# Patient Record
Sex: MALE | Race: WHITE | NOT HISPANIC OR LATINO | Employment: OTHER | URBAN - METROPOLITAN AREA
[De-identification: names, ages, dates, MRNs, and addresses within clinical notes are randomized per-mention and may not be internally consistent; named-entity substitution may affect disease eponyms.]

---

## 2017-03-23 ENCOUNTER — PATIENT OUTREACH (OUTPATIENT)
Dept: HEALTH INFORMATION MANAGEMENT | Facility: OTHER | Age: 82
End: 2017-03-23

## 2017-03-23 NOTE — PROGRESS NOTES
Outcome: Wrong Number -- Phone number [phone] was removed from patient's chart due to it no longer being in service. Flag placed    Attempt # 1ST

## 2017-03-29 ENCOUNTER — HOSPITAL ENCOUNTER (OUTPATIENT)
Dept: LAB | Facility: MEDICAL CENTER | Age: 82
End: 2017-03-29
Attending: INTERNAL MEDICINE
Payer: MEDICARE

## 2017-03-29 LAB
ALBUMIN SERPL BCP-MCNC: 3.9 G/DL (ref 3.2–4.9)
ALBUMIN/GLOB SERPL: 1.6 G/DL
ALP SERPL-CCNC: 69 U/L (ref 30–99)
ALT SERPL-CCNC: 11 U/L (ref 2–50)
ANION GAP SERPL CALC-SCNC: 7 MMOL/L (ref 0–11.9)
AST SERPL-CCNC: 15 U/L (ref 12–45)
BILIRUB SERPL-MCNC: 0.6 MG/DL (ref 0.1–1.5)
BNP SERPL-MCNC: 18 PG/ML (ref 0–100)
BUN SERPL-MCNC: 26 MG/DL (ref 8–22)
CALCIUM SERPL-MCNC: 9.4 MG/DL (ref 8.5–10.5)
CHLORIDE SERPL-SCNC: 100 MMOL/L (ref 96–112)
CO2 SERPL-SCNC: 33 MMOL/L (ref 20–33)
CREAT SERPL-MCNC: 1.38 MG/DL (ref 0.5–1.4)
GLOBULIN SER CALC-MCNC: 2.4 G/DL (ref 1.9–3.5)
GLUCOSE SERPL-MCNC: 179 MG/DL (ref 65–99)
POTASSIUM SERPL-SCNC: 3.7 MMOL/L (ref 3.6–5.5)
PROT SERPL-MCNC: 6.3 G/DL (ref 6–8.2)
SODIUM SERPL-SCNC: 140 MMOL/L (ref 135–145)
TSH SERPL DL<=0.005 MIU/L-ACNC: 3.4 UIU/ML (ref 0.3–3.7)

## 2017-03-29 PROCEDURE — 36415 COLL VENOUS BLD VENIPUNCTURE: CPT

## 2017-03-29 PROCEDURE — 80053 COMPREHEN METABOLIC PANEL: CPT

## 2017-03-29 PROCEDURE — 84443 ASSAY THYROID STIM HORMONE: CPT

## 2017-03-29 PROCEDURE — 83880 ASSAY OF NATRIURETIC PEPTIDE: CPT | Mod: GA

## 2017-04-04 NOTE — PROGRESS NOTES
Attempt #:2    Verify PCP: yes    Communication Preference Obtained: yes     Review Care Team: yes    Annual Wellness Visit Scheduling  1. Scheduling Status:Not Scheduled. Patient states they are under Doctor's care -- PATIENT WILL HAVE DONE WITH PCP.    MoveinBlue Activation: already active  MoveinBlue Reddy: no  Virtual Visits: no  Opt In to Text Messages: no

## 2017-09-21 ENCOUNTER — HOSPITAL ENCOUNTER (OUTPATIENT)
Dept: LAB | Facility: MEDICAL CENTER | Age: 82
End: 2017-09-21
Attending: INTERNAL MEDICINE
Payer: MEDICARE

## 2017-09-21 LAB
APPEARANCE UR: CLEAR
BACTERIA #/AREA URNS HPF: ABNORMAL /HPF
BILIRUB UR QL STRIP.AUTO: NEGATIVE
COLOR UR: YELLOW
CULTURE IF INDICATED INDCX: YES UA CULTURE
EPI CELLS #/AREA URNS HPF: NEGATIVE /HPF
GLUCOSE UR STRIP.AUTO-MCNC: NEGATIVE MG/DL
HYALINE CASTS #/AREA URNS LPF: ABNORMAL /LPF
KETONES UR STRIP.AUTO-MCNC: NEGATIVE MG/DL
LEUKOCYTE ESTERASE UR QL STRIP.AUTO: ABNORMAL
MICRO URNS: ABNORMAL
NITRITE UR QL STRIP.AUTO: NEGATIVE
PH UR STRIP.AUTO: 7 [PH]
PROT UR QL STRIP: NEGATIVE MG/DL
RBC # URNS HPF: ABNORMAL /HPF
RBC UR QL AUTO: NEGATIVE
SP GR UR STRIP.AUTO: 1
UROBILINOGEN UR STRIP.AUTO-MCNC: 0.2 MG/DL
WBC #/AREA URNS HPF: ABNORMAL /HPF

## 2017-09-21 PROCEDURE — 81001 URINALYSIS AUTO W/SCOPE: CPT

## 2017-09-21 PROCEDURE — 87086 URINE CULTURE/COLONY COUNT: CPT

## 2017-09-23 LAB
BACTERIA UR CULT: NORMAL
SIGNIFICANT IND 70042: NORMAL
SOURCE SOURCE: NORMAL

## 2017-10-24 ENCOUNTER — HOSPITAL ENCOUNTER (OUTPATIENT)
Dept: LAB | Facility: MEDICAL CENTER | Age: 82
End: 2017-10-24
Attending: INTERNAL MEDICINE
Payer: MEDICARE

## 2017-10-24 LAB
ALBUMIN SERPL BCP-MCNC: 3.7 G/DL (ref 3.2–4.9)
ALBUMIN/GLOB SERPL: 1.3 G/DL
ALP SERPL-CCNC: 63 U/L (ref 30–99)
ALT SERPL-CCNC: 15 U/L (ref 2–50)
ANION GAP SERPL CALC-SCNC: 3 MMOL/L (ref 0–11.9)
AST SERPL-CCNC: 15 U/L (ref 12–45)
BILIRUB SERPL-MCNC: 0.8 MG/DL (ref 0.1–1.5)
BNP SERPL-MCNC: 44 PG/ML (ref 0–100)
BUN SERPL-MCNC: 25 MG/DL (ref 8–22)
CALCIUM SERPL-MCNC: 9.5 MG/DL (ref 8.5–10.5)
CHLORIDE SERPL-SCNC: 103 MMOL/L (ref 96–112)
CO2 SERPL-SCNC: 33 MMOL/L (ref 20–33)
CREAT SERPL-MCNC: 1.29 MG/DL (ref 0.5–1.4)
GFR SERPL CREATININE-BSD FRML MDRD: 54 ML/MIN/1.73 M 2
GLOBULIN SER CALC-MCNC: 2.8 G/DL (ref 1.9–3.5)
GLUCOSE SERPL-MCNC: 87 MG/DL (ref 65–99)
POTASSIUM SERPL-SCNC: 4.1 MMOL/L (ref 3.6–5.5)
PROT SERPL-MCNC: 6.5 G/DL (ref 6–8.2)
PSA SERPL-MCNC: 12.99 NG/ML (ref 0–4)
SODIUM SERPL-SCNC: 139 MMOL/L (ref 135–145)
TSH SERPL DL<=0.005 MIU/L-ACNC: 1.88 UIU/ML (ref 0.3–3.7)

## 2017-10-24 PROCEDURE — 36415 COLL VENOUS BLD VENIPUNCTURE: CPT

## 2017-10-24 PROCEDURE — 83880 ASSAY OF NATRIURETIC PEPTIDE: CPT | Mod: GA

## 2017-10-24 PROCEDURE — 80053 COMPREHEN METABOLIC PANEL: CPT

## 2017-10-24 PROCEDURE — 84443 ASSAY THYROID STIM HORMONE: CPT

## 2017-10-24 PROCEDURE — 84153 ASSAY OF PSA TOTAL: CPT | Mod: GA

## 2018-03-25 ENCOUNTER — HOSPITAL ENCOUNTER (INPATIENT)
Dept: HOSPITAL 8 - ED | Age: 83
LOS: 3 days | Discharge: HOME | DRG: 682 | End: 2018-03-28
Attending: HOSPITALIST | Admitting: HOSPITALIST
Payer: MEDICARE

## 2018-03-25 VITALS — BODY MASS INDEX: 31.64 KG/M2 | WEIGHT: 171.96 LBS | HEIGHT: 62 IN

## 2018-03-25 VITALS — DIASTOLIC BLOOD PRESSURE: 73 MMHG | SYSTOLIC BLOOD PRESSURE: 119 MMHG

## 2018-03-25 DIAGNOSIS — Z86.011: ICD-10-CM

## 2018-03-25 DIAGNOSIS — E78.5: ICD-10-CM

## 2018-03-25 DIAGNOSIS — J45.909: ICD-10-CM

## 2018-03-25 DIAGNOSIS — Z87.891: ICD-10-CM

## 2018-03-25 DIAGNOSIS — Z79.01: ICD-10-CM

## 2018-03-25 DIAGNOSIS — Z66: ICD-10-CM

## 2018-03-25 DIAGNOSIS — E43: ICD-10-CM

## 2018-03-25 DIAGNOSIS — R55: ICD-10-CM

## 2018-03-25 DIAGNOSIS — N40.0: ICD-10-CM

## 2018-03-25 DIAGNOSIS — R62.7: ICD-10-CM

## 2018-03-25 DIAGNOSIS — E83.39: ICD-10-CM

## 2018-03-25 DIAGNOSIS — E86.0: ICD-10-CM

## 2018-03-25 DIAGNOSIS — N17.0: Primary | ICD-10-CM

## 2018-03-25 DIAGNOSIS — D32.9: ICD-10-CM

## 2018-03-25 DIAGNOSIS — F41.9: ICD-10-CM

## 2018-03-25 DIAGNOSIS — N18.3: ICD-10-CM

## 2018-03-25 DIAGNOSIS — I82.401: ICD-10-CM

## 2018-03-25 DIAGNOSIS — D68.69: ICD-10-CM

## 2018-03-25 DIAGNOSIS — Z86.718: ICD-10-CM

## 2018-03-25 LAB
ALBUMIN SERPL-MCNC: 2.9 G/DL (ref 3.4–5)
ALP SERPL-CCNC: 65 U/L (ref 45–117)
ALT SERPL-CCNC: 21 U/L (ref 12–78)
ANION GAP SERPL CALC-SCNC: 6 MMOL/L (ref 5–15)
BASOPHILS # BLD AUTO: 0 X10^3/UL (ref 0–0.1)
BASOPHILS NFR BLD AUTO: 0 % (ref 0–1)
BILIRUB SERPL-MCNC: 0.7 MG/DL (ref 0.2–1)
CALCIUM SERPL-MCNC: 7.5 MG/DL (ref 8.5–10.1)
CHLORIDE SERPL-SCNC: 110 MMOL/L (ref 98–107)
CHLORIDE,URINE RANDOM: 153 MMOL/L
CLOSTRIDIUM DIFFICILE ANTIGEN: NEGATIVE
CLOSTRIDIUM DIFFICILE TOXIN: NEGATIVE
CREAT SERPL-MCNC: 1.7 MG/DL (ref 0.7–1.3)
EOSINOPHIL # BLD AUTO: 0 X10^3/UL (ref 0–0.4)
EOSINOPHIL NFR BLD AUTO: 0 % (ref 1–7)
ERYTHROCYTE [DISTWIDTH] IN BLOOD BY AUTOMATED COUNT: 14 % (ref 9.4–14.8)
HBV SURFACE AB SER RIA-ACNC: 184 MG/DL
LYMPHOCYTES # BLD AUTO: 0.41 X10^3/UL (ref 1–3.4)
LYMPHOCYTES NFR BLD AUTO: 6 % (ref 22–44)
MCH RBC QN AUTO: 30.4 PG (ref 27.5–34.5)
MCHC RBC AUTO-ENTMCNC: 33.8 G/DL (ref 33.2–36.2)
MCV RBC AUTO: 89.9 FL (ref 81–97)
MD: NO
MONOCYTES # BLD AUTO: 0.34 X10^3/UL (ref 0.2–0.8)
MONOCYTES NFR BLD AUTO: 5 % (ref 2–9)
NEUTROPHILS # BLD AUTO: 6.13 X10^3/UL (ref 1.8–6.8)
NEUTROPHILS NFR BLD AUTO: 89 % (ref 42–75)
PLATELET # BLD AUTO: 138 X10^3/UL (ref 130–400)
PMV BLD AUTO: 8.9 FL (ref 7.4–10.4)
POTASSIUM UR-SCNC: 38 MMOL/L
PROT SERPL-MCNC: 6.3 G/DL (ref 6.4–8.2)
RBC # BLD AUTO: 4.73 X10^6/UL (ref 4.38–5.82)
SODIUM,URINE RANDOM: 112 MMOL/L
TROPONIN I SERPL-MCNC: < 0.015 NG/ML (ref 0–0.04)

## 2018-03-25 PROCEDURE — 87324 CLOSTRIDIUM AG IA: CPT

## 2018-03-25 PROCEDURE — 87046 STOOL CULTR AEROBIC BACT EA: CPT

## 2018-03-25 PROCEDURE — 80053 COMPREHEN METABOLIC PANEL: CPT

## 2018-03-25 PROCEDURE — 36415 COLL VENOUS BLD VENIPUNCTURE: CPT

## 2018-03-25 PROCEDURE — 93970 EXTREMITY STUDY: CPT

## 2018-03-25 PROCEDURE — 82570 ASSAY OF URINE CREATININE: CPT

## 2018-03-25 PROCEDURE — 84133 ASSAY OF URINE POTASSIUM: CPT

## 2018-03-25 PROCEDURE — 84300 ASSAY OF URINE SODIUM: CPT

## 2018-03-25 PROCEDURE — 87328 CRYPTOSPORIDIUM AG IA: CPT

## 2018-03-25 PROCEDURE — 84484 ASSAY OF TROPONIN QUANT: CPT

## 2018-03-25 PROCEDURE — 84100 ASSAY OF PHOSPHORUS: CPT

## 2018-03-25 PROCEDURE — 93306 TTE W/DOPPLER COMPLETE: CPT

## 2018-03-25 PROCEDURE — 93005 ELECTROCARDIOGRAM TRACING: CPT

## 2018-03-25 PROCEDURE — 87427 SHIGA-LIKE TOXIN AG IA: CPT

## 2018-03-25 PROCEDURE — 82436 ASSAY OF URINE CHLORIDE: CPT

## 2018-03-25 PROCEDURE — 83735 ASSAY OF MAGNESIUM: CPT

## 2018-03-25 PROCEDURE — 82040 ASSAY OF SERUM ALBUMIN: CPT

## 2018-03-25 PROCEDURE — 87329 GIARDIA AG IA: CPT

## 2018-03-25 PROCEDURE — 84439 ASSAY OF FREE THYROXINE: CPT

## 2018-03-25 PROCEDURE — 99285 EMERGENCY DEPT VISIT HI MDM: CPT

## 2018-03-25 PROCEDURE — 89055 LEUKOCYTE ASSESSMENT FECAL: CPT

## 2018-03-25 PROCEDURE — 81001 URINALYSIS AUTO W/SCOPE: CPT

## 2018-03-25 PROCEDURE — 80048 BASIC METABOLIC PNL TOTAL CA: CPT

## 2018-03-25 PROCEDURE — 74021 RADEX ABDOMEN 3+ VIEWS: CPT

## 2018-03-25 PROCEDURE — 85025 COMPLETE CBC W/AUTO DIFF WBC: CPT

## 2018-03-25 RX ADMIN — DEXTROSE AND SODIUM CHLORIDE SCH MLS/HR: 5; .45 INJECTION, SOLUTION INTRAVENOUS at 22:45

## 2018-03-25 RX ADMIN — SIMVASTATIN SCH MG: 10 TABLET, FILM COATED ORAL at 22:40

## 2018-03-26 VITALS — DIASTOLIC BLOOD PRESSURE: 59 MMHG | SYSTOLIC BLOOD PRESSURE: 101 MMHG

## 2018-03-26 VITALS — DIASTOLIC BLOOD PRESSURE: 71 MMHG | SYSTOLIC BLOOD PRESSURE: 118 MMHG

## 2018-03-26 VITALS — DIASTOLIC BLOOD PRESSURE: 73 MMHG | SYSTOLIC BLOOD PRESSURE: 124 MMHG

## 2018-03-26 VITALS — DIASTOLIC BLOOD PRESSURE: 63 MMHG | SYSTOLIC BLOOD PRESSURE: 108 MMHG

## 2018-03-26 LAB
ANION GAP SERPL CALC-SCNC: 6 MMOL/L (ref 5–15)
BASOPHILS # BLD AUTO: 0.01 X10^3/UL (ref 0–0.1)
BASOPHILS NFR BLD AUTO: 0 % (ref 0–1)
CALCIUM SERPL-MCNC: 7.4 MG/DL (ref 8.5–10.1)
CHLORIDE SERPL-SCNC: 110 MMOL/L (ref 98–107)
CREAT SERPL-MCNC: 1.4 MG/DL (ref 0.7–1.3)
EOSINOPHIL # BLD AUTO: 0.02 X10^3/UL (ref 0–0.4)
EOSINOPHIL NFR BLD AUTO: 1 % (ref 1–7)
ERYTHROCYTE [DISTWIDTH] IN BLOOD BY AUTOMATED COUNT: 14.2 % (ref 9.4–14.8)
LYMPHOCYTES # BLD AUTO: 0.68 X10^3/UL (ref 1–3.4)
LYMPHOCYTES NFR BLD AUTO: 13 % (ref 22–44)
MCH RBC QN AUTO: 31.2 PG (ref 27.5–34.5)
MCHC RBC AUTO-ENTMCNC: 34.8 G/DL (ref 33.2–36.2)
MCV RBC AUTO: 89.8 FL (ref 81–97)
MD: NO
MONOCYTES # BLD AUTO: 0.28 X10^3/UL (ref 0.2–0.8)
MONOCYTES NFR BLD AUTO: 5 % (ref 2–9)
NEUTROPHILS # BLD AUTO: 4.27 X10^3/UL (ref 1.8–6.8)
NEUTROPHILS NFR BLD AUTO: 81 % (ref 42–75)
PLATELET # BLD AUTO: 130 X10^3/UL (ref 130–400)
PMV BLD AUTO: 9.2 FL (ref 7.4–10.4)
RBC # BLD AUTO: 4.22 X10^6/UL (ref 4.38–5.82)

## 2018-03-26 RX ADMIN — RIVAROXABAN SCH MG: 10 TABLET, FILM COATED ORAL at 09:05

## 2018-03-26 RX ADMIN — DEXTROSE AND SODIUM CHLORIDE SCH MLS/HR: 5; .45 INJECTION, SOLUTION INTRAVENOUS at 14:40

## 2018-03-26 RX ADMIN — TAMSULOSIN HYDROCHLORIDE SCH MG: 0.4 CAPSULE ORAL at 09:05

## 2018-03-26 RX ADMIN — SIMVASTATIN SCH MG: 10 TABLET, FILM COATED ORAL at 20:34

## 2018-03-26 RX ADMIN — DEXTROSE AND SODIUM CHLORIDE SCH MLS/HR: 5; .45 INJECTION, SOLUTION INTRAVENOUS at 06:36

## 2018-03-26 RX ADMIN — DEXTROSE AND SODIUM CHLORIDE SCH MLS/HR: 5; .45 INJECTION, SOLUTION INTRAVENOUS at 23:14

## 2018-03-27 VITALS — SYSTOLIC BLOOD PRESSURE: 110 MMHG | DIASTOLIC BLOOD PRESSURE: 62 MMHG

## 2018-03-27 VITALS — DIASTOLIC BLOOD PRESSURE: 66 MMHG | SYSTOLIC BLOOD PRESSURE: 129 MMHG

## 2018-03-27 VITALS — SYSTOLIC BLOOD PRESSURE: 102 MMHG | DIASTOLIC BLOOD PRESSURE: 56 MMHG

## 2018-03-27 VITALS — SYSTOLIC BLOOD PRESSURE: 116 MMHG | DIASTOLIC BLOOD PRESSURE: 73 MMHG

## 2018-03-27 LAB
ALBUMIN SERPL-MCNC: 2.4 G/DL (ref 3.4–5)
ANION GAP SERPL CALC-SCNC: 3 MMOL/L (ref 5–15)
BASOPHILS # BLD AUTO: 0.01 X10^3/UL (ref 0–0.1)
BASOPHILS NFR BLD AUTO: 0 % (ref 0–1)
CALCIUM SERPL-MCNC: 7.6 MG/DL (ref 8.5–10.1)
CHLORIDE SERPL-SCNC: 112 MMOL/L (ref 98–107)
CREAT SERPL-MCNC: 1.14 MG/DL (ref 0.7–1.3)
CULTURE INDICATED?: NO
EOSINOPHIL # BLD AUTO: 0.19 X10^3/UL (ref 0–0.4)
EOSINOPHIL NFR BLD AUTO: 4 % (ref 1–7)
ERYTHROCYTE [DISTWIDTH] IN BLOOD BY AUTOMATED COUNT: 14.1 % (ref 9.4–14.8)
LYMPHOCYTES # BLD AUTO: 0.77 X10^3/UL (ref 1–3.4)
LYMPHOCYTES NFR BLD AUTO: 16 % (ref 22–44)
MCH RBC QN AUTO: 30.5 PG (ref 27.5–34.5)
MCHC RBC AUTO-ENTMCNC: 33.7 G/DL (ref 33.2–36.2)
MCV RBC AUTO: 90.5 FL (ref 81–97)
MD: (no result)
MICROSCOPIC: (no result)
MONOCYTES # BLD AUTO: 0.49 X10^3/UL (ref 0.2–0.8)
MONOCYTES NFR BLD AUTO: 10 % (ref 2–9)
NEUTROPHILS # BLD AUTO: 3.39 X10^3/UL (ref 1.8–6.8)
NEUTROPHILS NFR BLD AUTO: 70 % (ref 42–75)
PLATELET # BLD AUTO: 102 X10^3/UL (ref 130–400)
PMV BLD AUTO: 9.6 FL (ref 7.4–10.4)
RBC # BLD AUTO: 4.2 X10^6/UL (ref 4.38–5.82)

## 2018-03-27 RX ADMIN — TAMSULOSIN HYDROCHLORIDE SCH MG: 0.4 CAPSULE ORAL at 09:25

## 2018-03-27 RX ADMIN — RIVAROXABAN SCH MG: 10 TABLET, FILM COATED ORAL at 09:25

## 2018-03-27 RX ADMIN — SIMVASTATIN SCH MG: 10 TABLET, FILM COATED ORAL at 20:31

## 2018-03-27 RX ADMIN — DEXTROSE AND SODIUM CHLORIDE SCH MLS/HR: 5; .45 INJECTION, SOLUTION INTRAVENOUS at 08:00

## 2018-03-28 VITALS — SYSTOLIC BLOOD PRESSURE: 97 MMHG | DIASTOLIC BLOOD PRESSURE: 56 MMHG

## 2018-03-28 VITALS — SYSTOLIC BLOOD PRESSURE: 119 MMHG | DIASTOLIC BLOOD PRESSURE: 68 MMHG

## 2018-03-28 LAB
ALBUMIN SERPL-MCNC: 2.4 G/DL (ref 3.4–5)
ANION GAP SERPL CALC-SCNC: 5 MMOL/L (ref 5–15)
CALCIUM SERPL-MCNC: 7.7 MG/DL (ref 8.5–10.1)
CHLORIDE SERPL-SCNC: 111 MMOL/L (ref 98–107)
CREAT SERPL-MCNC: 1.05 MG/DL (ref 0.7–1.3)

## 2018-03-28 RX ADMIN — RIVAROXABAN SCH MG: 10 TABLET, FILM COATED ORAL at 10:35

## 2018-03-28 RX ADMIN — TAMSULOSIN HYDROCHLORIDE SCH MG: 0.4 CAPSULE ORAL at 10:35

## 2019-01-17 ENCOUNTER — HOSPITAL ENCOUNTER (EMERGENCY)
Dept: HOSPITAL 8 - ED | Age: 84
LOS: 1 days | Discharge: HOME | End: 2019-01-18
Payer: COMMERCIAL

## 2019-01-17 VITALS — HEIGHT: 66 IN | BODY MASS INDEX: 26.93 KG/M2 | WEIGHT: 167.55 LBS

## 2019-01-17 DIAGNOSIS — Z86.718: ICD-10-CM
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
## 2019-01-17 DIAGNOSIS — S09.8XXA: ICD-10-CM
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
## 2019-01-17 DIAGNOSIS — S01.01XA: Primary | ICD-10-CM

## 2019-01-17 DIAGNOSIS — N18.3: ICD-10-CM

## 2019-01-17 DIAGNOSIS — J45.909: ICD-10-CM
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
## 2019-01-17 PROCEDURE — 12002 RPR S/N/AX/GEN/TRNK2.6-7.5CM: CPT

## 2019-01-17 PROCEDURE — 99284 EMERGENCY DEPT VISIT MOD MDM: CPT

## 2019-01-17 PROCEDURE — 72125 CT NECK SPINE W/O DYE: CPT

## 2019-01-17 PROCEDURE — 70450 CT HEAD/BRAIN W/O DYE: CPT

## 2019-01-18 VITALS — DIASTOLIC BLOOD PRESSURE: 74 MMHG | SYSTOLIC BLOOD PRESSURE: 121 MMHG

## 2019-02-12 ENCOUNTER — HOSPITAL ENCOUNTER (EMERGENCY)
Facility: MEDICAL CENTER | Age: 84
End: 2019-02-13
Attending: EMERGENCY MEDICINE
Payer: MEDICARE
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
## 2019-02-12 DIAGNOSIS — S09.90XA TRAUMATIC INJURY OF HEAD, INITIAL ENCOUNTER: ICD-10-CM

## 2019-02-12 PROCEDURE — 99285 EMERGENCY DEPT VISIT HI MDM: CPT

## 2019-02-12 ASSESSMENT — LIFESTYLE VARIABLES: DO YOU DRINK ALCOHOL: NO

## 2019-02-13 ENCOUNTER — APPOINTMENT (OUTPATIENT)
Dept: RADIOLOGY | Facility: MEDICAL CENTER | Age: 84
End: 2019-02-13
Attending: EMERGENCY MEDICINE
Payer: MEDICARE

## 2019-02-13 VITALS
WEIGHT: 149.91 LBS | SYSTOLIC BLOOD PRESSURE: 104 MMHG | HEIGHT: 62 IN | RESPIRATION RATE: 27 BRPM | BODY MASS INDEX: 27.59 KG/M2 | OXYGEN SATURATION: 93 % | DIASTOLIC BLOOD PRESSURE: 64 MMHG | TEMPERATURE: 98.4 F | HEART RATE: 64 BPM

## 2019-02-13 PROCEDURE — 70450 CT HEAD/BRAIN W/O DYE: CPT

## 2019-02-13 NOTE — ED NOTES
Patient discharged with instructions for non traumatic fall with prescriptions x0 signs and symptoms explained to patient for reasons to return to emergency department, Patient is understanding and has no further questions. Pt in wheelchair to [email or web address] tech to help pt to cab. Pt provided cab voucher from social work.

## 2019-02-13 NOTE — DISCHARGE INSTRUCTIONS
CT was negative for any bleed or acute trauma.  Abrasions should have bacitracin or Neosporin applied daily for the next week.

## 2019-02-13 NOTE — ED PROVIDER NOTES
ED Provider Note    CHIEF COMPLAINT  Chief Complaint   Patient presents with   • Fall       HPI  Chino Ennis is a 83 y.o. male who presents after mechanical ground level fall.  Patient was getting up from his chair when he slipped and fell onto his face.  She denies any loss of consciousness.  Patient denies any associated weakness or numbness or feeling like he was going to pass out now or immediately after the event.  Patient denies any associated chest pain shortness of breath or palpitations.  Patient was able to ambulate back into the chair, changed and then go to bed.  Nursing staff saw blood on patient's face and therefore called EMS.  Patient is on Eliquis for blood clotting disorder.    REVIEW OF SYSTEMS  ROS  See HPI for further details. All other systems are negative.     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    SOCIAL HISTORY  Social History     Social History Main Topics   • Smoking status: Former Smoker     Packs/day: 1.50     Years: 30.00     Types: Cigarettes     Quit date: 1/1/1998   • Smokeless tobacco: Never Used      Comment: avoid all tobacco products   • Alcohol use 1.0 oz/week     2 Shots of liquor per week      Comment: 2 per week   • Drug use: No   • Sexual activity: No       SURGICAL HISTORY   has a past surgical history that includes trans urethral resection prostate; tonsillectomy; dental extraction(s); arthroscopy, knee; craniotomy  brain lab (2010); vasectomy; prostatectomy, radical retro; eye surgery; craniotomy; cystoscopy (N/A, 3/7/2016); lasertripsy (N/A, 3/7/2016); and trans urethral resection prostate (N/A, 3/7/2016).    CURRENT MEDICATIONS  Home Medications    **Home medications have not yet been reviewed for this encounter**         ALLERGIES  Allergies   Allergen Reactions   • Other Environmental Unspecified     Grasses and weeds=asthma        PHYSICAL EXAM  Physical Exam   Constitutional: He is oriented to person, place, and time. He appears well-developed and well-nourished.   HENT:   Head: Normocephalic.   Abrasion overlying patient's nasal bridge, abrasion overlying patient's superior orbital rim without any underlying bony abnormality or tenderness.  No malocclusion of the jaw, no trismus no hemotympanum bilaterally   Eyes: Pupils are equal, round, and reactive to light. Conjunctivae are normal.   Neck: Normal range of motion. Neck supple.   Cardiovascular: Normal rate and regular rhythm.  Exam reveals no gallop and no friction rub.    No murmur heard.  Pulmonary/Chest: Effort normal and breath sounds normal. No respiratory distress. He has no wheezes.   Abdominal: Soft. Bowel sounds are normal. He exhibits no distension. There is no tenderness. There is no rebound.   Musculoskeletal:   C/T/L without any midline TTP or bony abn/stepoffs     No bony abn of clavicles, shoulders, elbows wrists and hands without any TTP or major ROM limitation; compartments soft    No chest TTP on compression    Abd without any TTP or ecchymosis    Pelvis is stable on compression    BL hips, knees ankle without TTP or major limitations of ROM; compartments soft    All distal pulses are intact     no focal motor or sensory deficit        Neurological: He is alert and oriented to person, place, and time.   Distal and proximal strength 5/5 in upper and lower extremities. Normal gait. No dysmetria. No sensation deficits. No visual field deficits. Cranial  nerves intact.      Skin: Skin is warm and dry.   Psychiatric: He has a normal mood and affect. His behavior is normal.         COURSE & MEDICAL DECISION MAKING  Pertinent Labs & Imaging studies reviewed. (See chart for details)  Patient here after mechanical fall, he is on Eliquis and struck his head.  Will check CT, patient's neurologic exam and trauma exam is otherwise unremarkable.  Chest abdomen pelvis cervical spine thoracic spine lumbar spine has been cleared clinically.  Patient without any evidence of basilar skull fracture.  Will CT scan patient's head, patient remains very well-appearing, this injury occurred around 3 hours prior to arrival I believe a single CT is sufficient.  Patient CT scan was unremarkable.  Patient's exam is remained unremarkable.   The patient will return for worsening symptoms and is stable at the time of discharge. The patient verbalizes understanding and will comply.    FINAL IMPRESSION  1.   1. Fall, initial encounter    2. Traumatic injury of head, initial encounter               Electronically signed by: Mando Vick, 2/13/2019 12:31 AM

## 2019-02-13 NOTE — ED TRIAGE NOTES
"BIB EMS for mechanical ground level fall. EMS was called by the pt's residence ([facility]) for a fall. Pt stating he was watching TV at his home and went to get up and fell on his side. Pt denies any LOC of complaint of any sort. Pt stating, \"just send my home.\" Pt does have an abrasion over his left eye but states that it is old. Placed on cardiac monitor, bp cuff and pulse ox.    "

## 2019-02-13 NOTE — ED NOTES
"Pt reports that he feels like he speech is \"different.\" Unable to detect and slurred speech at this time. Neuro exam performed and no obvious stroke like symptoms present. Also spoke with the pt's son who called and stated he had a fall a month ago and seen at [facility] then sent to rehab for physical therapy.   "

## 2019-02-13 NOTE — ED NOTES
Telephone provided to pt to contact his son. Pt's son called and requesting to talk to pt. Pt declined to call son back.  Contact information for Son: Paolo [phone].

## 2019-02-28 NOTE — DISCHARGE PLANNING
Medical Social Work    Referral: Transportation Assistance    Intervention: MSW received a call from bedside RN that pt is in need of a ride home: [facility] [address], [place].  Pt has no family local to transport him and has no money to pay for a cab.  MSW reviewed pt's chart and pt has not received assistance in the past.  MSW provided bedside RN with cab voucher (# [phone]) for pt to get home safely.    Plan: Pt to D/C home via cab.   Patient Education      It is okay to resume breast-feeding immediately even after the IV contrast dye,   alternatively, if you want to \"calm and dump\" for up to 12, or even 24 hours, that would be acceptable to, but not necessary. Chest Pain: Care Instructions  Your Care Instructions    There are many things that can cause chest pain. Some are not serious and will get better on their own in a few days. But some kinds of chest pain need more testing and treatment. Your doctor may have recommended a follow-up visit in the next 8 to 12 hours. If you are not getting better, you may need more tests or treatment. Even though your doctor has released you, you still need to watch for any problems. The doctor carefully checked you, but sometimes problems can develop later. If you have new symptoms or if your symptoms do not get better, get medical care right away. If you have worse or different chest pain or pressure that lasts more than 5 minutes or you passed out (lost consciousness), call 911 or seek other emergency help right away. A medical visit is only one step in your treatment. Even if you feel better, you still need to do what your doctor recommends, such as going to all suggested follow-up appointments and taking medicines exactly as directed. This will help you recover and help prevent future problems. How can you care for yourself at home? · Rest until you feel better. · Take your medicine exactly as prescribed. Call your doctor if you think you are having a problem with your medicine. · Do not drive after taking a prescription pain medicine. When should you call for help? Call 911 if:    · You passed out (lost consciousness).     · You have severe difficulty breathing.     · You have symptoms of a heart attack. These may include:  ? Chest pain or pressure, or a strange feeling in your chest.  ? Sweating. ? Shortness of breath. ? Nausea or vomiting.   ? Pain, pressure, or a strange feeling in your back, neck, jaw, or upper belly or in one or both shoulders or arms. ? Lightheadedness or sudden weakness. ? A fast or irregular heartbeat. After you call 911, the  may tell you to chew 1 adult-strength or 2 to 4 low-dose aspirin. Wait for an ambulance. Do not try to drive yourself.    Call your doctor today if:    · You have any trouble breathing.     · Your chest pain gets worse.     · You are dizzy or lightheaded, or you feel like you may faint.     · You are not getting better as expected.     · You are having new or different chest pain. Where can you learn more? Go to http://shaGanjiwangjerardo.info/. Enter A120 in the search box to learn more about \"Chest Pain: Care Instructions. \"  Current as of: September 23, 2018  Content Version: 11.9  © 8705-5594 [employer]. Care instructions adapted under license by [employer] (which disclaims liability or warranty for this information). If you have questions about a medical condition or this instruction, always ask your healthcare professional. [place] [address] any warranty or liability for your use of this information. Patient Education        Shortness of Breath: Care Instructions  Your Care Instructions  Shortness of breath has many causes. Sometimes conditions such as anxiety can lead to shortness of breath. Some people get mild shortness of breath when they exercise. Trouble breathing also can be a symptom of a serious problem, such as asthma, lung disease, emphysema, heart problems, and pneumonia. If your shortness of breath continues, you may need tests and treatment. Watch for any changes in your breathing and other symptoms. Follow-up care is a key part of your treatment and safety. Be sure to make and go to all appointments, and call your doctor if you are having problems. It's also a good idea to know your test results and keep a list of the medicines you take.   How can you care for yourself at home? · Do not smoke or allow others to smoke around you. If you need help quitting, talk to your doctor about stop-smoking programs and medicines. These can increase your chances of quitting for good. · Get plenty of rest and sleep. · Take your medicines exactly as prescribed. Call your doctor if you think you are having a problem with your medicine. · Find healthy ways to deal with stress. ? Exercise daily. ? Get plenty of sleep. ? Eat regularly and well. When should you call for help? Call 911 anytime you think you may need emergency care. For example, call if:    · You have severe shortness of breath.     · You have symptoms of a heart attack. These may include:  ? Chest pain or pressure, or a strange feeling in the chest.  ? Sweating. ? Shortness of breath. ? Nausea or vomiting. ? Pain, pressure, or a strange feeling in the back, neck, jaw, or upper belly or in one or both shoulders or arms. ? Lightheadedness or sudden weakness. ? A fast or irregular heartbeat. After you call 911, the  may tell you to chew 1 adult-strength or 2 to 4 low-dose aspirin. Wait for an ambulance. Do not try to drive yourself.    Call your doctor now or seek immediate medical care if:    · Your shortness of breath gets worse or you start to wheeze. Wheezing is a high-pitched sound when you breathe.     · You wake up at night out of breath or have to prop your head up on several pillows to breathe.     · You are short of breath after only light activity or while at rest.    Watch closely for changes in your health, and be sure to contact your doctor if:    · You do not get better over the next 1 to 2 days. Where can you learn more? Go to http://sha-jerardo.info/. Enter S780 in the search box to learn more about \"Shortness of Breath: Care Instructions. \"  Current as of: September 5, 2018  Content Version: 11.9  © 4305-9294 [employer].  Care instructions adapted under license by [employer] (which disclaims liability or warranty for this information). If you have questions about a medical condition or this instruction, always ask your healthcare professional. [address] any warranty or liability for your use of this information.

## 2019-07-23 ENCOUNTER — HOSPITAL ENCOUNTER (OUTPATIENT)
Dept: LAB | Facility: MEDICAL CENTER | Age: 84
End: 2019-07-23
Attending: INTERNAL MEDICINE
Payer: MEDICARE

## 2019-09-08 ENCOUNTER — HOSPITAL ENCOUNTER (EMERGENCY)
Facility: MEDICAL CENTER | Age: 84
End: 2019-09-08
Attending: EMERGENCY MEDICINE
Payer: MEDICARE

## 2019-09-08 VITALS
RESPIRATION RATE: 18 BRPM | TEMPERATURE: 97.9 F | WEIGHT: 155.2 LBS | HEART RATE: 56 BPM | BODY MASS INDEX: 28.56 KG/M2 | OXYGEN SATURATION: 98 % | DIASTOLIC BLOOD PRESSURE: 55 MMHG | SYSTOLIC BLOOD PRESSURE: 125 MMHG | HEIGHT: 62 IN

## 2019-09-08 DIAGNOSIS — B37.9 CANDIDA INFECTION: ICD-10-CM

## 2019-09-08 PROCEDURE — 99284 EMERGENCY DEPT VISIT MOD MDM: CPT

## 2019-09-08 PROCEDURE — 700102 HCHG RX REV CODE 250 W/ 637 OVERRIDE(OP): Performed by: EMERGENCY MEDICINE

## 2019-09-08 PROCEDURE — A9270 NON-COVERED ITEM OR SERVICE: HCPCS | Performed by: EMERGENCY MEDICINE

## 2019-09-08 RX ORDER — CLOTRIMAZOLE 1 %
CREAM (GRAM) TOPICAL 2 TIMES DAILY
Status: DISCONTINUED | OUTPATIENT
Start: 2019-09-08 | End: 2019-09-08 | Stop reason: HOSPADM

## 2019-09-08 RX ORDER — BENZOCAINE/MENTHOL 6 MG-10 MG
LOZENGE MUCOUS MEMBRANE 2 TIMES DAILY
Status: DISCONTINUED | OUTPATIENT
Start: 2019-09-08 | End: 2019-09-08 | Stop reason: HOSPADM

## 2019-09-08 RX ADMIN — CLOTRIMAZOLE: 10 CREAM TOPICAL at 09:15

## 2019-09-08 RX ADMIN — HYDROCORTISONE: 10 CREAM TOPICAL at 09:15

## 2019-09-08 NOTE — DISCHARGE INSTRUCTIONS
Buy cortisone cream and antifungal cream at the store and apply behind ear as directed      Skin Yeast Infection  Skin yeast infection is a condition in which there is an overgrowth of yeast (candida) that normally lives on the skin. This condition usually occurs in areas of the skin that are constantly warm and moist, such as the armpits or the groin.  What are the causes?  This condition is caused by a change in the normal balance of the yeast and bacteria that live on the skin.  What increases the risk?  This condition is more likely to develop in:  · People who are obese.  · Pregnant women.  · Women who take birth control pills.  · People who have diabetes.  · People who take antibiotic medicines.  · People who take steroid medicines.  · People who are malnourished.  · People who have a weak defense (immune) system.  · People who are 65 years of age or older.  What are the signs or symptoms?  Symptoms of this condition include:  · A red, swollen area of the skin.  · Bumps on the skin.  · Itchiness.  How is this diagnosed?  This condition is diagnosed with a medical history and physical exam. Your health care provider may check for yeast by taking light scrapings of the skin to be viewed under a microscope.  How is this treated?  This condition is treated with medicine. Medicines may be prescribed or be available over-the-counter. The medicines may be:  · Taken by mouth (orally).  · Applied as a cream.  Follow these instructions at home:  · Take or apply over-the-counter and prescription medicines only as told by your health care provider.  · Eat more yogurt. This may help to keep your yeast infection from returning.  · Maintain a healthy weight. If you need help losing weight, talk with your health care provider.  · Keep your skin clean and dry.  · If you have diabetes, keep your blood sugar under control.  Contact a health care provider if:  · Your symptoms go away and then return.  · Your symptoms do not get  better with treatment.  · Your symptoms get worse.  · Your rash spreads.  · You have a fever or chills.  · You have new symptoms.  · You have new warmth or redness of your skin.  This information is not intended to replace advice given to you by your health care provider. Make sure you discuss any questions you have with your health care provider.  Document Released: 09/04/2012 Document Revised: 08/13/2017 Document Reviewed: 06/20/2016  [employer]" Interactive Patient Education © 2017 Elsevier Inc.

## 2019-09-08 NOTE — ED TRIAGE NOTES
BIB EMS from [facility] with   Chief Complaint   Patient presents with   • Open Wound   Small hole in the posterior ear lobe. Denies fever or chills. States he experiences pain when he sleeps on his right side or when he presses on ear. No redness, drainage, or edema noted.

## 2019-09-08 NOTE — ED NOTES
Instructed how to care for lesion on posterior right ear.     Cleared for DC per ER MD. DC instructions provided and patient verbalizes understanding. NAD noted. VSS. Patient amb with steady gait.

## 2019-09-08 NOTE — DISCHARGE PLANNING
Medical Social Work    MSW received call from bedside RN. Pt stated he does not have any money or family to give him a ride home. Pt's assisted Living facility [facility] does not provided transportation. Due to pt's age, MSW provided pt with cab voucher #[phone] back to facility. MSW updated bedside RN.

## 2019-09-08 NOTE — ED PROVIDER NOTES
"ED Provider Note    Scribed for Aron Bowen M.D. by Rachelle Dia. 9/8/2019, 8:31 AM.    Primary care provider: Lopez Manning M.D.  Means of arrival: Ambulance  History obtained from: Patient  History limited by: None    CHIEF COMPLAINT  Chief Complaint   Patient presents with   • Open Wound     HPI  Chino Ennis is a 84 y.o. male who presents to the Emergency Department brought in by ambulance for an open wound located behind the right ear onset 1 month ago. Per patient, \"I have a hole behind my ear.\"  He reports associated ear pain with palpation. Denies fever. Patient lives in [place] at the [email or web address] He does not have a PCP but states that he gets care at his living facility by the doctor who comes every month.    REVIEW OF SYSTEMS  Pertinent positives include open wound, ear pain. Pertinent negatives include no fever.    See HPI for further details.     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    SURGICAL HISTORY   has a past surgical history that includes trans urethral resection prostate; tonsillectomy; dental extraction(s); arthroscopy, knee; craniotomy brain lab (2010); vasectomy; prostatectomy, radical retro; eye surgery; craniotomy; cystoscopy (N/A, 3/7/2016); lasertripsy (N/A, 3/7/2016); and trans urethral resection prostate (N/A, 3/7/2016).    SOCIAL HISTORY  Social History     Tobacco Use   • Smoking status: Former " Smoker     Packs/day: 1.50     Years: 30.00     Pack years: 45.00     Types: Cigarettes     Last attempt to quit: 1998     Years since quittin.6   • Smokeless tobacco: Never Used   • Tobacco comment: avoid all tobacco products   Substance Use Topics   • Alcohol use: Yes     Alcohol/week: 1.0 oz     Types: 2 Shots of liquor per week     Comment: 2 per week   • Drug use: No      Social History     Substance and Sexual Activity   Drug Use No       FAMILY HISTORY  Family History   Problem Relation Age of Onset   • Cancer Father         Lung   • Heart Disease Father    • Hypertension Father    • Hyperlipidemia Father    • Heart Disease Maternal Uncle    • Hypertension Maternal Uncle    • Hyperlipidemia Maternal Uncle    • Stroke Maternal Uncle    • Cancer Paternal Uncle         Leukemia       CURRENT MEDICATIONS  Home Medications     Reviewed by Cecile Hartman R.N. (Registered Nurse) on 19 at 0804  Med List Status: <None>   Medication Last Dose Status   acyclovir (ZOVIRAX) 800 MG Tab  Active   albuterol (VENTOLIN OR PROVENTIL) 108 (90 BASE) MCG/ACT Aero Soln inhalation aerosol  Active   Apixaban (ELIQUIS PO)  Active   benzonatate (TESSALON) 100 MG Cap  Active   clonazepam (KLONOPIN) 1 MG Tab  Active   doxycycline (PERIOSTAT) 20 MG tablet  Active   doxycycline (PERIOSTAT) 20 MG tablet  Active   famotidine (PEPCID) 20 MG Tab  Active   fluticasone (FLONASE) 50 MCG/ACT nasal spray  Active   Mometasone Furo-Formoterol Fum (DULERA) 100-5 MCG/ACT Aerosol  Active   omeprazole (PRILOSEC OTC) 20 MG tablet  Active   Psyllium (METAMUCIL PO)  Active   rivaroxaban (XARELTO) 15 MG Tab tablet  Active   simvastatin (ZOCOR) 10 MG Tab  Active   tamsulosin (FLOMAX) 0.4 MG capsule  Active   tramadol (ULTRAM) 50 MG Tab  Active   vitamin D (CHOLECALCIFEROL) 1000 UNIT Tab  Active                ALLERGIES  Allergies   Allergen Reactions   • Other Environmental Unspecified     Grasses and weeds=asthma        PHYSICAL  "EXAM  VITAL SIGNS: /55   Pulse (!) 56   Temp 36.6 °C (97.9 °F) (Temporal)   Resp 18   Ht 1.575 m (5' 2\")   Wt 70.4 kg (155 lb 3.3 oz)   SpO2 98%   BMI 28.39 kg/m²   Vitals reviewed.    Constitutional: Alert in no apparent distress.  HENT: Behind the pinna, there is a small fissure without erythema, purulent drainage, or evidence of abscess. No signs of trauma, Bilateral external ears normal, Nose normal.   Eyes: Pupils are equal and reactive, Conjunctiva normal, Non-icteric.   Neck: Normal range of motion, No tenderness, Supple, No stridor.   Lymphatic: No lymphadenopathy noted.   Skin: Warm, Dry, No erythema, No rash.   Extremities: Intact distal pulses, No edema, No tenderness, No cyanosis  Musculoskeletal: Good range of motion in all major joints. No tenderness to palpation or major deformities noted.   Neurologic: Alert , Normal motor function, Normal sensory function, No focal deficits noted.   Psychiatric: Affect normal, Judgment normal, Mood normal.       COURSE & MEDICAL DECISION MAKING  Nursing notes, VS, PMSFHx reviewed in chart.    8:31 AM - Patient seen and examined at bedside. Reassured the patient that there is no drainable abscess evident. Discussed with patient that he likely has a candida infection behind his pinna. We discussed treatment which includes a combination of cortisone and anti-fungal cream. Strict ED return precautions discussed. He is advised to return if he experiences signs or symptoms of infection, develops a fever, or worsening redness. He is encouraged to follow-up with a PCP or with the doctor he sees at his living care facility. He verbalized his understanding and agrees with the discharge instructions.     The patient will return for new or worsening symptoms and is stable at the time of discharge.    The patient is referred to a primary physician for blood pressure management, diabetic screening, and for all other preventative health " concerns.      DISPOSITION:  Patient will be discharged home in stable condition.    FOLLOW UP:  [facility], Emergency Dept  [address] [facility] [address] [phone]    If symptoms worsen    Lopez Manning M.D.  [address] [facility] [id number] [phone]      As needed      OUTPATIENT MEDICATIONS:  Discharge Medication List as of 9/8/2019  8:41 AM          FINAL IMPRESSION  1. Candida infection          Rachelle MONTANO (Maci), am scribing for, and in the presence of, Aron Bowen M.D..    Electronically signed by: Rachelle Dia (Maci), 9/8/2019    Aron MONTANO M.D. personally performed the services described in this documentation, as scribed by Rachelle Dia in my presence, and it is both accurate and complete. E    The note accurately reflects work and decisions made by me.  Aron Bowen  9/8/2019  10:46 AM

## 2019-11-18 ENCOUNTER — OFFICE VISIT (OUTPATIENT)
Dept: URGENT CARE | Facility: CLINIC | Age: 84
End: 2019-11-18
Payer: MEDICARE

## 2019-11-18 VITALS
RESPIRATION RATE: 18 BRPM | OXYGEN SATURATION: 96 % | BODY MASS INDEX: 29.45 KG/M2 | DIASTOLIC BLOOD PRESSURE: 56 MMHG | TEMPERATURE: 98.3 F | HEIGHT: 61 IN | HEART RATE: 62 BPM | WEIGHT: 156 LBS | SYSTOLIC BLOOD PRESSURE: 102 MMHG

## 2019-11-18 DIAGNOSIS — L30.9 DERMATITIS: ICD-10-CM

## 2019-11-18 DIAGNOSIS — L03.114 CELLULITIS OF LEFT HAND: ICD-10-CM

## 2019-11-18 PROCEDURE — 99204 OFFICE O/P NEW MOD 45 MIN: CPT | Performed by: NURSE PRACTITIONER

## 2019-11-18 RX ORDER — SIMVASTATIN 10 MG
TABLET ORAL
COMMUNITY
End: 2019-11-18

## 2019-11-18 RX ORDER — DIAZEPAM 5 MG/1
TABLET ORAL
Refills: 5 | COMMUNITY
Start: 2019-11-15 | End: 2019-11-18

## 2019-11-18 RX ORDER — DIAZEPAM 5 MG/1
5 TABLET ORAL
COMMUNITY
End: 2021-05-28

## 2019-11-18 RX ORDER — ASPIRIN 81 MG/1
TABLET, COATED ORAL
Refills: 11 | COMMUNITY
Start: 2019-10-21 | End: 2019-12-19

## 2019-11-18 RX ORDER — FUROSEMIDE 40 MG/1
TABLET ORAL
Refills: 11 | COMMUNITY
Start: 2019-10-21 | End: 2019-12-19

## 2019-11-18 RX ORDER — TRIAMCINOLONE ACETONIDE 1 MG/G
1 OINTMENT TOPICAL 2 TIMES DAILY
Qty: 1 TUBE | Refills: 0 | Status: ON HOLD
Start: 2019-11-18 | End: 2019-12-23

## 2019-11-18 RX ORDER — CHLORHEXIDINE GLUCONATE ORAL RINSE 1.2 MG/ML
15 SOLUTION DENTAL DAILY
COMMUNITY
End: 2021-09-01 | Stop reason: SDUPTHER

## 2019-11-18 RX ORDER — ASPIRIN 81 MG/1
TABLET ORAL
COMMUNITY
End: 2019-12-19

## 2019-11-18 RX ORDER — CEPHALEXIN 250 MG/1
250 CAPSULE ORAL 3 TIMES DAILY
Qty: 21 CAP | Refills: 0 | Status: SHIPPED | OUTPATIENT
Start: 2019-11-18 | End: 2019-11-25

## 2019-11-18 RX ORDER — TRIAMCINOLONE ACETONIDE 1 MG/G
CREAM TOPICAL
Refills: 0 | COMMUNITY
Start: 2019-09-09 | End: 2019-12-19

## 2019-11-19 NOTE — PROGRESS NOTES
Chief Complaint   Patient presents with   • Hand Swelling     (LT) Hand is itchy , Chest itchy x 3 weeks        HISTORY OF PRESENT ILLNESS: Patient is a 84 y.o. male who presents to urgent care today with complaints of a rash. Notes that for the past four weeks he has had itching to his hands, chest, and some areas of of his legs.  He denies any new soaps, detergents, foods, medications, or other environmental changes.  He is concerned as he has recently noticed swelling, mild pain, and redness to his left hand.  He denies previous history of the same.  He has been using some leftover Bactroban without improvement.  He otherwise feels well denies any fever, chills, joint pain, or malaise.  He does not have a PCP.    Patient Active Problem List    Diagnosis Date Noted   • Bladder calculi 03/07/2016   • BPH (benign prostatic hypertrophy) with urinary obstruction 03/07/2016   • History of transurethral resection of prostate 02/17/2016   • Recurrent genital herpes 02/17/2016   • Insomnia 02/17/2016   • History of DVT of lower extremity 12/23/2015   • Osteopenia 04/09/2014   • BPH (benign prostatic hyperplasia) 05/07/2013   • Gingivitis, chronic 05/07/2013   • History of resection of meningioma 04/17/2013   • HLD (hyperlipidemia) 04/17/2013   • Mild intermittent asthma without complication 04/17/2013       Allergies:Other environmental and Lactose    Current Outpatient Medications Ordered in Epic   Medication Sig Dispense Refill   • triamcinolone acetonide (KENALOG) 0.1 % Cream APPLY TOPICALLY TWICE A DAY TO RASH ON RIGHT MEDIAL KNEE UP TO 2 WEEKS/MONTH AS NEEDED  0   • mupirocin (BACTROBAN) 2 % Ointment CLEAN AND APPLY TOPICALLY TO AFFECTED AREA BEHIND RIGHT EAR AND TO HEALING LESION LEFT FOREHEAD TWICE A DAY (CAN SELF ADMINISTER)  0   • furosemide (LASIX) 40 MG Tab   11   • ASPIRIN LOW DOSE 81 MG EC tablet   11   • aspirin (ASPIRIN LOW DOSE) 81 MG EC tablet aspirin low dose 81 mg tbec     • chlorhexidine (PERIDEX) 0.12  % Solution chlorhexidine gluconate 0.12 % soln     • diazePAM (VALIUM) 5 MG Tab diazepam 5 mg tabs     • apixaban (ELIQUIS) 2.5mg Tab eliquis 2.5 mg tabs     • MELATONIN ER PO melatonin     • doxycycline (PERIOSTAT) 20 MG tablet Take 20 mg by mouth 2 times a day. gingivitis     • famotidine (PEPCID) 20 MG Tab Take 20 mg by mouth 2 times a day.     • omeprazole (PRILOSEC OTC) 20 MG tablet Take 40 mg by mouth 3 times a day before meals.     • Mometasone Furo-Formoterol Fum (DULERA) 100-5 MCG/ACT Aerosol Inhale 2 Puffs by mouth 2 Times a Day.     • tramadol (ULTRAM) 50 MG Tab Take 50 mg by mouth every four hours as needed.     • rivaroxaban (XARELTO) 15 MG Tab tablet Take 20 mg by mouth with dinner.     • doxycycline (PERIOSTAT) 20 MG tablet Take 20 mg by mouth 2 times a day.     • vitamin D (CHOLECALCIFEROL) 1000 UNIT Tab Take 1,000 Units by mouth every day.     • Psyllium (METAMUCIL PO) Take 1 Each by mouth every 48 hours as needed.     • acyclovir (ZOVIRAX) 800 MG Tab Take 800 mg by mouth 2 times a day as needed (for coldsores).     • albuterol (VENTOLIN OR PROVENTIL) 108 (90 BASE) MCG/ACT Aero Soln inhalation aerosol Inhale 2 Puffs by mouth every bedtime.     • simvastatin (ZOCOR) 10 MG Tab Take 10 mg by mouth every bedtime.     • tamsulosin (FLOMAX) 0.4 MG capsule Take 0.8 mg by mouth every bedtime.     • clonazepam (KLONOPIN) 1 MG Tab Take 2 mg by mouth every bedtime.       No current Epic-ordered facility-administered medications on file.        Past Medical History:   Diagnosis Date   • Allergy     grass and weeds   • Anxiety    • Arrhythmia    • Arthritis    • ASTHMA     inhalers as needed   • Blood clotting disorder (HCC) 2015, 1/2016    right leg Was on Eliquis, patient stopped for surgery   • Bowel habit changes     constipation   • CATARACT     removed bilat   • Chronic gingivitis 2/17/2016   • Cold     about 2 weeks ago (2/2016)   • Enlarged prostate    • Headache(784.0)    • Heart murmur    • History of  DVT of lower extremity 2015   • History of transurethral resection of prostate 2016   • Hyperlipidemia    • IBD (inflammatory bowel disease)    • Indigestion    • Insomnia 2016   • OSTEOPOROSIS    • Recurrent genital herpes 2016   • Renal disorder    • Urinary incontinence    • Urinary tract infection, site not specified        Social History     Tobacco Use   • Smoking status: Former Smoker     Packs/day: 1.50     Years: 30.00     Pack years: 45.00     Types: Cigarettes     Last attempt to quit: 1998     Years since quittin.8   • Smokeless tobacco: Never Used   • Tobacco comment: avoid all tobacco products   Substance Use Topics   • Alcohol use: Yes     Alcohol/week: 1.0 oz     Types: 2 Shots of liquor per week     Comment: 2 per week   • Drug use: No       Family Status   Relation Name Status   • Mo     • Fa     • MAunt     • MUnc     • PUnc     • MGMo     • MGFa     • PGMo     • PGFa       Family History   Problem Relation Age of Onset   • Cancer Father         Lung   • Heart Disease Father    • Hypertension Father    • Hyperlipidemia Father    • Heart Disease Maternal Uncle    • Hypertension Maternal Uncle    • Hyperlipidemia Maternal Uncle    • Stroke Maternal Uncle    • Cancer Paternal Uncle         Leukemia       ROS:  Review of Systems   Constitutional: Negative for fever, chills, weight loss, malaise, and fatigue.   HENT: Negative for ear pain, nosebleeds, congestion, sore throat and neck pain.    Eyes: Negative for vision changes.   Neuro: Negative for headache, sensory changes, weakness, seizure, LOC.   Cardiovascular: Negative for chest pain, palpitations, orthopnea and leg swelling.   Respiratory: Negative for cough, sputum production, shortness of breath and wheezing.   Gastrointestinal: Negative for abdominal pain, nausea, vomiting or diarrhea.   Genitourinary: Negative for dysuria, urgency and  "frequency.  Musculoskeletal: Positive for left hand swelling.  Negative for falls, neck pain, back pain, joint pain, myalgias.   Skin: Positive for rash, erythema.  Negative for diaphoresis.     Exam:  /56   Pulse 62   Temp 36.8 °C (98.3 °F)   Resp 18   Ht 1.55 m (5' 1.02\")   Wt 70.8 kg (156 lb)   SpO2 96%   General: well-nourished, well-developed male in NAD  Head: normocephalic, atraumatic  Eyes: PERRLA, no conjunctival injection, acuity grossly intact, lids normal.  Ears: normal shape and symmetry, no tenderness, no discharge. External canals are without any significant edema or erythema. Tympanic membranes are without any inflammation, no effusion. Gross auditory acuity is intact.  Nose: symmetrical without tenderness, no discharge.  Mouth/Throat: reasonable hygiene, no erythema, exudates or tonsillar enlargement.  Neck: no masses, range of motion within normal limits, no tracheal deviation. No obvious thyroid enlargement.   Lymph: no cervical adenopathy. No supraclavicular adenopathy.   Neuro: alert and oriented. Cranial nerves 1-12 grossly intact. No sensory deficit.   Cardiovascular: regular rate and rhythm. No edema.  Pulmonary: no distress. Chest is symmetrical with respiration, no wheezes, crackles, or rhonchi.   Musculoskeletal: no clubbing, appropriate muscle tone, gait is stable.  Left hand has mild generalized swelling and erythema, no tenderness.  Skin: warm, dry, intact, no clubbing, no cyanosis. There is a scattered, raised, erythematous and confluent rash to anterior chest, bilateral hands.  There is no rash to legs at this point.  Psych: appropriate mood, affect, judgement.         Assessment/Plan:  1. Dermatitis  triamcinolone acetonide (KENALOG) 0.1 % Ointment    REFERRAL TO DERMATOLOGY   2. Cellulitis of left hand  REFERRAL TO DERMATOLOGY    cephALEXin (KEFLEX) 250 MG Cap         Suspect dermatitis.  Kenalog as directed.  Suspect secondary infectious process to left hand from " itching, refrain from itching, Keflex as directed.  Return to clinic in 5 to 7 days for reevaluation.  Referral placed to dermatology.  Supportive care, differential diagnoses, and indications for immediate follow-up discussed with patient.   Pathogenesis of diagnosis discussed including typical length and natural progression.   Instructed to return to clinic or nearest emergency department for any change in condition, further concerns, or worsening of symptoms.  Patient states understanding of the plan of care and discharge instructions.  Instructed to make an appointment, to establish care, with a primary care provider.        Please note that this dictation was created using voice recognition software. I have made every reasonable attempt to correct obvious errors, but I expect that there are errors of grammar and possibly content that I did not discover before finalizing the note.      TIFFANIE Messina.

## 2019-11-20 ENCOUNTER — TELEPHONE (OUTPATIENT)
Dept: URGENT CARE | Facility: CLINIC | Age: 84
End: 2019-11-20

## 2019-11-20 NOTE — TELEPHONE ENCOUNTER
Pt resident caregivers called regarding the medications that you prescribed to him, They were wondering if you can fax over an authorization for clearance that it is ok to have it in his room since they are the only ones suppose to give them medications.

## 2019-11-22 ENCOUNTER — TELEPHONE (OUTPATIENT)
Dept: URGENT CARE | Facility: CLINIC | Age: 84
End: 2019-11-22

## 2019-11-22 NOTE — TELEPHONE ENCOUNTER
[facility] and [facility] medical authorization form is on the  scanned. Please sign and fax back for clearance thank you.

## 2019-11-25 ENCOUNTER — APPOINTMENT (RX ONLY)
Dept: URBAN - METROPOLITAN AREA CLINIC 20 | Facility: CLINIC | Age: 84
Setting detail: DERMATOLOGY
End: 2019-11-25

## 2019-11-25 DIAGNOSIS — I87.2 VENOUS INSUFFICIENCY (CHRONIC) (PERIPHERAL): ICD-10-CM

## 2019-11-25 DIAGNOSIS — L29.89 OTHER PRURITUS: ICD-10-CM

## 2019-11-25 DIAGNOSIS — L84 CORNS AND CALLOSITIES: ICD-10-CM

## 2019-11-25 DIAGNOSIS — L29.8 OTHER PRURITUS: ICD-10-CM

## 2019-11-25 PROBLEM — I10 ESSENTIAL (PRIMARY) HYPERTENSION: Status: ACTIVE | Noted: 2019-11-25

## 2019-11-25 PROBLEM — J45.909 UNSPECIFIED ASTHMA, UNCOMPLICATED: Status: ACTIVE | Noted: 2019-11-25

## 2019-11-25 PROCEDURE — ? MEDICATION COUNSELING

## 2019-11-25 PROCEDURE — ? ADDITIONAL NOTES

## 2019-11-25 PROCEDURE — ? COUNSELING

## 2019-11-25 PROCEDURE — 99203 OFFICE O/P NEW LOW 30 MIN: CPT

## 2019-11-25 PROCEDURE — ? PRESCRIPTION

## 2019-11-25 RX ORDER — TRIAMCINOLONE ACETONIDE 1 MG/G
OINTMENT TOPICAL
Qty: 1 | Refills: 11 | Status: ERX

## 2019-11-25 ASSESSMENT — LOCATION DETAILED DESCRIPTION DERM
LOCATION DETAILED: LEFT RADIAL DORSAL HAND
LOCATION DETAILED: LEFT DISTAL PRETIBIAL REGION
LOCATION DETAILED: RIGHT ULNAR DORSAL HAND
LOCATION DETAILED: RIGHT DISTAL DORSAL FOREARM
LOCATION DETAILED: LEFT FOREHEAD
LOCATION DETAILED: RIGHT PROXIMAL PRETIBIAL REGION
LOCATION DETAILED: LEFT DISTAL DORSAL FOREARM

## 2019-11-25 ASSESSMENT — LOCATION ZONE DERM
LOCATION ZONE: ARM
LOCATION ZONE: FACE
LOCATION ZONE: HAND
LOCATION ZONE: LEG

## 2019-11-25 ASSESSMENT — LOCATION SIMPLE DESCRIPTION DERM
LOCATION SIMPLE: RIGHT HAND
LOCATION SIMPLE: LEFT HAND
LOCATION SIMPLE: RIGHT PRETIBIAL REGION
LOCATION SIMPLE: LEFT FOREARM
LOCATION SIMPLE: RIGHT FOREARM
LOCATION SIMPLE: LEFT FOREHEAD
LOCATION SIMPLE: LEFT PRETIBIAL REGION

## 2019-11-25 NOTE — PROCEDURE: MEDICATION COUNSELING
Humira Pregnancy And Lactation Text: This medication is Pregnancy Category B and is considered safe during pregnancy. It is unknown if this medication is excreted in breast milk.
Eucrisa Counseling: Patient may experience a mild burning sensation during topical application. Eucrisa is not approved in children less than 2 years of age.
Xeljanz Counseling: I discussed with the patient the risks of Xeljanz therapy including increased risk of infection, liver issues, headache, diarrhea, or cold symptoms. Live vaccines should be avoided. They were instructed to call if they have any problems.
Propranolol Counseling:  I discussed with the patient the risks of propranolol including but not limited to low heart rate, low blood pressure, low blood sugar, restlessness and increased cold sensitivity. They should call the office if they experience any of these side effects.
Rifampin Pregnancy And Lactation Text: This medication is Pregnancy Category C and it isn't know if it is safe during pregnancy. It is also excreted in breast milk and should not be used if you are breast feeding.
Hydroxyzine Counseling: Patient advised that the medication is sedating and not to drive a car after taking this medication.  Patient informed of potential adverse effects including but not limited to dry mouth, urinary retention, and blurry vision.  The patient verbalized understanding of the proper use and possible adverse effects of hydroxyzine.  All of the patient's questions and concerns were addressed.
Acitretin Pregnancy And Lactation Text: This medication is Pregnancy Category X and should not be given to women who are pregnant or may become pregnant in the future. This medication is excreted in breast milk.
Topical Clindamycin Pregnancy And Lactation Text: This medication is Pregnancy Category B and is considered safe during pregnancy. It is unknown if it is excreted in breast milk.
Rifampin Counseling: I discussed with the patient the risks of rifampin including but not limited to liver damage, kidney damage, red-orange body fluids, nausea/vomiting and severe allergy.
Erivedge Counseling- I discussed with the patient the risks of Erivedge including but not limited to nausea, vomiting, diarrhea, constipation, weight loss, changes in the sense of taste, decreased appetite, muscle spasms, and hair loss.  The patient verbalized understanding of the proper use and possible adverse effects of Erivedge.  All of the patient's questions and concerns were addressed.
Propranolol Pregnancy And Lactation Text: This medication is Pregnancy Category C and it isn't known if it is safe during pregnancy. It is excreted in breast milk.
Azithromycin Pregnancy And Lactation Text: This medication is considered safe during pregnancy and is also secreted in breast milk.
Eucrisa Pregnancy And Lactation Text: This medication has not been assigned a Pregnancy Risk Category but animal studies failed to show danger with the topical medication. It is unknown if the medication is excreted in breast milk.
Ilumya Counseling: I discussed with the patient the risks of tildrakizumab including but not limited to immunosuppression, malignancy, posterior leukoencephalopathy syndrome, and serious infections.  The patient understands that monitoring is required including a PPD at baseline and must alert us or the primary physician if symptoms of infection or other concerning signs are noted.
Finasteride Male Counseling: Finasteride Counseling:  I discussed with the patient the risks of use of finasteride including but not limited to decreased libido, decreased ejaculate volume, gynecomastia, and depression. Women should not handle medication.  All of the patient's questions and concerns were addressed.
Topical Sulfur Applications Pregnancy And Lactation Text: This medication is Pregnancy Category C and has an unknown safety profile during pregnancy. It is unknown if this topical medication is excreted in breast milk.
Tetracycline Counseling: Patient counseled regarding possible photosensitivity and increased risk for sunburn.  Patient instructed to avoid sunlight, if possible.  When exposed to sunlight, patients should wear protective clothing, sunglasses, and sunscreen.  The patient was instructed to call the office immediately if the following severe adverse effects occur:  hearing changes, easy bruising/bleeding, severe headache, or vision changes.  The patient verbalized understanding of the proper use and possible adverse effects of tetracycline.  All of the patient's questions and concerns were addressed. Patient understands to avoid pregnancy while on therapy due to potential birth defects.
Erivedge Pregnancy And Lactation Text: This medication is Pregnancy Category X and is absolutely contraindicated during pregnancy. It is unknown if it is excreted in breast milk.
Xeltyz Pregnancy And Lactation Text: This medication is Pregnancy Category D and is not considered safe during pregnancy.  The risk during breast feeding is also uncertain.
Topical Sulfur Applications Counseling: Topical Sulfur Counseling: Patient counseled that this medication may cause skin irritation or allergic reactions.  In the event of skin irritation, the patient was advised to reduce the amount of the drug applied or use it less frequently.   The patient verbalized understanding of the proper use and possible adverse effects of topical sulfur application.  All of the patient's questions and concerns were addressed.
Bexarotene Counseling:  I discussed with the patient the risks of bexarotene including but not limited to hair loss, dry lips/skin/eyes, liver abnormalities, hyperlipidemia, pancreatitis, depression/suicidal ideation, photosensitivity, drug rash/allergic reactions, hypothyroidism, anemia, leukopenia, infection, cataracts, and teratogenicity.  Patient understands that they will need regular blood tests to check lipid profile, liver function tests, white blood cell count, thyroid function tests and pregnancy test if applicable.
Hydroxyzine Pregnancy And Lactation Text: This medication is not safe during pregnancy and should not be taken. It is also excreted in breast milk and breast feeding isn't recommended.
Wartpeel Counseling:  I discussed with the patient the risks of Wartpeel including but not limited to erythema, scaling, itching, weeping, crusting, and pain.
Birth Control Pills Counseling: Birth Control Pill Counseling: I discussed with the patient the potential side effects of OCPs including but not limited to increased risk of stroke, heart attack, thrombophlebitis, deep venous thrombosis, hepatic adenomas, breast changes, GI upset, headaches, and depression.  The patient verbalized understanding of the proper use and possible adverse effects of OCPs. All of the patient's questions and concerns were addressed.
Tetracycline Pregnancy And Lactation Text: This medication is Pregnancy Category D and not consider safe during pregnancy. It is also excreted in breast milk.
Xolair Counseling:  Patient informed of potential adverse effects including but not limited to fever, muscle aches, rash and allergic reactions.  The patient verbalized understanding of the proper use and possible adverse effects of Xolair.  All of the patient's questions and concerns were addressed.
Finasteride Pregnancy And Lactation Text: This medication is absolutely contraindicated during pregnancy. It is unknown if it is excreted in breast milk.
Bexarotene Pregnancy And Lactation Text: This medication is Pregnancy Category X and should not be given to women who are pregnant or may become pregnant. This medication should not be used if you are breast feeding.
Ilumya Pregnancy And Lactation Text: The risk during pregnancy and breastfeeding is uncertain with this medication.
Hydroquinone Counseling:  Patient advised that medication may result in skin irritation, lightening (hypopigmentation), dryness, and burning.  In the event of skin irritation, the patient was advised to reduce the amount of the drug applied or use it less frequently.  Rarely, spots that are treated with hydroquinone can become darker (pseudoochronosis).  Should this occur, patient instructed to stop medication and call the office. The patient verbalized understanding of the proper use and possible adverse effects of hydroquinone.  All of the patient's questions and concerns were addressed.
Bactrim Counseling:  I discussed with the patient the risks of sulfa antibiotics including but not limited to GI upset, allergic reaction, drug rash, diarrhea, dizziness, photosensitivity, and yeast infections.  Rarely, more serious reactions can occur including but not limited to aplastic anemia, agranulocytosis, methemoglobinemia, blood dyscrasias, liver or kidney failure, lung infiltrates or desquamative/blistering drug rashes.
Gabapentin Counseling: I discussed with the patient the risks of gabapentin including but not limited to dizziness, somnolence, fatigue and ataxia.
Wartpeel Pregnancy And Lactation Text: This medication is Pregnancy Category X and contraindicated in pregnancy and in women who may become pregnant. It is unknown if this medication is excreted in breast milk.
Albendazole Pregnancy And Lactation Text: This medication is Pregnancy Category C and it isn't known if it is safe during pregnancy. It is also excreted in breast milk.
Isotretinoin Pregnancy And Lactation Text: This medication is Pregnancy Category X and is considered extremely dangerous during pregnancy. It is unknown if it is excreted in breast milk.
Infliximab Counseling:  I discussed with the patient the risks of infliximab including but not limited to myelosuppression, immunosuppression, autoimmune hepatitis, demyelinating diseases, lymphoma, and serious infections.  The patient understands that monitoring is required including a PPD at baseline and must alert us or the primary physician if symptoms of infection or other concerning signs are noted.
Xolair Pregnancy And Lactation Text: This medication is Pregnancy Category B and is considered safe during pregnancy. This medication is excreted in breast milk.
Birth Control Pills Pregnancy And Lactation Text: This medication should be avoided if pregnant and for the first 30 days post-partum.
Albendazole Counseling:  I discussed with the patient the risks of albendazole including but not limited to cytopenia, kidney damage, nausea/vomiting and severe allergy.  The patient understands that this medication is being used in an off-label manner.
Isotretinoin Counseling: Patient should get monthly blood tests, not donate blood, not drive at night if vision affected, not share medication, and not undergo elective surgery for 6 months after tx completed. Side effects reviewed, pt to contact office should one occur.
Bactrim Pregnancy And Lactation Text: This medication is Pregnancy Category D and is known to cause fetal risk.  It is also excreted in breast milk.
High Dose Vitamin A Counseling: Side effects reviewed, pt to contact office should one occur.
Ivermectin Counseling:  Patient instructed to take medication on an empty stomach with a full glass of water.  Patient informed of potential adverse effects including but not limited to nausea, diarrhea, dizziness, itching, and swelling of the extremities or lymph nodes.  The patient verbalized understanding of the proper use and possible adverse effects of ivermectin.  All of the patient's questions and concerns were addressed.
Rituxan Counseling:  I discussed with the patient the risks of Rituxan infusions. Side effects can include infusion reactions, severe drug rashes including mucocutaneous reactions, reactivation of latent hepatitis and other infections and rarely progressive multifocal leukoencephalopathy.  All of the patient's questions and concerns were addressed.
Imiquimod Pregnancy And Lactation Text: This medication is Pregnancy Category C. It is unknown if this medication is excreted in breast milk.
Cephalexin Pregnancy And Lactation Text: This medication is Pregnancy Category B and considered safe during pregnancy.  It is also excreted in breast milk but can be used safely for shorter doses.
Fluconazole Counseling:  Patient counseled regarding adverse effects of fluconazole including but not limited to headache, diarrhea, nausea, upset stomach, liver function test abnormalities, taste disturbance, and stomach pain.  There is a rare possibility of liver failure that can occur when taking fluconazole.  The patient understands that monitoring of LFTs and kidney function test may be required, especially at baseline. The patient verbalized understanding of the proper use and possible adverse effects of fluconazole.  All of the patient's questions and concerns were addressed.
Spironolactone Counseling: Patient advised regarding risks of diarrhea, abdominal pain, hyperkalemia, birth defects (for female patients), liver toxicity and renal toxicity. The patient may need blood work to monitor liver and kidney function and potassium levels while on therapy. The patient verbalized understanding of the proper use and possible adverse effects of spironolactone.  All of the patient's questions and concerns were addressed.
Cephalexin Counseling: I counseled the patient regarding use of cephalexin as an antibiotic for prophylactic and/or therapeutic purposes. Cephalexin (commonly prescribed under brand name Keflex) is a cephalosporin antibiotic which is active against numerous classes of bacteria, including most skin bacteria. Side effects may include nausea, diarrhea, gastrointestinal upset, rash, hives, yeast infections, and in rare cases, hepatitis, kidney disease, seizures, fever, confusion, neurologic symptoms, and others. Patients with severe allergies to penicillin medications are cautioned that there is about a 10% incidence of cross-reactivity with cephalosporins. When possible, patients with penicillin allergies should use alternatives to cephalosporins for antibiotic therapy.
Imiquimod Counseling:  I discussed with the patient the risks of imiquimod including but not limited to erythema, scaling, itching, weeping, crusting, and pain.  Patient understands that the inflammatory response to imiquimod is variable from person to person and was educated regarded proper titration schedule.  If flu-like symptoms develop, patient knows to discontinue the medication and contact us.
Gabapentin Pregnancy And Lactation Text: This medication is Pregnancy Category C and isn't considered safe during pregnancy. It is excreted in breast milk.
Zyclara Counseling:  I discussed with the patient the risks of imiquimod including but not limited to erythema, scaling, itching, weeping, crusting, and pain.  Patient understands that the inflammatory response to imiquimod is variable from person to person and was educated regarded proper titration schedule.  If flu-like symptoms develop, patient knows to discontinue the medication and contact us.
Rituxan Pregnancy And Lactation Text: This medication is Pregnancy Category C and it isn't know if it is safe during pregnancy. It is unknown if this medication is excreted in breast milk but similar antibodies are known to be excreted.
Azathioprine Pregnancy And Lactation Text: This medication is Pregnancy Category D and isn't considered safe during pregnancy. It is unknown if this medication is excreted in breast milk.
High Dose Vitamin A Pregnancy And Lactation Text: High dose vitamin A therapy is contraindicated during pregnancy and breast feeding.
Griseofulvin Counseling:  I discussed with the patient the risks of griseofulvin including but not limited to photosensitivity, cytopenia, liver damage, nausea/vomiting and severe allergy.  The patient understands that this medication is best absorbed when taken with a fatty meal (e.g., ice cream or french fries).
Minoxidil Counseling: Minoxidil is a topical medication which can increase blood flow where it is applied. It is uncertain how this medication increases hair growth. Side effects are uncommon and include stinging and allergic reactions.
SSKI Counseling:  I discussed with the patient the risks of SSKI including but not limited to thyroid abnormalities, metallic taste, GI upset, fever, headache, acne, arthralgias, paraesthesias, lymphadenopathy, easy bleeding, arrhythmias, and allergic reaction.
Clindamycin Counseling: I counseled the patient regarding use of clindamycin as an antibiotic for prophylactic and/or therapeutic purposes. Clindamycin is active against numerous classes of bacteria, including skin bacteria. Side effects may include nausea, diarrhea, gastrointestinal upset, rash, hives, yeast infections, and in rare cases, colitis.
Spironolactone Pregnancy And Lactation Text: This medication can cause feminization of the male fetus and should be avoided during pregnancy. The active metabolite is also found in breast milk.
Fluconazole Pregnancy And Lactation Text: This medication is Pregnancy Category C and it isn't know if it is safe during pregnancy. It is also excreted in breast milk.
Azathioprine Counseling:  I discussed with the patient the risks of azathioprine including but not limited to myelosuppression, immunosuppression, hepatotoxicity, lymphoma, and infections.  The patient understands that monitoring is required including baseline LFTs, Creatinine, possible TPMP genotyping and weekly CBCs for the first month and then every 2 weeks thereafter.  The patient verbalized understanding of the proper use and possible adverse effects of azathioprine.  All of the patient's questions and concerns were addressed.
Glycopyrrolate Counseling:  I discussed with the patient the risks of glycopyrrolate including but not limited to skin rash, drowsiness, dry mouth, difficulty urinating, and blurred vision.
Griseofulvin Pregnancy And Lactation Text: This medication is Pregnancy Category X and is known to cause serious birth defects. It is unknown if this medication is excreted in breast milk but breast feeding should be avoided.
Sski Pregnancy And Lactation Text: This medication is Pregnancy Category D and isn't considered safe during pregnancy. It is excreted in breast milk.
Clindamycin Pregnancy And Lactation Text: This medication can be used in pregnancy if certain situations. Clindamycin is also present in breast milk.
Siliq Counseling:  I discussed with the patient the risks of Siliq including but not limited to new or worsening depression, suicidal thoughts and behavior, immunosuppression, malignancy, posterior leukoencephalopathy syndrome, and serious infections.  The patient understands that monitoring is required including a PPD at baseline and must alert us or the primary physician if symptoms of infection or other concerning signs are noted. There is also a special program designed to monitor depression which is required with Siliq.
Hydroxychloroquine Counseling:  I discussed with the patient that a baseline ophthalmologic exam is needed at the start of therapy and every year thereafter while on therapy. A CBC may also be warranted for monitoring.  The side effects of this medication were discussed with the patient, including but not limited to agranulocytosis, aplastic anemia, seizures, rashes, retinopathy, and liver toxicity. Patient instructed to call the office should any adverse effect occur.  The patient verbalized understanding of the proper use and possible adverse effects of Plaquenil.  All the patient's questions and concerns were addressed.
Opioid Counseling: I discussed with the patient the potential side effects of opioids including but not limited to addiction, altered mental status, and depression. I stressed avoiding alcohol, benzodiazepines, muscle relaxants and sleep aids unless specifically okayed by a physician. The patient verbalized understanding of the proper use and possible adverse effects of opioids. All of the patient's questions and concerns were addressed. They were instructed to flush the remaining pills down the toilet if they did not need them for pain.
Cellcept Counseling:  I discussed with the patient the risks of mycophenolate mofetil including but not limited to infection/immunosuppression, GI upset, hypokalemia, hypercholesterolemia, bone marrow suppression, lymphoproliferative disorders, malignancy, GI ulceration/bleed/perforation, colitis, interstitial lung disease, kidney failure, progressive multifocal leukoencephalopathy, and birth defects.  The patient understands that monitoring is required including a baseline creatinine and regular CBC testing. In addition, patient must alert us immediately if symptoms of infection or other concerning signs are noted.
Glycopyrrolate Pregnancy And Lactation Text: This medication is Pregnancy Category B and is considered safe during pregnancy. It is unknown if it is excreted breast milk.
Detail Level: Simple
Opioid Pregnancy And Lactation Text: These medications can lead to premature delivery and should be avoided during pregnancy. These medications are also present in breast milk in small amounts.
Thalidomide Counseling: I discussed with the patient the risks of thalidomide including but not limited to birth defects, anxiety, weakness, chest pain, dizziness, cough and severe allergy.
Itraconazole Counseling:  I discussed with the patient the risks of itraconazole including but not limited to liver damage, nausea/vomiting, neuropathy, and severe allergy.  The patient understands that this medication is best absorbed when taken with acidic beverages such as non-diet cola or ginger ale.  The patient understands that monitoring is required including baseline LFTs and repeat LFTs at intervals.  The patient understands that they are to contact us or the primary physician if concerning signs are noted.
Hydroxychloroquine Pregnancy And Lactation Text: This medication has been shown to cause fetal harm but it isn't assigned a Pregnancy Risk Category. There are small amounts excreted in breast milk.
Mirvaso Counseling: Mirvaso is a topical medication which can decrease superficial blood flow where applied. Side effects are uncommon and include stinging, redness and allergic reactions.
Doxycycline Counseling:  Patient counseled regarding possible photosensitivity and increased risk for sunburn.  Patient instructed to avoid sunlight, if possible.  When exposed to sunlight, patients should wear protective clothing, sunglasses, and sunscreen.  The patient was instructed to call the office immediately if the following severe adverse effects occur:  hearing changes, easy bruising/bleeding, severe headache, or vision changes.  The patient verbalized understanding of the proper use and possible adverse effects of doxycycline.  All of the patient's questions and concerns were addressed.
Niacinamide Counseling: I recommended taking niacin or niacinamide, also know as vitamin B3, twice daily. Recent evidence suggests that taking vitamin B3 (500 mg twice daily) can reduce the risk of actinic keratoses and non-melanoma skin cancers. Side effects of vitamin B3 include flushing and headache.
Cyclophosphamide Counseling:  I discussed with the patient the risks of cyclophosphamide including but not limited to hair loss, hormonal abnormalities, decreased fertility, abdominal pain, diarrhea, nausea and vomiting, bone marrow suppression and infection. The patient understands that monitoring is required while taking this medication.
Benzoyl Peroxide Counseling: Patient counseled that medicine may cause skin irritation and bleach clothing.  In the event of skin irritation, the patient was advised to reduce the amount of the drug applied or use it less frequently.   The patient verbalized understanding of the proper use and possible adverse effects of benzoyl peroxide.  All of the patient's questions and concerns were addressed.
Mirvaso Pregnancy And Lactation Text: This medication has not been assigned a Pregnancy Risk Category. It is unknown if the medication is excreted in breast milk.
Simponi Counseling:  I discussed with the patient the risks of golimumab including but not limited to myelosuppression, immunosuppression, autoimmune hepatitis, demyelinating diseases, lymphoma, and serious infections.  The patient understands that monitoring is required including a PPD at baseline and must alert us or the primary physician if symptoms of infection or other concerning signs are noted.
Doxycycline Pregnancy And Lactation Text: This medication is Pregnancy Category D and not consider safe during pregnancy. It is also excreted in breast milk but is considered safe for shorter treatment courses.
Skyrizi Counseling: I discussed with the patient the risks of risankizumab-rzaa including but not limited to immunosuppression, and serious infections.  The patient understands that monitoring is required including a PPD at baseline and must alert us or the primary physician if symptoms of infection or other concerning signs are noted.
Tranexamic Acid Counseling:  Patient advised of the small risk of bleeding problems with tranexamic acid. They were also instructed to call if they developed any nausea, vomiting or diarrhea. All of the patient's questions and concerns were addressed.
Erythromycin Pregnancy And Lactation Text: This medication is Pregnancy Category B and is considered safe during pregnancy. It is also excreted in breast milk.
Cimzia Counseling:  I discussed with the patient the risks of Cimzia including but not limited to immunosuppression, allergic reactions and infections.  The patient understands that monitoring is required including a PPD at baseline and must alert us or the primary physician if symptoms of infection or other concerning signs are noted.
Benzoyl Peroxide Pregnancy And Lactation Text: This medication is Pregnancy Category C. It is unknown if benzoyl peroxide is excreted in breast milk.
Erythromycin Counseling:  I discussed with the patient the risks of erythromycin including but not limited to GI upset, allergic reaction, drug rash, diarrhea, increase in liver enzymes, and yeast infections.
Niacinamide Pregnancy And Lactation Text: These medications are considered safe during pregnancy.
Picato Counseling:  I discussed with the patient the risks of Picato including but not limited to erythema, scaling, itching, weeping, crusting, and pain.
Include Pregnancy/Lactation Warning?: No
Ketoconazole Counseling:   Patient counseled regarding improving absorption with orange juice.  Adverse effects include but are not limited to breast enlargement, headache, diarrhea, nausea, upset stomach, liver function test abnormalities, taste disturbance, and stomach pain.  There is a rare possibility of liver failure that can occur when taking ketoconazole. The patient understands that monitoring of LFTs may be required, especially at baseline. The patient verbalized understanding of the proper use and possible adverse effects of ketoconazole.  All of the patient's questions and concerns were addressed.
Arava Counseling:  Patient counseled regarding adverse effects of Arava including but not limited to nausea, vomiting, abnormalities in liver function tests. Patients may develop mouth sores, rash, diarrhea, and abnormalities in blood counts. The patient understands that monitoring is required including LFTs and blood counts.  There is a rare possibility of scarring of the liver and lung problems that can occur when taking methotrexate. Persistent nausea, loss of appetite, pale stools, dark urine, cough, and shortness of breath should be reported immediately. Patient advised to discontinue Arava treatment and consult with a physician prior to attempting conception. The patient will have to undergo a treatment to eliminate Arava from the body prior to conception.
Cyclophosphamide Pregnancy And Lactation Text: This medication is Pregnancy Category D and it isn't considered safe during pregnancy. This medication is excreted in breast milk.
Metronidazole Counseling:  I discussed with the patient the risks of metronidazole including but not limited to seizures, nausea/vomiting, a metallic taste in the mouth, nausea/vomiting and severe allergy.
Cyclosporine Pregnancy And Lactation Text: This medication is Pregnancy Category C and it isn't know if it is safe during pregnancy. This medication is excreted in breast milk.
Terbinafine Counseling: Patient counseling regarding adverse effects of terbinafine including but not limited to headache, diarrhea, rash, upset stomach, liver function test abnormalities, itching, taste/smell disturbance, nausea, abdominal pain, and flatulence.  There is a rare possibility of liver failure that can occur when taking terbinafine.  The patient understands that a baseline LFT and kidney function test may be required. The patient verbalized understanding of the proper use and possible adverse effects of terbinafine.  All of the patient's questions and concerns were addressed.
Cosentyx Counseling:  I discussed with the patient the risks of Cosentyx including but not limited to worsening of Crohn's disease, immunosuppression, allergic reactions and infections.  The patient understands that monitoring is required including a PPD at baseline and must alert us or the primary physician if symptoms of infection or other concerning signs are noted.
Clofazimine Counseling:  I discussed with the patient the risks of clofazimine including but not limited to skin and eye pigmentation, liver damage, nausea/vomiting, gastrointestinal bleeding and allergy.
Ketoconazole Pregnancy And Lactation Text: This medication is Pregnancy Category C and it isn't know if it is safe during pregnancy. It is also excreted in breast milk and breast feeding isn't recommended.
Solaraze Counseling:  I discussed with the patient the risks of Solaraze including but not limited to erythema, scaling, itching, weeping, crusting, and pain.
Tranexamic Acid Pregnancy And Lactation Text: It is unknown if this medication is safe during pregnancy or breast feeding.
Cyclosporine Counseling:  I discussed with the patient the risks of cyclosporine including but not limited to hypertension, gingival hyperplasia,myelosuppression, immunosuppression, liver damage, kidney damage, neurotoxicity, lymphoma, and serious infections. The patient understands that monitoring is required including baseline blood pressure, CBC, CMP, lipid panel and uric acid, and then 1-2 times monthly CMP and blood pressure.
Carac Counseling:  I discussed with the patient the risks of Carac including but not limited to erythema, scaling, itching, weeping, crusting, and pain.
Cimzia Pregnancy And Lactation Text: This medication crosses the placenta but can be considered safe in certain situations. Cimzia may be excreted in breast milk.
Nsaids Counseling: NSAID Counseling: I discussed with the patient that NSAIDs should be taken with food. Prolonged use of NSAIDs can result in the development of stomach ulcers.  Patient advised to stop taking NSAIDs if abdominal pain occurs.  The patient verbalized understanding of the proper use and possible adverse effects of NSAIDs.  All of the patient's questions and concerns were addressed.
5-Fu Counseling: 5-Fluorouracil Counseling:  I discussed with the patient the risks of 5-fluorouracil including but not limited to erythema, scaling, itching, weeping, crusting, and pain.
Metronidazole Pregnancy And Lactation Text: This medication is Pregnancy Category B and considered safe during pregnancy.  It is also excreted in breast milk.
Protopic Pregnancy And Lactation Text: This medication is Pregnancy Category C. It is unknown if this medication is excreted in breast milk when applied topically.
Stelara Counseling:  I discussed with the patient the risks of ustekinumab including but not limited to immunosuppression, malignancy, posterior leukoencephalopathy syndrome, and serious infections.  The patient understands that monitoring is required including a PPD at baseline and must alert us or the primary physician if symptoms of infection or other concerning signs are noted.
Odomzo Counseling- I discussed with the patient the risks of Odomzo including but not limited to nausea, vomiting, diarrhea, constipation, weight loss, changes in the sense of taste, decreased appetite, muscle spasms, and hair loss.  The patient verbalized understanding of the proper use and possible adverse effects of Odomzo.  All of the patient's questions and concerns were addressed.
Methotrexate Counseling:  Patient counseled regarding adverse effects of methotrexate including but not limited to nausea, vomiting, abnormalities in liver function tests. Patients may develop mouth sores, rash, diarrhea, and abnormalities in blood counts. The patient understands that monitoring is required including LFT's and blood counts.  There is a rare possibility of scarring of the liver and lung problems that can occur when taking methotrexate. Persistent nausea, loss of appetite, pale stools, dark urine, cough, and shortness of breath should be reported immediately. Patient advised to discontinue methotrexate treatment at least three months before attempting to become pregnant.  I discussed the need for folate supplements while taking methotrexate.  These supplements can decrease side effects during methotrexate treatment. The patient verbalized understanding of the proper use and possible adverse effects of methotrexate.  All of the patient's questions and concerns were addressed.
Terbinafine Pregnancy And Lactation Text: This medication is Pregnancy Category B and is considered safe during pregnancy. It is also excreted in breast milk and breast feeding isn't recommended.
Valtrex Counseling: I discussed with the patient the risks of valacyclovir including but not limited to kidney damage, nausea, vomiting and severe allergy.  The patient understands that if the infection seems to be worsening or is not improving, they are to call.
Solaraze Pregnancy And Lactation Text: This medication is Pregnancy Category B and is considered safe. There is some data to suggest avoiding during the third trimester. It is unknown if this medication is excreted in breast milk.
Nsaids Pregnancy And Lactation Text: These medications are considered safe up to 30 weeks gestation. It is excreted in breast milk.
Protopic Counseling: Patient may experience a mild burning sensation during topical application. Protopic is not approved in children less than 2 years of age. There have been case reports of hematologic and skin malignancies in patients using topical calcineurin inhibitors although causality is questionable.
Colchicine Counseling:  Patient counseled regarding adverse effects including but not limited to stomach upset (nausea, vomiting, stomach pain, or diarrhea).  Patient instructed to limit alcohol consumption while taking this medication.  Colchicine may reduce blood counts especially with prolonged use.  The patient understands that monitoring of kidney function and blood counts may be required, especially at baseline. The patient verbalized understanding of the proper use and possible adverse effects of colchicine.  All of the patient's questions and concerns were addressed.
Azithromycin Counseling:  I discussed with the patient the risks of azithromycin including but not limited to GI upset, allergic reaction, drug rash, diarrhea, and yeast infections.
Rhofade Counseling: Rhofade is a topical medication which can decrease superficial blood flow where applied. Side effects are uncommon and include stinging, redness and allergic reactions.
Methotrexate Pregnancy And Lactation Text: This medication is Pregnancy Category X and is known to cause fetal harm. This medication is excreted in breast milk.
Dupixent Counseling: I discussed with the patient the risks of dupilumab including but not limited to eye infection and irritation, cold sores, injection site reactions, worsening of asthma, allergic reactions and increased risk of parasitic infection.  Live vaccines should be avoided while taking dupilumab. Dupilumab will also interact with certain medications such as warfarin and cyclosporine. The patient understands that monitoring is required and they must alert us or the primary physician if symptoms of infection or other concerning signs are noted.
Valtrex Pregnancy And Lactation Text: this medication is Pregnancy Category B and is considered safe during pregnancy. This medication is not directly found in breast milk but it's metabolite acyclovir is present.
Topical Retinoid counseling:  Patient advised to apply a pea-sized amount only at bedtime and wait 30 minutes after washing their face before applying.  If too drying, patient may add a non-comedogenic moisturizer. The patient verbalized understanding of the proper use and possible adverse effects of retinoids.  All of the patient's questions and concerns were addressed.
Minocycline Counseling: Patient advised regarding possible photosensitivity and discoloration of the teeth, skin, lips, tongue and gums.  Patient instructed to avoid sunlight, if possible.  When exposed to sunlight, patients should wear protective clothing, sunglasses, and sunscreen.  The patient was instructed to call the office immediately if the following severe adverse effects occur:  hearing changes, easy bruising/bleeding, severe headache, or vision changes.  The patient verbalized understanding of the proper use and possible adverse effects of minocycline.  All of the patient's questions and concerns were addressed.
Tazorac Counseling:  Patient advised that medication is irritating and drying.  Patient may need to apply sparingly and wash off after an hour before eventually leaving it on overnight.  The patient verbalized understanding of the proper use and possible adverse effects of tazorac.  All of the patient's questions and concerns were addressed.
Otezla Counseling: The side effects of Otezla were discussed with the patient, including but not limited to worsening or new depression, weight loss, diarrhea, nausea, upper respiratory tract infection, and headache. Patient instructed to call the office should any adverse effect occur.  The patient verbalized understanding of the proper use and possible adverse effects of Otezla.  All the patient's questions and concerns were addressed.
Cimetidine Counseling:  I discussed with the patient the risks of Cimetidine including but not limited to gynecomastia, headache, diarrhea, nausea, drowsiness, arrhythmias, pancreatitis, skin rashes, psychosis, bone marrow suppression and kidney toxicity.
Prednisone Counseling:  I discussed with the patient the risks of prolonged use of prednisone including but not limited to weight gain, insomnia, osteoporosis, mood changes, diabetes, susceptibility to infection, glaucoma and high blood pressure.  In cases where prednisone use is prolonged, patients should be monitored with blood pressure checks, serum glucose levels and an eye exam.  Additionally, the patient may need to be placed on GI prophylaxis, PCP prophylaxis, and calcium and vitamin D supplementation and/or a bisphosphonate.  The patient verbalized understanding of the proper use and the possible adverse effects of prednisone.  All of the patient's questions and concerns were addressed.
Drysol Counseling:  I discussed with the patient the risks of drysol/aluminum chloride including but not limited to skin rash, itching, irritation, burning.
Dupixent Pregnancy And Lactation Text: This medication likely crosses the placenta but the risk for the fetus is uncertain. This medication is excreted in breast milk.
Taltz Counseling: I discussed with the patient the risks of ixekizumab including but not limited to immunosuppression, serious infections, worsening of inflammatory bowel disease and drug reactions.  The patient understands that monitoring is required including a PPD at baseline and must alert us or the primary physician if symptoms of infection or other concerning signs are noted.
Tremfya Counseling: I discussed with the patient the risks of guselkumab including but not limited to immunosuppression, serious infections, worsening of inflammatory bowel disease and drug reactions.  The patient understands that monitoring is required including a PPD at baseline and must alert us or the primary physician if symptoms of infection or other concerning signs are noted.
Tazorac Pregnancy And Lactation Text: This medication is not safe during pregnancy. It is unknown if this medication is excreted in breast milk.
Drysol Pregnancy And Lactation Text: This medication is considered safe during pregnancy and breast feeding.
Enbrel Counseling:  I discussed with the patient the risks of etanercept including but not limited to myelosuppression, immunosuppression, autoimmune hepatitis, demyelinating diseases, lymphoma, and infections.  The patient understands that monitoring is required including a PPD at baseline and must alert us or the primary physician if symptoms of infection or other concerning signs are noted.
Otezla Pregnancy And Lactation Text: This medication is Pregnancy Category C and it isn't known if it is safe during pregnancy. It is unknown if it is excreted in breast milk.
Quinolones Counseling:  I discussed with the patient the risks of fluoroquinolones including but not limited to GI upset, allergic reaction, drug rash, diarrhea, dizziness, photosensitivity, yeast infections, liver function test abnormalities, tendonitis/tendon rupture.
Dapsone Counseling: I discussed with the patient the risks of dapsone including but not limited to hemolytic anemia, agranulocytosis, rashes, methemoglobinemia, kidney failure, peripheral neuropathy, headaches, GI upset, and liver toxicity.  Patients who start dapsone require monitoring including baseline LFTs and weekly CBCs for the first month, then every month thereafter.  The patient verbalized understanding of the proper use and possible adverse effects of dapsone.  All of the patient's questions and concerns were addressed.
Doxepin Pregnancy And Lactation Text: This medication is Pregnancy Category C and it isn't known if it is safe during pregnancy. It is also excreted in breast milk and breast feeding isn't recommended.
Acitretin Counseling:  I discussed with the patient the risks of acitretin including but not limited to hair loss, dry lips/skin/eyes, liver damage, hyperlipidemia, depression/suicidal ideation, photosensitivity.  Serious rare side effects can include but are not limited to pancreatitis, pseudotumor cerebri, bony changes, clot formation/stroke/heart attack.  Patient understands that alcohol is contraindicated since it can result in liver toxicity and significantly prolong the elimination of the drug by many years.
Dapsone Pregnancy And Lactation Text: This medication is Pregnancy Category C and is not considered safe during pregnancy or breast feeding.
Humira Counseling:  I discussed with the patient the risks of adalimumab including but not limited to myelosuppression, immunosuppression, autoimmune hepatitis, demyelinating diseases, lymphoma, and serious infections.  The patient understands that monitoring is required including a PPD at baseline and must alert us or the primary physician if symptoms of infection or other concerning signs are noted.
Oxybutynin Counseling:  I discussed with the patient the risks of oxybutynin including but not limited to skin rash, drowsiness, dry mouth, difficulty urinating, and blurred vision.
Doxepin Counseling:  Patient advised that the medication is sedating and not to drive a car after taking this medication. Patient informed of potential adverse effects including but not limited to dry mouth, urinary retention, and blurry vision.  The patient verbalized understanding of the proper use and possible adverse effects of doxepin.  All of the patient's questions and concerns were addressed.
Topical Clindamycin Counseling: Patient counseled that this medication may cause skin irritation or allergic reactions.  In the event of skin irritation, the patient was advised to reduce the amount of the drug applied or use it less frequently.   The patient verbalized understanding of the proper use and possible adverse effects of clindamycin.  All of the patient's questions and concerns were addressed.
Elidel Counseling: Patient may experience a mild burning sensation during topical application. Elidel is not approved in children less than 2 years of age. There have been case reports of hematologic and skin malignancies in patients using topical calcineurin inhibitors although causality is questionable.

## 2019-11-25 NOTE — PROCEDURE: ADDITIONAL NOTES
Additional Notes: Discussed condition in detail with patient.\\nRecommend cool cloth or ice pack on affected areas.\\nWill refill Triamcinolone Ointment today.
Detail Level: Simple
Additional Notes: Stable with compression stockings.
Additional Notes: Advised patient this appears to be caused from the pressure of his hat compressing the skin against the plate in his skull, causing the corn. \\nRecommend patient stop wearing hats to keep pressure off the area. \\nCan use Vaseline daily for healing. \\nWill recheck in 1 month.

## 2019-11-25 NOTE — HPI: RASH
How Severe Is Your Rash?: mild
Is This A New Presentation, Or A Follow-Up?: Rash
Additional History: Patient states he gets a rash that spreads to his legs and arms. The rash on his legs is currently resolved. \\nPatient states he went to Urgent Care 1 week ago, the rash on his Left arm was swollen and itchy. Patient was given a 7 day course of Cephalexin and Triamcinolone Ointment. Patient states the rash improved but is now on his right arm and hand.

## 2019-11-27 ENCOUNTER — OFFICE VISIT (OUTPATIENT)
Dept: URGENT CARE | Facility: CLINIC | Age: 84
End: 2019-11-27
Payer: MEDICARE

## 2019-11-27 VITALS
HEART RATE: 58 BPM | DIASTOLIC BLOOD PRESSURE: 70 MMHG | SYSTOLIC BLOOD PRESSURE: 114 MMHG | WEIGHT: 154.2 LBS | OXYGEN SATURATION: 96 % | RESPIRATION RATE: 14 BRPM | HEIGHT: 61 IN | TEMPERATURE: 97.5 F | BODY MASS INDEX: 29.11 KG/M2

## 2019-11-27 DIAGNOSIS — L30.9 DERMATITIS: ICD-10-CM

## 2019-11-27 PROCEDURE — 99213 OFFICE O/P EST LOW 20 MIN: CPT | Performed by: PHYSICIAN ASSISTANT

## 2019-11-27 ASSESSMENT — ENCOUNTER SYMPTOMS
EYE DISCHARGE: 0
COUGH: 0
FEVER: 0
EYE REDNESS: 0
HEADACHES: 0
NAUSEA: 0
VOMITING: 0

## 2019-11-27 NOTE — PROGRESS NOTES
Subjective:      Chino Ennis is a 84 y.o. male who presents with Follow-Up (skin itchy, feeling better from the medication that was taken )        HPI    The patient presents to clinic for follow-up of dermatitis and cellulitis.  The patient was previously seen in clinic on 11/18.  The patient states he was diagnosed with dermatitis.  The patient states he was also diagnosed with cellulitis of his left hand likely secondary to itching.  The patient was prescribed a steroid cream and oral antibiotics.  The patient states his generalized itching has improved.  The patient states he saw Dermatology on Monday.  The patient states the Dermatologist refilled the steroid cream to use as needed for itching.  The patient states the swelling, tenderness, and redness to his left hand is now improved/resolved.  No fever.     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 He also has no past medical history of COPD or Migraine.  MEDS:   Current Outpatient Medications:   •  triamcinolone acetonide (KENALOG) 0.1 % Ointment, Apply 1 Application to affected area(s) 2 times a day., Disp: 1 Tube, Rfl: 0  •  triamcinolone acetonide (KENALOG) 0.1 % Cream, APPLY TOPICALLY TWICE A DAY TO RASH ON RIGHT MEDIAL KNEE UP TO 2 WEEKS/MONTH AS NEEDED, Disp: , Rfl: 0  •  mupirocin (BACTROBAN) 2 % Ointment, CLEAN AND APPLY TOPICALLY TO AFFECTED AREA BEHIND RIGHT EAR AND TO HEALING LESION LEFT FOREHEAD TWICE A DAY (CAN  SELF ADMINISTER), Disp: , Rfl: 0  •  furosemide (LASIX) 40 MG Tab, , Disp: , Rfl: 11  •  ASPIRIN LOW DOSE 81 MG EC tablet, , Disp: , Rfl: 11  •  aspirin (ASPIRIN LOW DOSE) 81 MG EC tablet, aspirin low dose 81 mg tbec, Disp: , Rfl:   •  chlorhexidine (PERIDEX) 0.12 % Solution, chlorhexidine gluconate 0.12 % soln, Disp: , Rfl:   •  diazePAM (VALIUM) 5 MG Tab, diazepam 5 mg tabs, Disp: , Rfl:   •  apixaban (ELIQUIS) 2.5mg Tab, eliquis 2.5 mg tabs, Disp: , Rfl:   •  MELATONIN ER PO, melatonin, Disp: , Rfl:   •  doxycycline (PERIOSTAT) 20 MG tablet, Take 20 mg by mouth 2 times a day. gingivitis, Disp: , Rfl:   •  famotidine (PEPCID) 20 MG Tab, Take 20 mg by mouth 2 times a day., Disp: , Rfl:   •  omeprazole (PRILOSEC OTC) 20 MG tablet, Take 40 mg by mouth 3 times a day before meals., Disp: , Rfl:   •  Mometasone Furo-Formoterol Fum (DULERA) 100-5 MCG/ACT Aerosol, Inhale 2 Puffs by mouth 2 Times a Day., Disp: , Rfl:   •  tramadol (ULTRAM) 50 MG Tab, Take 50 mg by mouth every four hours as needed., Disp: , Rfl:   •  rivaroxaban (XARELTO) 15 MG Tab tablet, Take 20 mg by mouth with dinner., Disp: , Rfl:   •  doxycycline (PERIOSTAT) 20 MG tablet, Take 20 mg by mouth 2 times a day., Disp: , Rfl:   •  vitamin D (CHOLECALCIFEROL) 1000 UNIT Tab, Take 1,000 Units by mouth every day., Disp: , Rfl:   •  Psyllium (METAMUCIL PO), Take 1 Each by mouth every 48 hours as needed., Disp: , Rfl:   •  acyclovir (ZOVIRAX) 800 MG Tab, Take 800 mg by mouth 2 times a day as needed (for coldsores)., Disp: , Rfl:   •  albuterol (VENTOLIN OR PROVENTIL) 108 (90 BASE) MCG/ACT Aero Soln inhalation aerosol, Inhale 2 Puffs by mouth every bedtime., Disp: , Rfl:   •  simvastatin (ZOCOR) 10 MG Tab, Take 10 mg by mouth every bedtime., Disp: , Rfl:   •  tamsulosin (FLOMAX) 0.4 MG capsule, Take 0.8 mg by mouth every bedtime., Disp: , Rfl:   •  clonazepam (KLONOPIN) 1 MG Tab, Take 2 mg by mouth every bedtime., Disp: , Rfl:   ALLERGIES:   Allergies   Allergen  "Reactions   • Other Environmental Unspecified     Grasses and weeds=asthma    • Lactose      SURGHX:   Past Surgical History:   Procedure Laterality Date   • CYSTOSCOPY N/A 3/7/2016    Procedure: CYSTOSCOPY;  Surgeon: Clarence Herring M.D.;  Location: SURGERY [facility];  Service:    • LASERTRIPSY N/A 3/7/2016    Procedure: LASERTRIPSY Litho of bladder stone;  Surgeon: Clarence Herring M.D.;  Location: SURGERY [facility];  Service:    • TRANS URETHRAL RESECTION PROSTATE N/A 3/7/2016    Procedure: TRANS URETHRAL RESECTION PROSTATE;  Surgeon: Clarence Herring M.D.;  Location: SURGERY [facility];  Service:    • CRANIOTOMY BRAIN LAB  2010    Brain tumor-meningioma   • ARTHROSCOPY, KNEE     • CRANIOTOMY     • DENTAL EXTRACTION(S)     • EYE SURGERY     • PROSTATECTOMY, RADICAL RETRO     • TONSILLECTOMY     • TRANS URETHRAL RESECTION PROSTATE     • VASECTOMY       SOCHX:  reports that he quit smoking about 21 years ago. His smoking use included cigarettes. He has a 45.00 pack-year smoking history. He has never used smokeless tobacco. He reports current alcohol use of about 1.0 oz of alcohol per week. He reports that he does not use drugs.  FH: Family history was reviewed, no pertinent findings to report      Review of Systems   Constitutional: Negative for fever.   HENT: Negative for congestion.    Eyes: Negative for discharge and redness.   Respiratory: Negative for cough.    Gastrointestinal: Negative for nausea and vomiting.   Skin: Negative for itching and rash.   Neurological: Negative for headaches.          Objective:     /70   Pulse (!) 58   Temp 36.4 °C (97.5 °F) (Temporal)   Resp 14   Ht 1.549 m (5' 1\")   Wt 69.9 kg (154 lb 3.2 oz)   SpO2 96%   BMI 29.14 kg/m²      Physical Exam  Constitutional:       General: He is not in acute distress.     Appearance: Normal appearance. He is not ill-appearing.   HENT:      Head: Normocephalic and atraumatic.      Right Ear: External ear normal.      Left " Ear: External ear normal.      Nose: Nose normal.   Eyes:      Extraocular Movements: Extraocular movements intact.      Conjunctiva/sclera: Conjunctivae normal.   Neck:      Musculoskeletal: Normal range of motion and neck supple.   Cardiovascular:      Rate and Rhythm: Normal rate and regular rhythm.      Heart sounds: Normal heart sounds.   Pulmonary:      Effort: Pulmonary effort is normal. No respiratory distress.      Breath sounds: Normal breath sounds. No wheezing.   Musculoskeletal: Normal range of motion.      Comments:   Left Hand:  No tenderness to palpation of the left hand.  No swelling.  No erythema.  No increased warmth.  No open wounds/lesions.  ROM intact  Neurovascular intact   Strength 5/5    Skin:     Comments:   No skin rashes or lesions.  No erythema.  No increased warmth.  No vesicles.  No pustules.  No drainage/weeping.   Neurological:      Mental Status: He is alert and oriented to person, place, and time.                 Assessment/Plan:     1. Dermatitis    The patient presents to clinic for follow-up of his dermatitis and secondary cellulitis.  The patient's symptoms are currently improved/resolved.  The patient states he was seen by Dermatology on Monday, and is scheduled to follow-up with Dermatology again in 1 month.  The patient states the dermatologist refilled the previously prescribed steroid cream and instructed the patient to use as needed for itchiness.  Based on the patient's presenting symptoms and physical exam findings, the patient's cellulitis to his left hand is now resolved.  Recommend the patient continue the steroid cream as prescribed.  Recommend the patient follow-up with his PCP.  Discussed return precautions with the patient, and he verbalized understanding.    Differential diagnoses, supportive care, and indications for immediate follow-up discussed with patient.   Instructed to return to clinic or nearest emergency department for any change in condition,  further concerns, or worsening of symptoms.    Continue Steroid Cream as prescribed   OTC Tylenol or Motrin for fever/discomfort.  OTC antihistamine for symptomatic relief  Follow-up with Dermatology as scheduled  Follow-up with PCP as needed  Return to clinic or go to the ED if symptoms worsen or fail to improve, or if patient develop worsening/increasing/persistent skin rashes, itching, pain/tenderness, swelling, increased redness or warmth to the affected areas, discharge/drainage, fever/chills, secondary signs of infection, and or any concerning symptoms.    Discussed plan with the patient, and he agrees to the above.

## 2019-12-17 ENCOUNTER — APPOINTMENT (RX ONLY)
Dept: URBAN - METROPOLITAN AREA CLINIC 20 | Facility: CLINIC | Age: 84
Setting detail: DERMATOLOGY
End: 2019-12-17

## 2019-12-17 DIAGNOSIS — L85.3 XEROSIS CUTIS: ICD-10-CM

## 2019-12-17 PROCEDURE — ? TREATMENT REGIMEN

## 2019-12-17 PROCEDURE — ? COUNSELING

## 2019-12-17 PROCEDURE — 99212 OFFICE O/P EST SF 10 MIN: CPT

## 2019-12-17 RX ORDER — TRIAMCINOLONE ACETONIDE 1 MG/G
OINTMENT TOPICAL
Qty: 1 | Refills: 11 | Status: ERX

## 2019-12-17 ASSESSMENT — LOCATION SIMPLE DESCRIPTION DERM: LOCATION SIMPLE: CHEST

## 2019-12-17 ASSESSMENT — LOCATION ZONE DERM: LOCATION ZONE: TRUNK

## 2019-12-17 ASSESSMENT — LOCATION DETAILED DESCRIPTION DERM: LOCATION DETAILED: STERNUM

## 2019-12-17 NOTE — PROCEDURE: COUNSELING
Patient Specific Counseling (Will Not Stick From Patient To Patient): Pt can self-administer triamcinolone cream BID to rashy areas.
Detail Level: Generalized

## 2019-12-19 DIAGNOSIS — Z01.810 PRE-OPERATIVE CARDIOVASCULAR EXAMINATION: ICD-10-CM

## 2019-12-19 DIAGNOSIS — Z01.812 PRE-OPERATIVE LABORATORY EXAMINATION: ICD-10-CM

## 2019-12-19 LAB
ANION GAP SERPL CALC-SCNC: 5 MMOL/L (ref 0–11.9)
APTT PPP: 39.2 SEC (ref 24.7–36)
BASOPHILS # BLD AUTO: 0.3 % (ref 0–1.8)
BASOPHILS # BLD: 0.03 K/UL (ref 0–0.12)
BUN SERPL-MCNC: 18 MG/DL (ref 8–22)
CALCIUM SERPL-MCNC: 9.9 MG/DL (ref 8.5–10.5)
CHLORIDE SERPL-SCNC: 106 MMOL/L (ref 96–112)
CO2 SERPL-SCNC: 32 MMOL/L (ref 20–33)
CREAT SERPL-MCNC: 1.33 MG/DL (ref 0.5–1.4)
EKG IMPRESSION: NORMAL
EOSINOPHIL # BLD AUTO: 0.25 K/UL (ref 0–0.51)
EOSINOPHIL NFR BLD: 2.8 % (ref 0–6.9)
ERYTHROCYTE [DISTWIDTH] IN BLOOD BY AUTOMATED COUNT: 45.4 FL (ref 35.9–50)
GLUCOSE SERPL-MCNC: 112 MG/DL (ref 65–99)
HCT VFR BLD AUTO: 39.7 % (ref 42–52)
HGB BLD-MCNC: 13.4 G/DL (ref 14–18)
IMM GRANULOCYTES # BLD AUTO: 0.03 K/UL (ref 0–0.11)
IMM GRANULOCYTES NFR BLD AUTO: 0.3 % (ref 0–0.9)
INR PPP: 1.21 (ref 0.87–1.13)
LYMPHOCYTES # BLD AUTO: 1.03 K/UL (ref 1–4.8)
LYMPHOCYTES NFR BLD: 11.7 % (ref 22–41)
MCH RBC QN AUTO: 31.6 PG (ref 27–33)
MCHC RBC AUTO-ENTMCNC: 33.8 G/DL (ref 33.7–35.3)
MCV RBC AUTO: 93.6 FL (ref 81.4–97.8)
MONOCYTES # BLD AUTO: 0.55 K/UL (ref 0–0.85)
MONOCYTES NFR BLD AUTO: 6.3 % (ref 0–13.4)
NEUTROPHILS # BLD AUTO: 6.89 K/UL (ref 1.82–7.42)
NEUTROPHILS NFR BLD: 78.6 % (ref 44–72)
NRBC # BLD AUTO: 0 K/UL
NRBC BLD-RTO: 0 /100 WBC
PLATELET # BLD AUTO: 239 K/UL (ref 164–446)
PMV BLD AUTO: 10.3 FL (ref 9–12.9)
POTASSIUM SERPL-SCNC: 3.9 MMOL/L (ref 3.6–5.5)
PROTHROMBIN TIME: 15.6 SEC (ref 12–14.6)
RBC # BLD AUTO: 4.24 M/UL (ref 4.7–6.1)
SODIUM SERPL-SCNC: 143 MMOL/L (ref 135–145)
WBC # BLD AUTO: 8.8 K/UL (ref 4.8–10.8)

## 2019-12-19 PROCEDURE — 93005 ELECTROCARDIOGRAM TRACING: CPT

## 2019-12-19 PROCEDURE — 36415 COLL VENOUS BLD VENIPUNCTURE: CPT

## 2019-12-19 PROCEDURE — 85730 THROMBOPLASTIN TIME PARTIAL: CPT

## 2019-12-19 PROCEDURE — 93010 ELECTROCARDIOGRAM REPORT: CPT | Performed by: INTERNAL MEDICINE

## 2019-12-19 PROCEDURE — 80048 BASIC METABOLIC PNL TOTAL CA: CPT

## 2019-12-19 PROCEDURE — 85610 PROTHROMBIN TIME: CPT

## 2019-12-19 PROCEDURE — 85025 COMPLETE CBC W/AUTO DIFF WBC: CPT

## 2019-12-19 SDOH — HEALTH STABILITY: MENTAL HEALTH: HOW OFTEN DO YOU HAVE A DRINK CONTAINING ALCOHOL?: MONTHLY OR LESS

## 2019-12-23 ENCOUNTER — ANESTHESIA (OUTPATIENT)
Dept: SURGERY | Facility: MEDICAL CENTER | Age: 84
End: 2019-12-23
Payer: MEDICARE

## 2019-12-23 ENCOUNTER — ANESTHESIA EVENT (OUTPATIENT)
Dept: SURGERY | Facility: MEDICAL CENTER | Age: 84
End: 2019-12-23
Payer: MEDICARE

## 2019-12-23 ENCOUNTER — HOSPITAL ENCOUNTER (OUTPATIENT)
Facility: MEDICAL CENTER | Age: 84
End: 2019-12-23
Attending: NEUROLOGICAL SURGERY | Admitting: NEUROLOGICAL SURGERY
Payer: MEDICARE

## 2019-12-23 VITALS
SYSTOLIC BLOOD PRESSURE: 102 MMHG | TEMPERATURE: 97.6 F | WEIGHT: 149.25 LBS | DIASTOLIC BLOOD PRESSURE: 56 MMHG | HEART RATE: 62 BPM | HEIGHT: 60 IN | BODY MASS INDEX: 29.3 KG/M2 | RESPIRATION RATE: 16 BRPM | OXYGEN SATURATION: 93 %

## 2019-12-23 LAB
APTT PPP: 33.9 SEC (ref 24.7–36)
INR PPP: 1.06 (ref 0.87–1.13)
PROTHROMBIN TIME: 14 SEC (ref 12–14.6)

## 2019-12-23 PROCEDURE — 700105 HCHG RX REV CODE 258: Performed by: NEUROLOGICAL SURGERY

## 2019-12-23 PROCEDURE — 500075 HCHG BLADE, CLIPPER NEURO: Performed by: NEUROLOGICAL SURGERY

## 2019-12-23 PROCEDURE — 110454 HCHG SHELL REV 250: Performed by: NEUROLOGICAL SURGERY

## 2019-12-23 PROCEDURE — 500002 HCHG ADHESIVE, DERMABOND: Performed by: NEUROLOGICAL SURGERY

## 2019-12-23 PROCEDURE — 160035 HCHG PACU - 1ST 60 MINS PHASE I: Performed by: NEUROLOGICAL SURGERY

## 2019-12-23 PROCEDURE — 160002 HCHG RECOVERY MINUTES (STAT): Performed by: NEUROLOGICAL SURGERY

## 2019-12-23 PROCEDURE — 160029 HCHG SURGERY MINUTES - 1ST 30 MINS LEVEL 4: Performed by: NEUROLOGICAL SURGERY

## 2019-12-23 PROCEDURE — 501838 HCHG SUTURE GENERAL: Performed by: NEUROLOGICAL SURGERY

## 2019-12-23 PROCEDURE — 160041 HCHG SURGERY MINUTES - EA ADDL 1 MIN LEVEL 4: Performed by: NEUROLOGICAL SURGERY

## 2019-12-23 PROCEDURE — 160048 HCHG OR STATISTICAL LEVEL 1-5: Performed by: NEUROLOGICAL SURGERY

## 2019-12-23 PROCEDURE — 500889 HCHG PACK, NEURO: Performed by: NEUROLOGICAL SURGERY

## 2019-12-23 PROCEDURE — 160025 RECOVERY II MINUTES (STATS): Performed by: NEUROLOGICAL SURGERY

## 2019-12-23 PROCEDURE — 160009 HCHG ANES TIME/MIN: Performed by: NEUROLOGICAL SURGERY

## 2019-12-23 PROCEDURE — 85610 PROTHROMBIN TIME: CPT

## 2019-12-23 PROCEDURE — 160046 HCHG PACU - 1ST 60 MINS PHASE II: Performed by: NEUROLOGICAL SURGERY

## 2019-12-23 PROCEDURE — 85730 THROMBOPLASTIN TIME PARTIAL: CPT

## 2019-12-23 PROCEDURE — 700111 HCHG RX REV CODE 636 W/ 250 OVERRIDE (IP)

## 2019-12-23 PROCEDURE — 700111 HCHG RX REV CODE 636 W/ 250 OVERRIDE (IP): Performed by: ANESTHESIOLOGY

## 2019-12-23 PROCEDURE — 700101 HCHG RX REV CODE 250: Performed by: NEUROLOGICAL SURGERY

## 2019-12-23 PROCEDURE — 500331 HCHG COTTONOID, SURG PATTIE: Performed by: NEUROLOGICAL SURGERY

## 2019-12-23 PROCEDURE — A6222 GAUZE <=16 IN NO W/SAL W/O B: HCPCS | Performed by: NEUROLOGICAL SURGERY

## 2019-12-23 RX ORDER — OXYCODONE HCL 5 MG/5 ML
5 SOLUTION, ORAL ORAL
Status: DISCONTINUED | OUTPATIENT
Start: 2019-12-23 | End: 2019-12-23 | Stop reason: HOSPADM

## 2019-12-23 RX ORDER — SODIUM CHLORIDE, SODIUM LACTATE, POTASSIUM CHLORIDE, CALCIUM CHLORIDE 600; 310; 30; 20 MG/100ML; MG/100ML; MG/100ML; MG/100ML
INJECTION, SOLUTION INTRAVENOUS CONTINUOUS
Status: DISCONTINUED | OUTPATIENT
Start: 2019-12-23 | End: 2019-12-23 | Stop reason: HOSPADM

## 2019-12-23 RX ORDER — ONDANSETRON 2 MG/ML
4 INJECTION INTRAMUSCULAR; INTRAVENOUS
Status: DISCONTINUED | OUTPATIENT
Start: 2019-12-23 | End: 2019-12-23 | Stop reason: HOSPADM

## 2019-12-23 RX ORDER — SODIUM CHLORIDE, SODIUM LACTATE, POTASSIUM CHLORIDE, CALCIUM CHLORIDE 600; 310; 30; 20 MG/100ML; MG/100ML; MG/100ML; MG/100ML
INJECTION, SOLUTION INTRAVENOUS CONTINUOUS
Status: DISCONTINUED | OUTPATIENT
Start: 2019-12-23 | End: 2019-12-24 | Stop reason: HOSPADM

## 2019-12-23 RX ORDER — METOPROLOL TARTRATE 1 MG/ML
1 INJECTION, SOLUTION INTRAVENOUS
Status: DISCONTINUED | OUTPATIENT
Start: 2019-12-23 | End: 2019-12-23 | Stop reason: HOSPADM

## 2019-12-23 RX ORDER — OXYCODONE HCL 5 MG/5 ML
10 SOLUTION, ORAL ORAL
Status: DISCONTINUED | OUTPATIENT
Start: 2019-12-23 | End: 2019-12-23 | Stop reason: HOSPADM

## 2019-12-23 RX ORDER — FINASTERIDE 5 MG/1
5 TABLET, FILM COATED ORAL DAILY
COMMUNITY
End: 2021-08-27 | Stop reason: SDUPTHER

## 2019-12-23 RX ORDER — FUROSEMIDE 40 MG/1
40 TABLET ORAL DAILY
COMMUNITY
End: 2021-06-17

## 2019-12-23 RX ORDER — LIDOCAINE HYDROCHLORIDE 10 MG/ML
INJECTION, SOLUTION EPIDURAL; INFILTRATION; INTRACAUDAL; PERINEURAL
Status: COMPLETED
Start: 2019-12-23 | End: 2019-12-23

## 2019-12-23 RX ORDER — HALOPERIDOL 5 MG/ML
1 INJECTION INTRAMUSCULAR
Status: DISCONTINUED | OUTPATIENT
Start: 2019-12-23 | End: 2019-12-23 | Stop reason: HOSPADM

## 2019-12-23 RX ORDER — BUPIVACAINE HYDROCHLORIDE AND EPINEPHRINE 5; 5 MG/ML; UG/ML
INJECTION, SOLUTION EPIDURAL; INTRACAUDAL; PERINEURAL
Status: DISCONTINUED | OUTPATIENT
Start: 2019-12-23 | End: 2019-12-23 | Stop reason: HOSPADM

## 2019-12-23 RX ORDER — HYDROMORPHONE HYDROCHLORIDE 1 MG/ML
0.4 INJECTION, SOLUTION INTRAMUSCULAR; INTRAVENOUS; SUBCUTANEOUS
Status: DISCONTINUED | OUTPATIENT
Start: 2019-12-23 | End: 2019-12-23 | Stop reason: HOSPADM

## 2019-12-23 RX ORDER — MEPERIDINE HYDROCHLORIDE 25 MG/ML
12.5 INJECTION INTRAMUSCULAR; INTRAVENOUS; SUBCUTANEOUS
Status: DISCONTINUED | OUTPATIENT
Start: 2019-12-23 | End: 2019-12-23 | Stop reason: HOSPADM

## 2019-12-23 RX ORDER — CEFAZOLIN SODIUM 1 G/3ML
INJECTION, POWDER, FOR SOLUTION INTRAMUSCULAR; INTRAVENOUS PRN
Status: DISCONTINUED | OUTPATIENT
Start: 2019-12-23 | End: 2019-12-23 | Stop reason: SURG

## 2019-12-23 RX ORDER — CHOLECALCIFEROL (VITAMIN D3) 125 MCG
5 CAPSULE ORAL
COMMUNITY
End: 2021-06-17

## 2019-12-23 RX ORDER — DOCUSATE SODIUM 100 MG/1
100 CAPSULE, LIQUID FILLED ORAL DAILY
COMMUNITY
End: 2021-05-28

## 2019-12-23 RX ORDER — HYDROMORPHONE HYDROCHLORIDE 1 MG/ML
0.6 INJECTION, SOLUTION INTRAMUSCULAR; INTRAVENOUS; SUBCUTANEOUS
Status: DISCONTINUED | OUTPATIENT
Start: 2019-12-23 | End: 2019-12-23 | Stop reason: HOSPADM

## 2019-12-23 RX ORDER — DIPHENHYDRAMINE HYDROCHLORIDE 50 MG/ML
12.5 INJECTION INTRAMUSCULAR; INTRAVENOUS
Status: DISCONTINUED | OUTPATIENT
Start: 2019-12-23 | End: 2019-12-23 | Stop reason: HOSPADM

## 2019-12-23 RX ORDER — ONDANSETRON 2 MG/ML
INJECTION INTRAMUSCULAR; INTRAVENOUS PRN
Status: DISCONTINUED | OUTPATIENT
Start: 2019-12-23 | End: 2019-12-23 | Stop reason: SURG

## 2019-12-23 RX ORDER — BACITRACIN 50000 [IU]/1
INJECTION, POWDER, FOR SOLUTION INTRAMUSCULAR
Status: DISCONTINUED | OUTPATIENT
Start: 2019-12-23 | End: 2019-12-23 | Stop reason: HOSPADM

## 2019-12-23 RX ORDER — TRIAMCINOLONE ACETONIDE 1 MG/G
1 OINTMENT TOPICAL PRN
COMMUNITY
End: 2021-05-28

## 2019-12-23 RX ORDER — HYDROMORPHONE HYDROCHLORIDE 1 MG/ML
0.2 INJECTION, SOLUTION INTRAMUSCULAR; INTRAVENOUS; SUBCUTANEOUS
Status: DISCONTINUED | OUTPATIENT
Start: 2019-12-23 | End: 2019-12-23 | Stop reason: HOSPADM

## 2019-12-23 RX ORDER — HYDRALAZINE HYDROCHLORIDE 20 MG/ML
5 INJECTION INTRAMUSCULAR; INTRAVENOUS
Status: DISCONTINUED | OUTPATIENT
Start: 2019-12-23 | End: 2019-12-23 | Stop reason: HOSPADM

## 2019-12-23 RX ORDER — ACETAMINOPHEN 325 MG/1
650 TABLET ORAL EVERY 4 HOURS PRN
COMMUNITY
End: 2021-05-28

## 2019-12-23 RX ADMIN — FENTANYL CITRATE 50 MCG: 50 INJECTION, SOLUTION INTRAMUSCULAR; INTRAVENOUS at 15:33

## 2019-12-23 RX ADMIN — CEFAZOLIN 1 G: 330 INJECTION, POWDER, FOR SOLUTION INTRAMUSCULAR; INTRAVENOUS at 15:31

## 2019-12-23 RX ADMIN — LIDOCAINE HYDROCHLORIDE 0.5 ML: 10 INJECTION, SOLUTION EPIDURAL; INFILTRATION; INTRACAUDAL at 14:39

## 2019-12-23 RX ADMIN — Medication 0.5 ML: at 14:39

## 2019-12-23 RX ADMIN — SODIUM CHLORIDE, POTASSIUM CHLORIDE, SODIUM LACTATE AND CALCIUM CHLORIDE: 600; 310; 30; 20 INJECTION, SOLUTION INTRAVENOUS at 14:39

## 2019-12-23 RX ADMIN — ONDANSETRON 4 MG: 2 INJECTION INTRAMUSCULAR; INTRAVENOUS at 15:53

## 2019-12-23 ASSESSMENT — PAIN SCALES - GENERAL: PAIN_LEVEL: 2

## 2019-12-23 NOTE — PROGRESS NOTES
Med rec updated and complete  Allergies reviewed  Pt had a MAR from [facility], pt reports that he did not take his ELIQUIS 2.5MG since 12/19/2019 he had been throwing this medications away.  Per MAR shows that pt has been taking his ELIQUIS today (12/23/2019) @ 0700  Pt reports no antibiotics in the last 2 weeks

## 2019-12-23 NOTE — ANESTHESIA PROCEDURE NOTES
Airway  Date/Time: 12/23/2019 3:35 PM  Performed by: Azam Courtney M.D.  Authorized by: Azam Courtney M.D.     Location:  OR  Urgency:  Elective  Indications for Airway Management:  Anesthesia  Spontaneous Ventilation: absent    Sedation Level:  Deep  Preoxygenated: Yes    Final Airway Type:  Supraglottic airway  Final Supraglottic Airway:  Standard LMA  SGA Size:  4  Number of Attempts at Approach:  1

## 2019-12-23 NOTE — ANESTHESIA PREPROCEDURE EVALUATION
Relevant Problems   PULMONARY   (+) Mild intermittent asthma without complication      NEURO   (+) History of DVT of lower extremity       Physical Exam    Airway   Mallampati: II  TM distance: >3 FB  Neck ROM: full       Cardiovascular - normal exam  Rhythm: regular  Rate: normal  (-) murmur     Dental - normal exam         Pulmonary - normal exam  Breath sounds clear to auscultation     Abdominal    Neurological - normal exam                 Anesthesia Plan    ASA 2       Plan - general       Airway plan will be LMA        Induction: intravenous    Postoperative Plan: Postoperative administration of opioids is intended.    Pertinent diagnostic labs and testing reviewed    Informed Consent:    Anesthetic plan and risks discussed with patient.    Use of blood products discussed with: patient whom consented to blood products.

## 2019-12-24 NOTE — ANESTHESIA POSTPROCEDURE EVALUATION
Patient: Chino Ennis    Procedure Summary     Date:  12/23/19 Room / Location:  [facility] 05 / SURGERY [facility]    Anesthesia Start:  1531 Anesthesia Stop:      Procedure:  CRANIOTOMY - FRONTAL SCREW REMOVAL (Left ) Diagnosis:  (DISORDER OF THE SKIN AND SUBCUTANEOUS TISSUE, UNSPECIFIED)    Surgeon:  Hayden Murrieta M.D. Responsible Provider:  Azam Courtney M.D.    Anesthesia Type:  general ASA Status:  2          Final Anesthesia Type: general  Last vitals  BP   Blood Pressure : 113/65    Temp   36.7 °C (98 °F)    Pulse   Pulse: 71   Resp   17    SpO2   93 %      Anesthesia Post Evaluation    Patient location during evaluation: PACU  Patient participation: complete - patient participated  Level of consciousness: awake and alert  Pain score: 2    Airway patency: patent  Anesthetic complications: no  Cardiovascular status: hemodynamically stable  Respiratory status: acceptable  Hydration status: euvolemic    PONV: none           Nurse Pain Score: 3 (NPRS)

## 2019-12-24 NOTE — PROGRESS NOTES
Received from recovery , pt AOx4, incision with sutures c/d/i to forehead, pt denies pain,  Tolerating po fluids, discharge instructions given to pt, pt to check with MD to restart eliquis, all questions answered  RTC access bus to  pt and return him to assisted living facility. Discharged to home

## 2019-12-24 NOTE — DISCHARGE INSTRUCTIONS
ACTIVITY: Rest and take it easy for the first 24 hours.  A responsible adult is recommended to remain with you during that time.  It is normal to feel sleepy.  We encourage you to not do anything that requires balance, judgment or coordination.    MILD FLU-LIKE SYMPTOMS ARE NORMAL. YOU MAY EXPERIENCE GENERALIZED MUSCLE ACHES, THROAT IRRITATION, HEADACHE AND/OR SOME NAUSEA.    FOR 24 HOURS DO NOT:  Drive, operate machinery or run household appliances.  Drink beer or alcoholic beverages.   Make important decisions or sign legal documents.    SPECIAL INSTRUCTIONS: follow your surgeon's instructions    DIET: To avoid nausea, slowly advance diet as tolerated, avoiding spicy or greasy foods for the first day.  Add more substantial food to your diet according to your physician's instructions.  Babies can be fed formula or breast milk as soon as they are hungry.  INCREASE FLUIDS AND FIBER TO AVOID CONSTIPATION.    SURGICAL DRESSING/BATHING: follow your surgeon's instructions    FOLLOW-UP APPOINTMENT:  A follow-up appointment should be arranged with your doctor in 1-2 weeks; call to schedule.    You should CALL YOUR PHYSICIAN if you develop:  Fever greater than 101 degrees F.  Pain not relieved by medication, or persistent nausea or vomiting.  Excessive bleeding (blood soaking through dressing) or unexpected drainage from the wound.  Extreme redness or swelling around the incision site, drainage of pus or foul smelling drainage.  Inability to urinate or empty your bladder within 8 hours.  Problems with breathing or chest pain.    You should call [phone] if you develop problems with breathing or chest pain.  If you are unable to contact your doctor or surgical center, you should go to the nearest emergency room or urgent care center.  Physician's telephone #: [phone]    If any questions arise, call your doctor.  If your doctor is not available, please feel free to call the Surgical Center at [phone].  The Center is  open Monday through Friday from 7AM to 7PM.  You can also call the HEALTH HOTLINE open 24 hours/day, 7 days/week and speak to a nurse at [phone], or toll free at [phone].    A registered nurse may call you a few days after your surgery to see how you are doing after your procedure.    MEDICATIONS: Resume taking daily medication.  Take prescribed pain medication with food.  If no medication is prescribed, you may take non-aspirin pain medication if needed.  PAIN MEDICATION CAN BE VERY CONSTIPATING.  Take a stool softener or laxative such as senokot, pericolace, or milk of magnesia if needed.  .    If your physician has prescribed pain medication that includes Acetaminophen (Tylenol), do not take additional Acetaminophen (Tylenol) while taking the prescribed medication.    Depression / Suicide Risk    As you are discharged from this [facility] facility, it is important to learn how to keep safe from harming yourself.    Recognize the warning signs:  · Abrupt changes in personality, positive or negative- including increase in energy   · Giving away possessions  · Change in eating patterns- significant weight changes-  positive or negative  · Change in sleeping patterns- unable to sleep or sleeping all the time   · Unwillingness or inability to communicate  · Depression  · Unusual sadness, discouragement and loneliness  · Talk of wanting to die  · Neglect of personal appearance   · Rebelliousness- reckless behavior  · Withdrawal from people/activities they love  · Confusion- inability to concentrate     If you or a loved one observes any of these behaviors or has concerns about self-harm, here's what you can do:  · Talk about it- your feelings and reasons for harming yourself  · Remove any means that you might use to hurt yourself (examples: pills, rope, extension cords, firearm)  · Get professional help from the community (Mental Health, Substance Abuse, psychological counseling)  · Do not be  alone:Call your Safe Contact- someone whom you trust who will be there for you.  · Call your local CRISIS HOTLINE 401-8198 or 177-216-7106  · Call your local Children's Mobile Crisis Response Team [place] (739) 548-9276 or www.[employer]  · Call the toll free National Suicide Prevention Hotlines   · National Suicide Prevention Lifeline 317-424-CMHV (9510)  · National [employer] Line Network 800-SUICIDE (401-4361)

## 2019-12-24 NOTE — ANESTHESIA QCDR
2019 [facility] Clinical Data Registry (for Quality Improvement)     Postoperative nausea/vomiting risk protocol (Adult = 18 yrs and Pediatric 3-17 yrs)- (430 and 463)  General inhalation anesthetic (NOT TIVA) with PONV risk factors: Yes  Provision of anti-emetic therapy with at least 2 different classes of agents: Yes   Patient DID NOT receive anti-emetic therapy and reason is documented in Medical Record:  N/A    Multimodal Pain Management- (AQI59)  Patient undergoing Elective Surgery (i.e. Outpatient, or ASC, or Prescheduled Surgery prior to Hospital Admission): Yes  Use of Multimodal Pain Management, two or more drugs and/or interventions, NOT including systemic opioids: Yes   Exception: Documented allergy to multiple classes of analgesics:  N/A    PACU assessment of acute postoperative pain prior to Anesthesia Care End- Applies to Patients Age = 18- (ABG7)  Initial PACU pain score is which of the following: < 7/10  Patient unable to report pain score: N/A    Post-anesthetic transfer of care checklist/protocol to PACU/ICU- (426 and 427)  Upon conclusion of case, patient transferred to which of the following locations: PACU/Non-ICU  Use of transfer checklist/protocol:   Exclusion: Service Performed in Patient Hospital Room (and thus did not require transfer):     PACU Reintubation- (AQI31)  General anesthesia requiring endotracheal intubation (ETT) along with subsequent extubation in OR or PACU: Yes  Required reintubation in the PACU: No   Extubation was a planned trial documented in the medical record prior to removal of the original airway device:  N/A    Unplanned admission to ICU related to anesthesia service up through end of PACU care- (MD51)  Unplanned admission to ICU (not initially anticipated at anesthesia start time): No

## 2019-12-24 NOTE — ANESTHESIA TIME REPORT
Anesthesia Start and Stop Event Times     Date Time Event    12/23/2019 1524 Ready for Procedure     1531 Anesthesia Start     1606 Anesthesia Stop        Responsible Staff  12/23/19    Name Role Begin End    Azam Courtney M.D. Anesth 1531 1606        Preop Diagnosis (Free Text):  Pre-op Diagnosis     DISORDER OF THE SKIN AND SUBCUTANEOUS TISSUE, UNSPECIFIED        Preop Diagnosis (Codes):    Post op Diagnosis  Status post craniectomy  screw removal head    Premium Reason  K. Alert    Comments: Premium Sherwin

## 2019-12-24 NOTE — OP REPORT
DATE OF SERVICE:  12/23/2019    PREOPERATIVE DIAGNOSIS:  Bifrontal craniectomy with a loose screw in the left   forehead.    POSTOPERATIVE DIAGNOSIS:  Bifrontal craniectomy with a loose screw in the left   forehead.    PROCEDURE:  Removal of left-sided frontal cranial screw.    SURGEON:  Debra Murrieta MD.    ASSISTANT:  None.    ANESTHESIA:  MAC.    COMPLICATIONS:  None.    ESTIMATED BLOOD LOSS:  Minimal.    DESCRIPTION OF PROCEDURE:  Patient was brought to the operating room and   identified in the usual fashion.  MAC anesthesia was induced by the anesthesia   team.  Patient was then placed supine on the operating table.  All pressure   points were meticulously padded.  We prepped and draped the patient in the   usual sterile fashion.  A 15 blade was used to make a transverse incision   along the forehead crease right at the level of the screw.  We then used a   minimal amount of Bovie electrocautery to expose the screw.  We then removed   it with a screwdriver.  Copious amounts of antibiotic irrigation were used to   wash out the wound.  We then closed the incision with a running 4-0 Monocryl   and topped with Dermabond.  All sponge and needle counts were correct x2 at   the end the case.  I was present and scrubbed for the entire procedure.    Patient was awakened and was transferred to the recovery room in stable   condition.       ____________________________________     DEBRA MURRIETA MD    CPD / NTS    DD:  12/23/2019 17:58:00  DT:  12/23/2019 19:40:01    D#:  [id number]  Job#:  [id number]

## 2021-01-14 DIAGNOSIS — Z23 NEED FOR VACCINATION: ICD-10-CM

## 2021-03-16 ENCOUNTER — HOSPITAL ENCOUNTER (OUTPATIENT)
Dept: LAB | Facility: MEDICAL CENTER | Age: 86
End: 2021-03-16
Attending: NURSE PRACTITIONER
Payer: MEDICARE

## 2021-03-16 LAB
25(OH)D3 SERPL-MCNC: 11 NG/ML (ref 30–100)
ALBUMIN SERPL BCP-MCNC: 3.8 G/DL (ref 3.2–4.9)
ALBUMIN/GLOB SERPL: 1.3 G/DL
ALP SERPL-CCNC: 80 U/L (ref 30–99)
ALT SERPL-CCNC: 14 U/L (ref 2–50)
ANION GAP SERPL CALC-SCNC: 5 MMOL/L (ref 7–16)
AST SERPL-CCNC: 19 U/L (ref 12–45)
BASOPHILS # BLD AUTO: 0.6 % (ref 0–1.8)
BASOPHILS # BLD: 0.04 K/UL (ref 0–0.12)
BILIRUB SERPL-MCNC: 0.8 MG/DL (ref 0.1–1.5)
BUN SERPL-MCNC: 22 MG/DL (ref 8–22)
CALCIUM SERPL-MCNC: 9.1 MG/DL (ref 8.5–10.5)
CHLORIDE SERPL-SCNC: 105 MMOL/L (ref 96–112)
CHOLEST SERPL-MCNC: 121 MG/DL (ref 100–199)
CO2 SERPL-SCNC: 32 MMOL/L (ref 20–33)
CREAT SERPL-MCNC: 1.24 MG/DL (ref 0.5–1.4)
EOSINOPHIL # BLD AUTO: 0.09 K/UL (ref 0–0.51)
EOSINOPHIL NFR BLD: 1.4 % (ref 0–6.9)
ERYTHROCYTE [DISTWIDTH] IN BLOOD BY AUTOMATED COUNT: 49.9 FL (ref 35.9–50)
EST. AVERAGE GLUCOSE BLD GHB EST-MCNC: 123 MG/DL
FOLATE SERPL-MCNC: 4 NG/ML
GLOBULIN SER CALC-MCNC: 2.9 G/DL (ref 1.9–3.5)
GLUCOSE SERPL-MCNC: 79 MG/DL (ref 65–99)
HBA1C MFR BLD: 5.9 % (ref 4–5.6)
HCT VFR BLD AUTO: 41.4 % (ref 42–52)
HDLC SERPL-MCNC: 49 MG/DL
HGB BLD-MCNC: 13.3 G/DL (ref 14–18)
IMM GRANULOCYTES # BLD AUTO: 0.01 K/UL (ref 0–0.11)
IMM GRANULOCYTES NFR BLD AUTO: 0.2 % (ref 0–0.9)
LDLC SERPL CALC-MCNC: 62 MG/DL
LYMPHOCYTES # BLD AUTO: 1.01 K/UL (ref 1–4.8)
LYMPHOCYTES NFR BLD: 15.9 % (ref 22–41)
MCH RBC QN AUTO: 29.3 PG (ref 27–33)
MCHC RBC AUTO-ENTMCNC: 32.1 G/DL (ref 33.7–35.3)
MCV RBC AUTO: 91.2 FL (ref 81.4–97.8)
MONOCYTES # BLD AUTO: 0.42 K/UL (ref 0–0.85)
MONOCYTES NFR BLD AUTO: 6.6 % (ref 0–13.4)
NEUTROPHILS # BLD AUTO: 4.8 K/UL (ref 1.82–7.42)
NEUTROPHILS NFR BLD: 75.3 % (ref 44–72)
NRBC # BLD AUTO: 0 K/UL
NRBC BLD-RTO: 0 /100 WBC
PLATELET # BLD AUTO: 203 K/UL (ref 164–446)
PMV BLD AUTO: 11.7 FL (ref 9–12.9)
POTASSIUM SERPL-SCNC: 3.9 MMOL/L (ref 3.6–5.5)
PROT SERPL-MCNC: 6.7 G/DL (ref 6–8.2)
PSA SERPL-MCNC: 6.28 NG/ML (ref 0–4)
RBC # BLD AUTO: 4.54 M/UL (ref 4.7–6.1)
SODIUM SERPL-SCNC: 142 MMOL/L (ref 135–145)
TRIGL SERPL-MCNC: 49 MG/DL (ref 0–149)
VIT B12 SERPL-MCNC: 549 PG/ML (ref 211–911)
WBC # BLD AUTO: 6.4 K/UL (ref 4.8–10.8)

## 2021-03-16 PROCEDURE — 85025 COMPLETE CBC W/AUTO DIFF WBC: CPT

## 2021-03-16 PROCEDURE — 36415 COLL VENOUS BLD VENIPUNCTURE: CPT

## 2021-03-16 PROCEDURE — 82746 ASSAY OF FOLIC ACID SERUM: CPT

## 2021-03-16 PROCEDURE — 84153 ASSAY OF PSA TOTAL: CPT

## 2021-03-16 PROCEDURE — 82306 VITAMIN D 25 HYDROXY: CPT

## 2021-03-16 PROCEDURE — 83036 HEMOGLOBIN GLYCOSYLATED A1C: CPT

## 2021-03-16 PROCEDURE — 82607 VITAMIN B-12: CPT

## 2021-03-16 PROCEDURE — 80053 COMPREHEN METABOLIC PANEL: CPT

## 2021-03-16 PROCEDURE — 80061 LIPID PANEL: CPT

## 2021-05-28 PROBLEM — L98.491 SKIN ULCER OF SCALP, LIMITED TO BREAKDOWN OF SKIN (HCC): Status: ACTIVE | Noted: 2021-05-28

## 2021-05-28 PROBLEM — I50.9 CHRONIC CONGESTIVE HEART FAILURE (HCC): Status: ACTIVE | Noted: 2021-05-28

## 2021-05-28 PROBLEM — I48.20 CHRONIC ATRIAL FIBRILLATION (HCC): Status: ACTIVE | Noted: 2021-05-28

## 2021-05-28 PROBLEM — E55.9 VITAMIN D DEFICIENCY: Status: ACTIVE | Noted: 2021-05-28

## 2021-05-28 PROBLEM — I10 ESSENTIAL HYPERTENSION: Status: ACTIVE | Noted: 2021-05-28

## 2021-07-10 PROBLEM — Z66 DNR (DO NOT RESUSCITATE): Status: ACTIVE | Noted: 2021-05-28

## 2021-07-10 PROBLEM — R97.20 ELEVATED PSA, LESS THAN 10 NG/ML: Status: ACTIVE | Noted: 2021-05-28

## 2021-07-10 PROBLEM — F13.20: Status: ACTIVE | Noted: 2021-05-28

## 2021-07-10 PROBLEM — I50.9 CHRONIC CONGESTIVE HEART FAILURE (HCC): Chronic | Status: ACTIVE | Noted: 2021-05-28

## 2021-07-10 PROBLEM — D49.6 NEOPLASM OF BRAIN (HCC): Status: ACTIVE | Noted: 2020-03-23

## 2021-07-10 PROBLEM — I10 ESSENTIAL HYPERTENSION: Chronic | Status: ACTIVE | Noted: 2021-05-28

## 2021-07-10 PROBLEM — D49.6 NEOPLASM OF BRAIN (HCC): Status: RESOLVED | Noted: 2020-03-23 | Resolved: 2021-07-10

## 2021-07-10 PROBLEM — L98.9 SCALP LESION: Status: ACTIVE | Noted: 2019-11-04

## 2021-07-10 PROBLEM — I48.20 CHRONIC ATRIAL FIBRILLATION (HCC): Chronic | Status: ACTIVE | Noted: 2021-05-28

## 2021-09-01 PROBLEM — F13.20: Chronic | Status: ACTIVE | Noted: 2021-05-28

## 2021-09-01 PROBLEM — F41.3 OTHER MIXED ANXIETY DISORDERS: Chronic | Status: ACTIVE | Noted: 2021-08-27

## 2021-09-01 PROBLEM — F41.3 OTHER MIXED ANXIETY DISORDERS: Status: ACTIVE | Noted: 2021-08-27

## 2021-11-06 PROBLEM — S41.111A SKIN TEAR OF RIGHT UPPER EXTREMITY: Status: ACTIVE | Noted: 2021-11-05

## 2021-11-06 PROBLEM — S41.111A SKIN TEAR OF RIGHT UPPER EXTREMITY: Chronic | Status: ACTIVE | Noted: 2021-11-05

## 2022-01-01 ENCOUNTER — PATIENT MESSAGE (OUTPATIENT)
Dept: HEALTH INFORMATION MANAGEMENT | Facility: OTHER | Age: 87
End: 2022-01-01

## 2022-02-13 PROBLEM — Z74.09 IMPAIRED FUNCTIONAL MOBILITY, BALANCE, GAIT, AND ENDURANCE: Status: ACTIVE | Noted: 2022-01-19

## 2022-02-13 PROBLEM — Z74.09 IMPAIRED FUNCTIONAL MOBILITY, BALANCE, GAIT, AND ENDURANCE: Chronic | Status: ACTIVE | Noted: 2022-01-19

## 2022-03-19 PROBLEM — L98.499 SKIN ULCER OF SCALP (HCC): Status: ACTIVE | Noted: 2022-02-18

## 2022-03-19 PROBLEM — L98.499 SKIN ULCER OF SCALP (HCC): Chronic | Status: ACTIVE | Noted: 2022-02-18

## 2022-05-30 PROBLEM — L89.329 DECUBITUS ULCER OF LEFT BUTTOCK: Status: ACTIVE | Noted: 2022-05-26

## 2022-06-30 ENCOUNTER — TELEPHONE (OUTPATIENT)
Dept: SCHEDULING | Facility: IMAGING CENTER | Age: 87
End: 2022-06-30

## 2022-07-14 ENCOUNTER — OFFICE VISIT (OUTPATIENT)
Dept: MEDICAL GROUP | Facility: MEDICAL CENTER | Age: 87
End: 2022-07-14
Payer: MEDICARE

## 2022-07-14 ENCOUNTER — HOSPITAL ENCOUNTER (OUTPATIENT)
Dept: LAB | Facility: MEDICAL CENTER | Age: 87
End: 2022-07-14
Attending: NURSE PRACTITIONER
Payer: MEDICARE

## 2022-07-14 VITALS
OXYGEN SATURATION: 95 % | SYSTOLIC BLOOD PRESSURE: 116 MMHG | WEIGHT: 138.8 LBS | HEIGHT: 61 IN | DIASTOLIC BLOOD PRESSURE: 60 MMHG | HEART RATE: 67 BPM | TEMPERATURE: 98 F | BODY MASS INDEX: 26.21 KG/M2

## 2022-07-14 DIAGNOSIS — E78.49 OTHER HYPERLIPIDEMIA: ICD-10-CM

## 2022-07-14 DIAGNOSIS — I50.9 CHRONIC CONGESTIVE HEART FAILURE, UNSPECIFIED HEART FAILURE TYPE (HCC): ICD-10-CM

## 2022-07-14 DIAGNOSIS — I10 ESSENTIAL HYPERTENSION: ICD-10-CM

## 2022-07-14 DIAGNOSIS — Z79.899 HIGH RISK MEDICATION USE: ICD-10-CM

## 2022-07-14 DIAGNOSIS — E55.9 VITAMIN D DEFICIENCY: ICD-10-CM

## 2022-07-14 DIAGNOSIS — L89.329 PRESSURE INJURY OF SKIN OF LEFT BUTTOCK, UNSPECIFIED INJURY STAGE: ICD-10-CM

## 2022-07-14 DIAGNOSIS — R97.20 ELEVATED PSA, LESS THAN 10 NG/ML: ICD-10-CM

## 2022-07-14 LAB
25(OH)D3 SERPL-MCNC: 32 NG/ML (ref 30–100)
APPEARANCE UR: CLEAR
BASOPHILS # BLD AUTO: 0.4 % (ref 0–1.8)
BASOPHILS # BLD: 0.03 K/UL (ref 0–0.12)
BILIRUB UR QL STRIP.AUTO: NEGATIVE
COLOR UR: YELLOW
EOSINOPHIL # BLD AUTO: 0.05 K/UL (ref 0–0.51)
EOSINOPHIL NFR BLD: 0.7 % (ref 0–6.9)
ERYTHROCYTE [DISTWIDTH] IN BLOOD BY AUTOMATED COUNT: 48.9 FL (ref 35.9–50)
EST. AVERAGE GLUCOSE BLD GHB EST-MCNC: 111 MG/DL
FOLATE SERPL-MCNC: 10.1 NG/ML
GLUCOSE UR STRIP.AUTO-MCNC: NEGATIVE MG/DL
HBA1C MFR BLD: 5.5 % (ref 4–5.6)
HCT VFR BLD AUTO: 42.2 % (ref 42–52)
HGB BLD-MCNC: 13.8 G/DL (ref 14–18)
IMM GRANULOCYTES # BLD AUTO: 0.02 K/UL (ref 0–0.11)
IMM GRANULOCYTES NFR BLD AUTO: 0.3 % (ref 0–0.9)
KETONES UR STRIP.AUTO-MCNC: NEGATIVE MG/DL
LEUKOCYTE ESTERASE UR QL STRIP.AUTO: NEGATIVE
LYMPHOCYTES # BLD AUTO: 1.07 K/UL (ref 1–4.8)
LYMPHOCYTES NFR BLD: 15.5 % (ref 22–41)
MCH RBC QN AUTO: 29.2 PG (ref 27–33)
MCHC RBC AUTO-ENTMCNC: 32.7 G/DL (ref 33.7–35.3)
MCV RBC AUTO: 89.2 FL (ref 81.4–97.8)
MICRO URNS: NORMAL
MONOCYTES # BLD AUTO: 0.46 K/UL (ref 0–0.85)
MONOCYTES NFR BLD AUTO: 6.6 % (ref 0–13.4)
NEUTROPHILS # BLD AUTO: 5.29 K/UL (ref 1.82–7.42)
NEUTROPHILS NFR BLD: 76.5 % (ref 44–72)
NITRITE UR QL STRIP.AUTO: NEGATIVE
NRBC # BLD AUTO: 0 K/UL
NRBC BLD-RTO: 0 /100 WBC
PH UR STRIP.AUTO: 6 [PH] (ref 5–8)
PLATELET # BLD AUTO: 231 K/UL (ref 164–446)
PMV BLD AUTO: 11.1 FL (ref 9–12.9)
PROT UR QL STRIP: NEGATIVE MG/DL
PSA SERPL-MCNC: 5.99 NG/ML (ref 0–4)
RBC # BLD AUTO: 4.73 M/UL (ref 4.7–6.1)
RBC UR QL AUTO: NEGATIVE
SP GR UR STRIP.AUTO: 1.02
TSH SERPL DL<=0.005 MIU/L-ACNC: 1.91 UIU/ML (ref 0.38–5.33)
UROBILINOGEN UR STRIP.AUTO-MCNC: 0.2 MG/DL
VIT B12 SERPL-MCNC: 548 PG/ML (ref 211–911)
WBC # BLD AUTO: 6.9 K/UL (ref 4.8–10.8)

## 2022-07-14 PROCEDURE — 84153 ASSAY OF PSA TOTAL: CPT

## 2022-07-14 PROCEDURE — 83036 HEMOGLOBIN GLYCOSYLATED A1C: CPT | Mod: GA

## 2022-07-14 PROCEDURE — 82607 VITAMIN B-12: CPT

## 2022-07-14 PROCEDURE — 36415 COLL VENOUS BLD VENIPUNCTURE: CPT

## 2022-07-14 PROCEDURE — 84443 ASSAY THYROID STIM HORMONE: CPT

## 2022-07-14 PROCEDURE — 82746 ASSAY OF FOLIC ACID SERUM: CPT

## 2022-07-14 PROCEDURE — 85025 COMPLETE CBC W/AUTO DIFF WBC: CPT

## 2022-07-14 PROCEDURE — 99213 OFFICE O/P EST LOW 20 MIN: CPT | Performed by: FAMILY MEDICINE

## 2022-07-14 PROCEDURE — 81003 URINALYSIS AUTO W/O SCOPE: CPT

## 2022-07-14 PROCEDURE — 82306 VITAMIN D 25 HYDROXY: CPT

## 2022-07-14 ASSESSMENT — ENCOUNTER SYMPTOMS
ABDOMINAL PAIN: 0
FEVER: 0
FALLS: 0
CHILLS: 0
HEADACHES: 0
SHORTNESS OF BREATH: 0
COUGH: 0

## 2022-07-14 ASSESSMENT — FIBROSIS 4 INDEX: FIB4 SCORE: 2.13

## 2022-07-14 NOTE — PROGRESS NOTES
Subjective:     CC:  The encounter diagnosis was Pressure injury of skin of left buttock, unspecified injury stage.    HISTORY OF THE PRESENT ILLNESS: Patient is a 87 y.o. male. This pleasant patient is here today to establish care and discuss sore on bottom.     Patient been dealing with a sore on his bottom.  He has been placing a Band-Aid over it to provide some cushion and protection.  He thinks has been getting better but if he sits for too long it hurts.  He is not have any drainage, no pain other than with sitting.  He has not had it looked at recently.    [place] a metal plate in his forehead.      Problem   Decubitus Ulcer of Left Buttock       Current Outpatient Medications Ordered in Epic   Medication Sig Dispense Refill   • acyclovir (ZOVIRAX) 400 MG tablet Take 1 Tablet by mouth 3 times a day. For herpes flare-up; may keep in his room for self-administer 21 Tablet 0   • cetirizine (ZYRTEC) 10 MG Tab Take 1 Tablet by mouth every day. 30 Tablet 11   • albuterol 108 (90 Base) MCG/ACT Aero Soln inhalation aerosol Inhale 2 Puffs every 6 hours as needed for Shortness of Breath. 8 g 1   • tamsulosin (FLOMAX) 0.4 MG capsule TAKE 1 CAPSULE BY MOUTH ONCE DAILY 28 Capsule 10   • hydrOXYzine HCl (ATARAX) 50 MG Tab Take 1 Tablet by mouth at bedtime. 30 Tablet 10   • melatonin 5 mg Tab Take 1 Tablet by mouth at bedtime. 30 Tablet 11   • simvastatin (ZOCOR) 10 MG Tab Take 1 Tablet by mouth every evening. 30 Tablet 11   • docusate sodium (STOOL SOFTENER) 100 MG Cap Take 2 Capsules by mouth every day. 60 Capsule 11   • chlorhexidine (PERIDEX) 0.12 % Solution Take 15 mL by mouth every day. 473 mL 1   • apixaban (ELIQUIS) 2.5mg Tab Take 1 Tablet by mouth 2 times a day. TAKE 1 TABLET BY MOUTH TWICE A DAY 30 Tablet 11   • furosemide (LASIX) 40 MG Tab Take 1 Tablet by mouth every day. 30 Tablet 11   • finasteride (PROSCAR) 5 MG Tab Take 1 Tablet by mouth every day. 30 Tablet 11   •  "acetaminophen (TYLENOL) 325 MG Tab Take 2 Tablets by mouth every 6 hours as needed for Mild Pain.       No current Epic-ordered facility-administered medications on file.       Health Maintenance: No vaccines today    ROS:   Review of Systems   Constitutional: Negative for chills and fever.   Respiratory: Negative for cough and shortness of breath.    Cardiovascular: Negative for chest pain.   Gastrointestinal: Negative for abdominal pain.   Genitourinary: Negative for urgency.   Musculoskeletal: Negative for falls.   Neurological: Negative for headaches.         Objective:       Exam: /60   Pulse 67   Temp 36.7 °C (98 °F) (Temporal)   Ht 1.549 m (5' 1\")   Wt 63 kg (138 lb 12.8 oz)   SpO2 95%  Body mass index is 26.23 kg/m².    Physical Exam  Vitals reviewed.   Constitutional:       General: He is not in acute distress.     Appearance: Normal appearance.   HENT:      Head: Normocephalic and atraumatic.   Cardiovascular:      Rate and Rhythm: Normal rate and regular rhythm.      Heart sounds: Normal heart sounds.   Pulmonary:      Effort: Pulmonary effort is normal.      Breath sounds: Normal breath sounds.   Skin:     General: Skin is warm and dry.      Findings: Erythema and lesion present.      Comments: There is a gerson sized superficial ulceration on the left medial lower cheek.  There is surrounding erythema that appears irritated from Band-Aid use.  No drainage, I do not feel a palpable mass or abscess.   Neurological:      Mental Status: He is alert. Mental status is at baseline.      Gait: Gait normal.   Psychiatric:         Mood and Affect: Mood normal.         Behavior: Behavior normal.           Assessment & Plan:   87 y.o. male with the following -    Problem List Items Addressed This Visit     Decubitus ulcer of left buttock     Discussed with patient low threshold to start antibiotics if there is signs or symptoms of infection.  However at this time I do think gets irritation from the " Band-Aid.  There is also some small skin tears from Band-Aid removal.  I provided him with some gauze and paper tape to try to do a makeshift bandage with that might be more gentle on his skin.                 I spent a total of 22 minutes with record review, exam, communication with the patient, communication with other providers, and documentation of this encounter.    Return in about 3 months (around 10/14/2022).    Please note that this dictation was created using voice recognition software. I have made every reasonable attempt to correct obvious errors, but I expect that there are errors of grammar and possibly content that I did not discover before finalizing the note.

## 2022-07-14 NOTE — ASSESSMENT & PLAN NOTE
Discussed with patient low threshold to start antibiotics if there is signs or symptoms of infection.  However at this time I do think gets irritation from the Band-Aid.  There is also some small skin tears from Band-Aid removal.  I provided him with some gauze and paper tape to try to do a makeshift bandage with that might be more gentle on his skin.

## 2022-07-27 ENCOUNTER — HOSPITAL ENCOUNTER (OUTPATIENT)
Dept: LAB | Facility: MEDICAL CENTER | Age: 87
End: 2022-07-27
Attending: NURSE PRACTITIONER
Payer: MEDICARE

## 2022-07-27 DIAGNOSIS — R97.20 ELEVATED PSA, LESS THAN 10 NG/ML: ICD-10-CM

## 2022-07-27 DIAGNOSIS — E78.49 OTHER HYPERLIPIDEMIA: ICD-10-CM

## 2022-07-27 DIAGNOSIS — E55.9 VITAMIN D DEFICIENCY: ICD-10-CM

## 2022-07-27 DIAGNOSIS — I10 ESSENTIAL HYPERTENSION: ICD-10-CM

## 2022-07-27 DIAGNOSIS — Z79.899 HIGH RISK MEDICATION USE: ICD-10-CM

## 2022-07-27 DIAGNOSIS — I50.9 CHRONIC CONGESTIVE HEART FAILURE, UNSPECIFIED HEART FAILURE TYPE (HCC): ICD-10-CM

## 2022-07-27 LAB
ALBUMIN SERPL BCP-MCNC: 3.7 G/DL (ref 3.2–4.9)
ALBUMIN/GLOB SERPL: 1.2 G/DL
ALP SERPL-CCNC: 80 U/L (ref 30–99)
ALT SERPL-CCNC: 21 U/L (ref 2–50)
ANION GAP SERPL CALC-SCNC: 8 MMOL/L (ref 7–16)
AST SERPL-CCNC: 22 U/L (ref 12–45)
BILIRUB SERPL-MCNC: 0.6 MG/DL (ref 0.1–1.5)
BUN SERPL-MCNC: 19 MG/DL (ref 8–22)
CALCIUM SERPL-MCNC: 9.1 MG/DL (ref 8.5–10.5)
CHLORIDE SERPL-SCNC: 102 MMOL/L (ref 96–112)
CHOLEST SERPL-MCNC: 127 MG/DL (ref 100–199)
CO2 SERPL-SCNC: 30 MMOL/L (ref 20–33)
CREAT SERPL-MCNC: 1.29 MG/DL (ref 0.5–1.4)
FASTING STATUS PATIENT QL REPORTED: NORMAL
GFR SERPLBLD CREATININE-BSD FMLA CKD-EPI: 54 ML/MIN/1.73 M 2
GLOBULIN SER CALC-MCNC: 3 G/DL (ref 1.9–3.5)
GLUCOSE SERPL-MCNC: 90 MG/DL (ref 65–99)
HDLC SERPL-MCNC: 49 MG/DL
LDLC SERPL CALC-MCNC: 69 MG/DL
POTASSIUM SERPL-SCNC: 4 MMOL/L (ref 3.6–5.5)
PROT SERPL-MCNC: 6.7 G/DL (ref 6–8.2)
SODIUM SERPL-SCNC: 140 MMOL/L (ref 135–145)
TRIGL SERPL-MCNC: 46 MG/DL (ref 0–149)

## 2022-07-27 PROCEDURE — 80053 COMPREHEN METABOLIC PANEL: CPT

## 2022-07-27 PROCEDURE — 80061 LIPID PANEL: CPT

## 2022-07-27 PROCEDURE — 36415 COLL VENOUS BLD VENIPUNCTURE: CPT

## 2022-07-27 RX ORDER — ALBUTEROL SULFATE 90 UG/1
AEROSOL, METERED RESPIRATORY (INHALATION)
Qty: 8.5 G | Refills: 11 | Status: SHIPPED | OUTPATIENT
Start: 2022-07-27 | End: 2022-07-27 | Stop reason: SDUPTHER

## 2022-07-27 RX ORDER — ALBUTEROL SULFATE 90 UG/1
2 AEROSOL, METERED RESPIRATORY (INHALATION) EVERY 6 HOURS PRN
Qty: 8.5 G | Refills: 11 | Status: SHIPPED | OUTPATIENT
Start: 2022-07-27 | End: 2023-01-01 | Stop reason: SDUPTHER

## 2022-09-26 ENCOUNTER — OFFICE VISIT (OUTPATIENT)
Dept: MEDICAL GROUP | Facility: MEDICAL CENTER | Age: 87
End: 2022-09-26
Payer: MEDICARE

## 2022-09-26 ENCOUNTER — HOSPITAL ENCOUNTER (OUTPATIENT)
Dept: LAB | Facility: MEDICAL CENTER | Age: 87
DRG: 908 | End: 2022-09-26
Attending: FAMILY MEDICINE
Payer: MEDICARE

## 2022-09-26 VITALS
WEIGHT: 137 LBS | HEART RATE: 73 BPM | BODY MASS INDEX: 25.86 KG/M2 | TEMPERATURE: 99.1 F | HEIGHT: 61 IN | SYSTOLIC BLOOD PRESSURE: 118 MMHG | DIASTOLIC BLOOD PRESSURE: 68 MMHG | OXYGEN SATURATION: 94 %

## 2022-09-26 DIAGNOSIS — L89.329 PRESSURE INJURY OF SKIN OF LEFT BUTTOCK, UNSPECIFIED INJURY STAGE: ICD-10-CM

## 2022-09-26 DIAGNOSIS — I10 ESSENTIAL HYPERTENSION: ICD-10-CM

## 2022-09-26 DIAGNOSIS — L98.9 SKIN LESION OF CHEEK: ICD-10-CM

## 2022-09-26 DIAGNOSIS — Z23 NEED FOR VACCINATION: ICD-10-CM

## 2022-09-26 LAB
ALBUMIN SERPL BCP-MCNC: 3.6 G/DL (ref 3.2–4.9)
ALBUMIN/GLOB SERPL: 1.1 G/DL
ALP SERPL-CCNC: 80 U/L (ref 30–99)
ALT SERPL-CCNC: 15 U/L (ref 2–50)
ANION GAP SERPL CALC-SCNC: 9 MMOL/L (ref 7–16)
AST SERPL-CCNC: 21 U/L (ref 12–45)
BILIRUB SERPL-MCNC: 0.6 MG/DL (ref 0.1–1.5)
BUN SERPL-MCNC: 22 MG/DL (ref 8–22)
CALCIUM SERPL-MCNC: 9 MG/DL (ref 8.5–10.5)
CHLORIDE SERPL-SCNC: 101 MMOL/L (ref 96–112)
CO2 SERPL-SCNC: 31 MMOL/L (ref 20–33)
CREAT SERPL-MCNC: 1.4 MG/DL (ref 0.5–1.4)
GFR SERPLBLD CREATININE-BSD FMLA CKD-EPI: 49 ML/MIN/1.73 M 2
GLOBULIN SER CALC-MCNC: 3.4 G/DL (ref 1.9–3.5)
GLUCOSE SERPL-MCNC: 90 MG/DL (ref 65–99)
POTASSIUM SERPL-SCNC: 3.7 MMOL/L (ref 3.6–5.5)
PROT SERPL-MCNC: 7 G/DL (ref 6–8.2)
SODIUM SERPL-SCNC: 141 MMOL/L (ref 135–145)

## 2022-09-26 PROCEDURE — 90662 IIV NO PRSV INCREASED AG IM: CPT | Performed by: FAMILY MEDICINE

## 2022-09-26 PROCEDURE — 99214 OFFICE O/P EST MOD 30 MIN: CPT | Mod: 25 | Performed by: FAMILY MEDICINE

## 2022-09-26 PROCEDURE — 80053 COMPREHEN METABOLIC PANEL: CPT

## 2022-09-26 PROCEDURE — 36415 COLL VENOUS BLD VENIPUNCTURE: CPT

## 2022-09-26 PROCEDURE — G0008 ADMIN INFLUENZA VIRUS VAC: HCPCS | Performed by: FAMILY MEDICINE

## 2022-09-26 ASSESSMENT — FIBROSIS 4 INDEX: FIB4 SCORE: 1.77

## 2022-09-26 NOTE — PROGRESS NOTES
"Subjective:     CC: \"skin lesion on face\"    HPI:   Chion presents today with:    Has growth on side of face  First showed up a couple of months ago  Started out about size of quarter, black and raised slightly  Did squeeze and got some expression out  No further bleeding  Seems to have scabbed over and is staying the same    Has appointment on November 15th for surgery on scalp  He had significant skin breakdown and tissue breakdown as complication to a screw for the plate overlying his forehead. He is a little \"gun shy\" about returning to dermatology at this time    He thinks the ulcer on his bottom has healed. Still putting bandage over it for protection.      Problem   Skin Lesion of Cheek   Decubitus Ulcer of Left Buttock   Essential Hypertension       Current Outpatient Medications Ordered in Epic   Medication Sig Dispense Refill    melatonin 5 mg Tab TAKE 1 TABLET BY MOUTH AT BEDTIME. 28 Tablet 10    simvastatin (ZOCOR) 10 MG Tab TAKE 1 TABLET BY MOUTH EVERY EVENING 28 Tablet 10    hydrOXYzine HCl (ATARAX) 50 MG Tab TAKE 1 TABLET BY MOUTH AT BEDTIME 28 Tablet 10    docusate sodium (COLACE) 100 MG Cap TAKE 2 CAPSULES BY MOUTH ONCE DAILY 56 Capsule 10    finasteride (PROSCAR) 5 MG Tab TAKE 1 TABLET BY MOUTH ONCE DAILY 28 Tablet 10    furosemide (LASIX) 40 MG Tab TAKE 1 TABLET BY MOUTH ONCE DAILY 28 Tablet 10    albuterol 108 (90 Base) MCG/ACT Aero Soln inhalation aerosol Inhale 2 Puffs every 6 hours as needed for Shortness of Breath. 8.5 g 11    ELIQUIS 2.5 MG Tab TAKE 1 TABLET BY MOUTH TWICE DAILY 60 Tablet 10    acyclovir (ZOVIRAX) 400 MG tablet Take 1 Tablet by mouth 3 times a day. For herpes flare-up; may keep in his room for self-administer 21 Tablet 0    cetirizine (ZYRTEC) 10 MG Tab Take 1 Tablet by mouth every day. 30 Tablet 11    tamsulosin (FLOMAX) 0.4 MG capsule TAKE 1 CAPSULE BY MOUTH ONCE DAILY 28 Capsule 10    chlorhexidine (PERIDEX) 0.12 % Solution Take 15 mL by mouth every day. 473 mL 1    " "acetaminophen (TYLENOL) 325 MG Tab Take 2 Tablets by mouth every 6 hours as needed for Mild Pain.       No current Epic-ordered facility-administered medications on file.       Health Maintenance: influenza vaccine today    ROS:  ROS see HPI    Objective:     Exam:  /68   Pulse 73   Temp 37.3 °C (99.1 °F) (Temporal)   Ht 1.549 m (5' 1\")   Wt 62.1 kg (137 lb)   SpO2 94%   BMI 25.89 kg/m²  Body mass index is 25.89 kg/m².    Physical Exam  Vitals reviewed.   Constitutional:       General: He is not in acute distress.     Appearance: Normal appearance.   HENT:      Head:      Comments: Patient has depression on anterior scalp with bandage covering it  Cardiovascular:      Rate and Rhythm: Normal rate and regular rhythm.      Heart sounds: Normal heart sounds.   Pulmonary:      Effort: Pulmonary effort is normal.      Breath sounds: Normal breath sounds.   Skin:     General: Skin is warm.      Findings: Lesion present.      Comments: Left cheek has a approximately nickel sized raised darkened lesion that appears to have some scab formation over part of it   Neurological:      Mental Status: He is alert. Mental status is at baseline.   Psychiatric:         Mood and Affect: Mood normal.         Behavior: Behavior normal.         Assessment & Plan:     87 y.o. male with the following -     Problem List Items Addressed This Visit       Essential hypertension (Chronic)     This is a chronic condition, controlled.  Blood pressure is at goal under 140/90 in the office today.  We will continue the current regimen.           Relevant Orders    Comp Metabolic Panel    Decubitus ulcer of left buttock     This is located on the inside of his left cheek.  It appears to have resolved there is just some now roughened healing skin but no signs of ulceration is closed.  We will continue to monitor.  Discussed keeping pressure off of it and not need to bandage it anymore.         Skin lesion of cheek     Patient with new skin " lesion on left cheek of face.  Has some concerning features for malignancy there is still no but does not seem to be healing very fast.  Instructed patient to not pick at it.  Patient not really excited about going back to dermatology given his scalp complications.  Discussed with him that this is important to get checked out and that we should not forget about it.  He would like to go get has had surgery done first and then reassess.  They might be able to look at it while he is in the hospital there.          Other Visit Diagnoses       Need for vaccination        Relevant Orders    INFLUENZA VACCINE, HIGH DOSE (65+ ONLY) (Completed)                Return in about 3 months (around 12/26/2022).    Please note that this dictation was created using voice recognition software. I have made every reasonable attempt to correct obvious errors, but I expect that there are errors of grammar and possibly content that I did not discover before finalizing the note.

## 2022-09-26 NOTE — ASSESSMENT & PLAN NOTE
Patient with new skin lesion on left cheek of face.  Has some concerning features for malignancy there is still no but does not seem to be healing very fast.  Instructed patient to not pick at it.  Patient not really excited about going back to dermatology given his scalp complications.  Discussed with him that this is important to get checked out and that we should not forget about it.  He would like to go get has had surgery done first and then reassess.  They might be able to look at it while he is in the hospital there.

## 2022-09-26 NOTE — ASSESSMENT & PLAN NOTE
This is located on the inside of his left cheek.  It appears to have resolved there is just some now roughened healing skin but no signs of ulceration is closed.  We will continue to monitor.  Discussed keeping pressure off of it and not need to bandage it anymore.

## 2022-09-27 ENCOUNTER — HOSPITAL ENCOUNTER (INPATIENT)
Facility: MEDICAL CENTER | Age: 87
LOS: 1 days | DRG: 908 | End: 2022-09-28
Attending: EMERGENCY MEDICINE | Admitting: INTERNAL MEDICINE
Payer: MEDICARE

## 2022-09-27 ENCOUNTER — APPOINTMENT (OUTPATIENT)
Dept: RADIOLOGY | Facility: MEDICAL CENTER | Age: 87
DRG: 908 | End: 2022-09-27
Attending: EMERGENCY MEDICINE
Payer: MEDICARE

## 2022-09-27 DIAGNOSIS — S09.90XA CLOSED HEAD INJURY, INITIAL ENCOUNTER: ICD-10-CM

## 2022-09-27 DIAGNOSIS — L08.9 WOUND INFECTION: Primary | ICD-10-CM

## 2022-09-27 DIAGNOSIS — T14.8XXA WOUND INFECTION: Primary | ICD-10-CM

## 2022-09-27 DIAGNOSIS — S01.81XA LACERATION OF FOREHEAD, INITIAL ENCOUNTER: ICD-10-CM

## 2022-09-27 LAB
BASOPHILS # BLD AUTO: 0.3 % (ref 0–1.8)
BASOPHILS # BLD: 0.02 K/UL (ref 0–0.12)
EKG IMPRESSION: NORMAL
EOSINOPHIL # BLD AUTO: 0.14 K/UL (ref 0–0.51)
EOSINOPHIL NFR BLD: 2.2 % (ref 0–6.9)
ERYTHROCYTE [DISTWIDTH] IN BLOOD BY AUTOMATED COUNT: 46.3 FL (ref 35.9–50)
HCT VFR BLD AUTO: 34.5 % (ref 42–52)
HGB BLD-MCNC: 11.6 G/DL (ref 14–18)
IMM GRANULOCYTES # BLD AUTO: 0.01 K/UL (ref 0–0.11)
IMM GRANULOCYTES NFR BLD AUTO: 0.2 % (ref 0–0.9)
LYMPHOCYTES # BLD AUTO: 1 K/UL (ref 1–4.8)
LYMPHOCYTES NFR BLD: 15.7 % (ref 22–41)
MCH RBC QN AUTO: 29.8 PG (ref 27–33)
MCHC RBC AUTO-ENTMCNC: 33.6 G/DL (ref 33.7–35.3)
MCV RBC AUTO: 88.7 FL (ref 81.4–97.8)
MONOCYTES # BLD AUTO: 0.6 K/UL (ref 0–0.85)
MONOCYTES NFR BLD AUTO: 9.4 % (ref 0–13.4)
NEUTROPHILS # BLD AUTO: 4.59 K/UL (ref 1.82–7.42)
NEUTROPHILS NFR BLD: 72.2 % (ref 44–72)
NRBC # BLD AUTO: 0 K/UL
NRBC BLD-RTO: 0 /100 WBC
PLATELET # BLD AUTO: 200 K/UL (ref 164–446)
PMV BLD AUTO: 10.3 FL (ref 9–12.9)
RBC # BLD AUTO: 3.89 M/UL (ref 4.7–6.1)
WBC # BLD AUTO: 6.4 K/UL (ref 4.8–10.8)

## 2022-09-27 PROCEDURE — 85652 RBC SED RATE AUTOMATED: CPT

## 2022-09-27 PROCEDURE — 93005 ELECTROCARDIOGRAM TRACING: CPT | Performed by: EMERGENCY MEDICINE

## 2022-09-27 PROCEDURE — 304217 HCHG IRRIGATION SYSTEM

## 2022-09-27 PROCEDURE — 304999 HCHG REPAIR-SIMPLE/INTERMED LEVEL 1

## 2022-09-27 PROCEDURE — 70450 CT HEAD/BRAIN W/O DYE: CPT

## 2022-09-27 PROCEDURE — 80053 COMPREHEN METABOLIC PANEL: CPT

## 2022-09-27 PROCEDURE — 303747 HCHG EXTRA SUTURE

## 2022-09-27 PROCEDURE — 86140 C-REACTIVE PROTEIN: CPT

## 2022-09-27 PROCEDURE — 87070 CULTURE OTHR SPECIMN AEROBIC: CPT

## 2022-09-27 PROCEDURE — 0JQ00ZZ REPAIR SCALP SUBCUTANEOUS TISSUE AND FASCIA, OPEN APPROACH: ICD-10-PCS | Performed by: EMERGENCY MEDICINE

## 2022-09-27 PROCEDURE — 73130 X-RAY EXAM OF HAND: CPT | Mod: LT

## 2022-09-27 PROCEDURE — 83605 ASSAY OF LACTIC ACID: CPT

## 2022-09-27 PROCEDURE — 87186 SC STD MICRODIL/AGAR DIL: CPT

## 2022-09-27 PROCEDURE — 87205 SMEAR GRAM STAIN: CPT

## 2022-09-27 PROCEDURE — 36415 COLL VENOUS BLD VENIPUNCTURE: CPT

## 2022-09-27 PROCEDURE — 99285 EMERGENCY DEPT VISIT HI MDM: CPT

## 2022-09-27 PROCEDURE — 700101 HCHG RX REV CODE 250: Performed by: EMERGENCY MEDICINE

## 2022-09-27 PROCEDURE — 94760 N-INVAS EAR/PLS OXIMETRY 1: CPT

## 2022-09-27 PROCEDURE — 85025 COMPLETE CBC W/AUTO DIFF WBC: CPT

## 2022-09-27 PROCEDURE — 87077 CULTURE AEROBIC IDENTIFY: CPT

## 2022-09-27 PROCEDURE — 87040 BLOOD CULTURE FOR BACTERIA: CPT

## 2022-09-27 RX ORDER — BUPIVACAINE HYDROCHLORIDE AND EPINEPHRINE 5; 5 MG/ML; UG/ML
20 INJECTION, SOLUTION EPIDURAL; INTRACAUDAL; PERINEURAL ONCE
Status: COMPLETED | OUTPATIENT
Start: 2022-09-27 | End: 2022-09-27

## 2022-09-27 RX ADMIN — BUPIVACAINE HYDROCHLORIDE AND EPINEPHRINE BITARTRATE 20 ML: 5; .005 INJECTION, SOLUTION EPIDURAL; INTRACAUDAL; PERINEURAL at 22:51

## 2022-09-27 ASSESSMENT — FIBROSIS 4 INDEX: FIB4 SCORE: 2

## 2022-09-28 ENCOUNTER — APPOINTMENT (OUTPATIENT)
Dept: RADIOLOGY | Facility: MEDICAL CENTER | Age: 87
DRG: 908 | End: 2022-09-28
Attending: HOSPITALIST
Payer: MEDICARE

## 2022-09-28 VITALS
DIASTOLIC BLOOD PRESSURE: 64 MMHG | TEMPERATURE: 97.6 F | BODY MASS INDEX: 25.86 KG/M2 | HEIGHT: 61 IN | OXYGEN SATURATION: 95 % | WEIGHT: 137 LBS | SYSTOLIC BLOOD PRESSURE: 125 MMHG | HEART RATE: 67 BPM | RESPIRATION RATE: 18 BRPM

## 2022-09-28 PROBLEM — S09.90XA CLOSED HEAD INJURY: Status: ACTIVE | Noted: 2022-09-28

## 2022-09-28 PROBLEM — T14.8XXA WOUND INFECTION: Status: ACTIVE | Noted: 2022-09-28

## 2022-09-28 PROBLEM — L08.9 WOUND INFECTION: Status: ACTIVE | Noted: 2022-09-28

## 2022-09-28 LAB
ALBUMIN SERPL BCP-MCNC: 3.2 G/DL (ref 3.2–4.9)
ALBUMIN/GLOB SERPL: 1.1 G/DL
ALP SERPL-CCNC: 68 U/L (ref 30–99)
ALT SERPL-CCNC: 9 U/L (ref 2–50)
ANION GAP SERPL CALC-SCNC: 7 MMOL/L (ref 7–16)
AST SERPL-CCNC: 15 U/L (ref 12–45)
BILIRUB SERPL-MCNC: 0.3 MG/DL (ref 0.1–1.5)
BUN SERPL-MCNC: 20 MG/DL (ref 8–22)
CALCIUM SERPL-MCNC: 8.5 MG/DL (ref 8.5–10.5)
CHLORIDE SERPL-SCNC: 102 MMOL/L (ref 96–112)
CO2 SERPL-SCNC: 31 MMOL/L (ref 20–33)
CREAT SERPL-MCNC: 1.34 MG/DL (ref 0.5–1.4)
CRP SERPL HS-MCNC: 1.88 MG/DL (ref 0–0.75)
ERYTHROCYTE [SEDIMENTATION RATE] IN BLOOD BY WESTERGREN METHOD: 21 MM/HOUR (ref 0–20)
FLUAV RNA SPEC QL NAA+PROBE: NEGATIVE
FLUBV RNA SPEC QL NAA+PROBE: NEGATIVE
GFR SERPLBLD CREATININE-BSD FMLA CKD-EPI: 52 ML/MIN/1.73 M 2
GLOBULIN SER CALC-MCNC: 3 G/DL (ref 1.9–3.5)
GLUCOSE SERPL-MCNC: 102 MG/DL (ref 65–99)
GRAM STN SPEC: NORMAL
LACTATE SERPL-SCNC: 1.5 MMOL/L (ref 0.5–2)
POTASSIUM SERPL-SCNC: 3.5 MMOL/L (ref 3.6–5.5)
PROT SERPL-MCNC: 6.2 G/DL (ref 6–8.2)
RSV RNA SPEC QL NAA+PROBE: NEGATIVE
SARS-COV-2 RNA RESP QL NAA+PROBE: NOTDETECTED
SIGNIFICANT IND 70042: NORMAL
SITE SITE: NORMAL
SODIUM SERPL-SCNC: 140 MMOL/L (ref 135–145)
SOURCE SOURCE: NORMAL
SPECIMEN SOURCE: NORMAL
VANCOMYCIN SERPL-MCNC: 9.1 UG/ML

## 2022-09-28 PROCEDURE — 700111 HCHG RX REV CODE 636 W/ 250 OVERRIDE (IP): Performed by: INTERNAL MEDICINE

## 2022-09-28 PROCEDURE — 700117 HCHG RX CONTRAST REV CODE 255: Performed by: HOSPITALIST

## 2022-09-28 PROCEDURE — 96368 THER/DIAG CONCURRENT INF: CPT

## 2022-09-28 PROCEDURE — 96367 TX/PROPH/DG ADDL SEQ IV INF: CPT

## 2022-09-28 PROCEDURE — 700111 HCHG RX REV CODE 636 W/ 250 OVERRIDE (IP): Performed by: EMERGENCY MEDICINE

## 2022-09-28 PROCEDURE — 96365 THER/PROPH/DIAG IV INF INIT: CPT

## 2022-09-28 PROCEDURE — 36415 COLL VENOUS BLD VENIPUNCTURE: CPT

## 2022-09-28 PROCEDURE — A9576 INJ PROHANCE MULTIPACK: HCPCS | Performed by: HOSPITALIST

## 2022-09-28 PROCEDURE — 80202 ASSAY OF VANCOMYCIN: CPT

## 2022-09-28 PROCEDURE — 700105 HCHG RX REV CODE 258: Performed by: INTERNAL MEDICINE

## 2022-09-28 PROCEDURE — 700102 HCHG RX REV CODE 250 W/ 637 OVERRIDE(OP): Performed by: INTERNAL MEDICINE

## 2022-09-28 PROCEDURE — 700101 HCHG RX REV CODE 250: Performed by: INTERNAL MEDICINE

## 2022-09-28 PROCEDURE — 96366 THER/PROPH/DIAG IV INF ADDON: CPT

## 2022-09-28 PROCEDURE — C9803 HOPD COVID-19 SPEC COLLECT: HCPCS | Performed by: HOSPITALIST

## 2022-09-28 PROCEDURE — 700105 HCHG RX REV CODE 258: Performed by: EMERGENCY MEDICINE

## 2022-09-28 PROCEDURE — 70553 MRI BRAIN STEM W/O & W/DYE: CPT

## 2022-09-28 PROCEDURE — 700101 HCHG RX REV CODE 250: Performed by: EMERGENCY MEDICINE

## 2022-09-28 PROCEDURE — 99236 HOSP IP/OBS SAME DATE HI 85: CPT | Mod: AI | Performed by: INTERNAL MEDICINE

## 2022-09-28 PROCEDURE — 0241U HCHG SARS-COV-2 COVID-19 NFCT DS RESP RNA 4 TRGT MIC: CPT

## 2022-09-28 PROCEDURE — 306637 HCHG MISC ORTHO ITEM RC 0274

## 2022-09-28 PROCEDURE — A9270 NON-COVERED ITEM OR SERVICE: HCPCS | Performed by: INTERNAL MEDICINE

## 2022-09-28 PROCEDURE — 700111 HCHG RX REV CODE 636 W/ 250 OVERRIDE (IP): Performed by: HOSPITALIST

## 2022-09-28 PROCEDURE — 700105 HCHG RX REV CODE 258: Performed by: HOSPITALIST

## 2022-09-28 RX ORDER — ONDANSETRON 2 MG/ML
4 INJECTION INTRAMUSCULAR; INTRAVENOUS EVERY 4 HOURS PRN
Status: DISCONTINUED | OUTPATIENT
Start: 2022-09-28 | End: 2022-09-29 | Stop reason: HOSPADM

## 2022-09-28 RX ORDER — SODIUM CHLORIDE AND POTASSIUM CHLORIDE 150; 900 MG/100ML; MG/100ML
INJECTION, SOLUTION INTRAVENOUS CONTINUOUS
Status: DISCONTINUED | OUTPATIENT
Start: 2022-09-28 | End: 2022-09-29 | Stop reason: HOSPADM

## 2022-09-28 RX ORDER — ALBUTEROL SULFATE 90 UG/1
2 AEROSOL, METERED RESPIRATORY (INHALATION)
Status: DISCONTINUED | OUTPATIENT
Start: 2022-09-28 | End: 2022-09-29 | Stop reason: HOSPADM

## 2022-09-28 RX ORDER — MORPHINE SULFATE 4 MG/ML
2 INJECTION INTRAVENOUS
Status: DISCONTINUED | OUTPATIENT
Start: 2022-09-28 | End: 2022-09-29 | Stop reason: HOSPADM

## 2022-09-28 RX ORDER — OXYCODONE HYDROCHLORIDE 5 MG/1
5 TABLET ORAL
Status: DISCONTINUED | OUTPATIENT
Start: 2022-09-28 | End: 2022-09-29 | Stop reason: HOSPADM

## 2022-09-28 RX ORDER — METRONIDAZOLE 500 MG/100ML
500 INJECTION, SOLUTION INTRAVENOUS EVERY 8 HOURS
Status: SHIPPED
Start: 2022-09-28 | End: 2023-01-01

## 2022-09-28 RX ORDER — FINASTERIDE 5 MG/1
5 TABLET, FILM COATED ORAL DAILY
Status: DISCONTINUED | OUTPATIENT
Start: 2022-09-28 | End: 2022-09-29 | Stop reason: HOSPADM

## 2022-09-28 RX ORDER — METRONIDAZOLE 500 MG/100ML
500 INJECTION, SOLUTION INTRAVENOUS EVERY 8 HOURS
Status: DISCONTINUED | OUTPATIENT
Start: 2022-09-28 | End: 2022-09-29 | Stop reason: HOSPADM

## 2022-09-28 RX ORDER — ONDANSETRON 4 MG/1
4 TABLET, ORALLY DISINTEGRATING ORAL EVERY 4 HOURS PRN
Status: DISCONTINUED | OUTPATIENT
Start: 2022-09-28 | End: 2022-09-29 | Stop reason: HOSPADM

## 2022-09-28 RX ORDER — METRONIDAZOLE 500 MG/100ML
500 INJECTION, SOLUTION INTRAVENOUS ONCE
Status: COMPLETED | OUTPATIENT
Start: 2022-09-28 | End: 2022-09-28

## 2022-09-28 RX ORDER — POLYETHYLENE GLYCOL 3350 17 G/17G
1 POWDER, FOR SOLUTION ORAL
Status: DISCONTINUED | OUTPATIENT
Start: 2022-09-28 | End: 2022-09-29 | Stop reason: HOSPADM

## 2022-09-28 RX ORDER — BISACODYL 10 MG
10 SUPPOSITORY, RECTAL RECTAL
Status: DISCONTINUED | OUTPATIENT
Start: 2022-09-28 | End: 2022-09-29 | Stop reason: HOSPADM

## 2022-09-28 RX ORDER — OXYCODONE HYDROCHLORIDE 5 MG/1
2.5 TABLET ORAL
Status: DISCONTINUED | OUTPATIENT
Start: 2022-09-28 | End: 2022-09-29 | Stop reason: HOSPADM

## 2022-09-28 RX ORDER — AMOXICILLIN 250 MG
2 CAPSULE ORAL 2 TIMES DAILY
Status: DISCONTINUED | OUTPATIENT
Start: 2022-09-28 | End: 2022-09-29 | Stop reason: HOSPADM

## 2022-09-28 RX ORDER — CHOLECALCIFEROL (VITAMIN D3) 125 MCG
5 CAPSULE ORAL
Status: DISCONTINUED | OUTPATIENT
Start: 2022-09-28 | End: 2022-09-29 | Stop reason: HOSPADM

## 2022-09-28 RX ORDER — TAMSULOSIN HYDROCHLORIDE 0.4 MG/1
0.4 CAPSULE ORAL DAILY
Status: DISCONTINUED | OUTPATIENT
Start: 2022-09-28 | End: 2022-09-29 | Stop reason: HOSPADM

## 2022-09-28 RX ORDER — ACETAMINOPHEN 325 MG/1
650 TABLET ORAL EVERY 6 HOURS PRN
Status: DISCONTINUED | OUTPATIENT
Start: 2022-09-28 | End: 2022-09-29 | Stop reason: HOSPADM

## 2022-09-28 RX ORDER — HYDROXYZINE HYDROCHLORIDE 25 MG/1
50 TABLET, FILM COATED ORAL
Status: DISCONTINUED | OUTPATIENT
Start: 2022-09-28 | End: 2022-09-29 | Stop reason: HOSPADM

## 2022-09-28 RX ORDER — LABETALOL HYDROCHLORIDE 5 MG/ML
10 INJECTION, SOLUTION INTRAVENOUS EVERY 4 HOURS PRN
Status: DISCONTINUED | OUTPATIENT
Start: 2022-09-28 | End: 2022-09-29 | Stop reason: HOSPADM

## 2022-09-28 RX ADMIN — HYDROXYZINE HYDROCHLORIDE 50 MG: 25 TABLET, FILM COATED ORAL at 21:20

## 2022-09-28 RX ADMIN — CEFEPIME 2 G: 2 INJECTION, POWDER, FOR SOLUTION INTRAVENOUS at 00:12

## 2022-09-28 RX ADMIN — GADOTERIDOL 15 ML: 279.3 INJECTION, SOLUTION INTRAVENOUS at 18:20

## 2022-09-28 RX ADMIN — CEFEPIME 2 G: 2 INJECTION, POWDER, FOR SOLUTION INTRAVENOUS at 13:32

## 2022-09-28 RX ADMIN — FINASTERIDE 5 MG: 5 TABLET, FILM COATED ORAL at 05:40

## 2022-09-28 RX ADMIN — VANCOMYCIN HYDROCHLORIDE 1500 MG: 500 INJECTION, POWDER, LYOPHILIZED, FOR SOLUTION INTRAVENOUS at 01:25

## 2022-09-28 RX ADMIN — METRONIDAZOLE 500 MG: 5 INJECTION, SOLUTION INTRAVENOUS at 05:40

## 2022-09-28 RX ADMIN — METRONIDAZOLE 500 MG: 5 INJECTION, SOLUTION INTRAVENOUS at 00:12

## 2022-09-28 RX ADMIN — TAMSULOSIN HYDROCHLORIDE 0.4 MG: 0.4 CAPSULE ORAL at 05:40

## 2022-09-28 RX ADMIN — POTASSIUM CHLORIDE AND SODIUM CHLORIDE: 900; 150 INJECTION, SOLUTION INTRAVENOUS at 02:40

## 2022-09-28 RX ADMIN — METRONIDAZOLE 500 MG: 5 INJECTION, SOLUTION INTRAVENOUS at 14:30

## 2022-09-28 RX ADMIN — VANCOMYCIN HYDROCHLORIDE 1250 MG: 500 INJECTION, POWDER, LYOPHILIZED, FOR SOLUTION INTRAVENOUS at 16:43

## 2022-09-28 RX ADMIN — METRONIDAZOLE 500 MG: 5 INJECTION, SOLUTION INTRAVENOUS at 21:22

## 2022-09-28 RX ADMIN — Medication 5 MG: at 21:20

## 2022-09-28 ASSESSMENT — ENCOUNTER SYMPTOMS
BRUISES/BLEEDS EASILY: 0
TREMORS: 0
SPUTUM PRODUCTION: 0
PALPITATIONS: 0
CHILLS: 0
FLANK PAIN: 0
NERVOUS/ANXIOUS: 0
SPEECH CHANGE: 0
BACK PAIN: 0
DOUBLE VISION: 0
HEADACHES: 0
COUGH: 0
PHOTOPHOBIA: 0
BLURRED VISION: 0
HEARTBURN: 0
WEIGHT LOSS: 0
FALLS: 1
FEVER: 0
NAUSEA: 0
ORTHOPNEA: 0
HALLUCINATIONS: 0
HEMOPTYSIS: 0
POLYDIPSIA: 0
FOCAL WEAKNESS: 0
NECK PAIN: 0
VOMITING: 0

## 2022-09-28 ASSESSMENT — LIFESTYLE VARIABLES: SUBSTANCE_ABUSE: 0

## 2022-09-28 NOTE — WOUND TEAM
[facility]  Inpatient Services  Initial Wound and Skin Care Evaluation    Admission Date: 2022     Last order of IP CONSULT TO WOUND CARE was found on 2022 from Hospital Encounter on 2022     HPI, PMH, SH: Reviewed    Past Surgical History:   Procedure Laterality Date    HARDWARE REMOVAL NEURO  2019    Procedure: LEFT FRONTAL SCREW REMOVAL;  Surgeon: Hayden Murrieta M.D.;  Location: SURGERY [facility];  Service: Neurosurgery    CYSTOSCOPY N/A 3/7/2016    Procedure: CYSTOSCOPY;  Surgeon: Clarence Herring M.D.;  Location: SURGERY [facility];  Service:     LASERTRIPSY N/A 3/7/2016    Procedure: LASERTRIPSY Litho of bladder stone;  Surgeon: Clarence Herring M.D.;  Location: SURGERY [facility];  Service:     TRANS URETHRAL RESECTION PROSTATE N/A 3/7/2016    Procedure: TRANS URETHRAL RESECTION PROSTATE;  Surgeon: Clarence Herring M.D.;  Location: SURGERY [facility];  Service:     CATARACT EXTRACTION WITH IOL      CRANIOTOMY BRAIN LAB      Brain tumor-meningioma    KNEE ARTHROSCOPY Left     ARTHROSCOPY, KNEE      CRANIOTOMY      DENTAL EXTRACTION(S)      EYE SURGERY      PROSTATECTOMY, RADICAL RETRO      TONSILLECTOMY      TRANS URETHRAL RESECTION PROSTATE      VASECTOMY       Social History     Tobacco Use    Smoking status: Former     Packs/day: 1.50     Years: 30.00     Pack years: 45.00     Types: Cigarettes     Quit date: 1998     Years since quittin.7    Smokeless tobacco: Never    Tobacco comments:     avoid all tobacco products   Substance Use Topics    Alcohol use: Yes     Alcohol/week: 1.0 oz     Types: 2 Shots of liquor per week     Chief Complaint   Patient presents with    Head Injury     BIB from independent living facility, pt had a GLF, +head injury. Laceration to forehead, pt has Metal grad mesh that is always exposed per patient, however laceration to the L side of it. -LOC, +thinners, eliquis. Patient has a history of a brain  tumor and had a metal grad mesh placed to forehead. Pt also c/o of L wrist pain. PIV in place.     GLF     Diagnosis: Wound infection [T14.8XXA, L08.9]    Unit where seen by Wound Team: [facility] 21/21 [facility]     WOUND CONSULT/FOLLOW UP RELATED TO:  Forehead     WOUND HISTORY:  Patient stated that he fell and created a wound years ago and it never healed up. Patient is going to [place] to have surgery in November.  Patient came to the emergency room after leaning over in his chair and he fell over and hit his head creating a laceration that require sutures.      WOUND ASSESSMENT/LDA  Wound 09/28/22 Full Thickness Wound Head (Active)   Wound Image    09/28/22 1500   Site Assessment Yellow;Red;[place] 09/28/22 1500   Periwound Assessment Red;Pink 09/28/22 1500   Margins Defined edges;Unattached edges 09/28/22 1500   Closure Adhesive bandage 09/28/22 1500   Drainage Amount Small 09/28/22 1500   Drainage Description Sanguineous 09/28/22 1500   Treatments Cleansed;Site care 09/28/22 1500   Wound Cleansing Normal Saline Irrigation 09/28/22 1500   Periwound Protectant Not Applicable 09/28/22 1500   Dressing Cleansing/Solutions Not Applicable 09/28/22 1500   Dressing Options Petrolatum Gauze (yellow);Absorbent Abdominal Pad 09/28/22 1500   Dressing Changed New 09/28/22 1500   Dressing Status Clean;Dry;Intact 09/28/22 1500   Dressing Change/Treatment Frequency Daily, and As Needed 09/28/22 1500   NEXT Dressing Change/Treatment Date 09/29/22 09/28/22 1500   NEXT Weekly Photo (Inpatient Only) 10/05/22 09/28/22 1500   Non-staged Wound Description Full thickness 09/28/22 1500   Shape rectangle 09/28/22 1500   Wound Odor None 09/28/22 1500   Pulses N/A 09/28/22 1500   Exposed Structures Mesh;Sutures;VENESSA 09/28/22 1500   WOUND NURSE ONLY - Time Spent with Patient (mins) 75 09/28/22 1500   Number of days: 0        Vascular:    HORTENCIA:   No results found.    Lab Values:    Lab Results   Component Value Date/Time    WBC 6.4 09/27/2022 11:29 PM     RBC 3.89 (L) 09/27/2022 11:29 PM    HEMOGLOBIN 11.6 (L) 09/27/2022 11:29 PM    HEMATOCRIT 34.5 (L) 09/27/2022 11:29 PM    CREACTPROT 1.88 (H) 09/27/2022 11:29 PM    SEDRATEWES 21 (H) 09/27/2022 11:29 PM    HBA1C 5.5 07/14/2022 09:36 AM        Culture Results show:  No results found for this or any previous visit (from the past 720 hour(s)).    Pain Level/Medicated:  patient had pain from removing dressing       INTERVENTIONS BY WOUND TEAM:  Chart and images reviewed. Discussed with bedside RN. All areas of concern (based on picture review, LDA review and discussion with bedside RN) have been thoroughly assessed. Documentation of areas based on significant findings. This RN in to assess patient. Performed standard wound care which includes appropriate positioning, dressing removal and non-selective debridement. Pictures and measurements obtained weekly if/when required.  Preparation for Dressing removal: Dressing soaked with n/a  Non-selectively Debrided with:  n/s and gauze.  Sharp debridement: n/a  Toya wound: Cleansed with normal saline, Prepped with n/a  Primary Dressing: xeroform yellow  Secondary (Outer) Dressing: ABD pad    Interdisciplinary consultation: Patient, Bedside RN ,     EVALUATION / RATIONALE FOR TREATMENT:  Most Recent Date:  9/28/22: Applied xeroform yellow, for antimicrobial properies and to prevent the gauze from sticking to his wound.  Patient prefers to have his forehead covered entirely, and the ABD covers it.  Spandage to secure it.  Patient is going back to his assisted living home and I ordered enough dressing supplies until he is able to go surgery.       Goals: Steady decrease in wound area and depth weekly.    WOUND TEAM PLAN OF CARE ([X] for frequency of wound follow up,): X  Nursing to follow dressing orders written for wound care. Contact wound team if area fails to progress, deteriorates or with any questions/concerns if something comes up before next scheduled follow up (See  below as to whether wound is following and frequency of wound follow up)  Dressing changes by wound team:                   Follow up 3 times weekly:                NPWT change 3 times weekly:     Follow up 1-2 times weekly:      Follow up Bi-Monthly:                   Follow up as needed:   X  Other (explain):     NURSING PLAN OF CARE ORDERS (X):  Dressing changes: See Dressing Care orders: X  Skin care: See Skin Care orders: X  RN Prevention Protocol: X  Rectal tube care: See Rectal Tube Care orders:   Other orders:    RSKIN:   CURRENTLY IN PLACE (X), APPLIED THIS VISIT (A), ORDERED (O):   Q shift Herminio:  X  Q shift pressure point assessments:  X    Surface/Positioning X  Pressure redistribution mattress    X        Low Airloss          ICU Low Airloss   Bariatric MARIAM     Waffle cushion        Waffle Overlay          Reposition q 2 hours      TAPs Turning system     Z Chandana Pillow     Offloading/Redistribution VENESSA  Sacral Mepilex (Silicone dressing)     Heel Mepilex (Silicone dressing)         Heel float boots (Prevalon boot)             Float Heels off Bed with Pillows           Respiratory Room air  Silicone O2 tubing         Gray Foam Ear protectors     Cannula fixation Device (Tender )          High flow offloading Clip    Elastic head band offloading device      Anchorfast                                                         Trach with Optifoam split foam             Containment/Moisture Prevention VENESSA    Rectal tube or BMS    Purwick/Condom Cath        Rai Catheter    Barrier wipes           Barrier paste       Antifungal tx      Interdry        Mobilization VENESSA      Up to chair        Ambulate      PT/OT      Nutrition PO      Dietician        Diabetes Education      PO     TF     TPN     NPO   # days     Other        Anticipated discharge plans: Back to assisted living   LTACH:        SNF/Rehab:                  Home Health Care:           Outpatient Wound Center:            Self/Family Care:         Other:                  Vac Discharge Needs: X  Not Applicable Pt not on a wound vac:       Regular Vac while inpatient, alternative dressing at DC:        Regular Vac in use and continued at DC:            Reg. Vac w/ Skin Sub/Biologic in use. Will need to be changed 2x wkly:      Veraflo Vac while inpatient, ok to transition to Regular Vac on Discharge:           Veraflo Vac while inpatient, will need to remain on Veraflo Vac upon discharge:

## 2022-09-28 NOTE — ASSESSMENT & PLAN NOTE
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  We will plan to consult with neurosurgery tomorrow a.m.  Will keep patient n.p.o. in case patient will require neurosurgical intervention.

## 2022-09-28 NOTE — PROGRESS NOTES
"Pharmacy Vancomycin Kinetics Note for 9/28/2022     87 y.o. male on Vancomycin day # 1     Vancomycin Indication (Two level/Trough based Dosing): CNS Infection (goal trough 18-22)    Provider specified end date: 10/02/22    Active Antibiotics (From admission, onward)      Ordered     Ordering Provider       Wed Sep 28, 2022 12:50 AM    09/28/22 0050  cefepime (Maxipime) 2 g in  mL IVPB  EVERY 12 HOURS         Luis Troy M.D.    09/28/22 0050  MD Alert...Vancomycin per Pharmacy  PHARMACY TO DOSE        Question:  Indication(s) for vancomycin?  Answer:  Skin and soft tissue infection    Luis Troy M.D.    09/28/22 0050  metroNIDAZOLE (Flagyl) IVPB 500 mg  EVERY 8 HOURS         Luis Troy M.D.       Wed Sep 28, 2022 12:02 AM    09/28/22 0002  vancomycin (VANCOCIN) 1,500 mg in  mL IVPB  ONCE         Marty Winkler          Dosing Weight: 62 kg (136 lb 11 oz)    Admission History: Admitted on 9/27/2022 for Wound infection [T14.8XXA, L08.9]  Pertinent history: 88 yo male with remote history of brain tumor resection with mesh placment presents after GLF. On exam skin has been completely eroded away and the mesh was completely exposed, is loose and there is underlying obvious brain that is exposed and concern for easily accessible infection to develop.  The area does have a foul odor and was swabbed with wound culture. Labs are unremarkable.    Allergies: Other environmental, Shellfish allergy, Strawberry, Pork allergy, Fish, and Lactose     Pertinent cultures to date:   Results       Procedure Component Value Units Date/Time    BLOOD CULTURE [[id number]] Collected: 09/27/22 2314    Order Status: Sent Specimen: Blood from Peripheral Updated: 09/28/22 0053    Narrative:      Per Hospital Policy: Only change Specimen Src: to \"Line\" if  specified by physician order.    CULTURE WOUND W/ GRAM STAIN [[id number]] Collected: 09/27/22 2345    Order Status: Sent Specimen: Wound from Exudate " "Updated: 22    BLOOD CULTURE [[id number]] Collected: 22    Order Status: Sent Specimen: Blood from Peripheral Updated: 22    Narrative:      Per Hospital Policy: Only change Specimen Src: to \"Line\" if  specified by physician order.          Labs: Estimated Creatinine Clearance: 29.8 mL/min (by C-G formula based on SCr of 1.34 mg/dL).  Recent Labs     22   WBC 6.4   NEUTSPOLYS 72.20*     Recent Labs     22  1450 22   BUN 22 20   CREATININE 1.40 1.34   ALBUMIN 3.6 3.2     No intake or output data in the 24 hours ending 22   /58   Pulse 71   Temp 36.4 °C (97.6 °F) (Temporal)   Resp 18   Ht 1.549 m (5' 1\")   Wt 62.1 kg (137 lb)   SpO2 94%  Temp (24hrs), Av.4 °C (97.6 °F), Min:36.4 °C (97.6 °F), Max:36.4 °C (97.6 °F)    List concerns for Vancomycin clearance: Age;Malnutrition/Low albumin;Other (CKD with CrCl ~30ml/min)    A/P:   -  Vancomycin dose: 1500mg loading dose then will check vanc level due to CKD with baseline CrCl ~30ml/min    -  Next vancomycin level(s): random vanc level  at 1330 (12 hrs post dose)    -  Comments: Monitor ID workup and determine if patient has been on antibiotics recently.    Naresh Russo, PharmD, BCPS    "

## 2022-09-28 NOTE — ED NOTES
Unable to obtain med rec at this time  On interviewing the patient he states that the [facility] handles all of his medications.  I made contact with them at [phone]  They stated they will fax the MAR over

## 2022-09-28 NOTE — ED PROVIDER NOTES
ED Provider Note    Scribed for Marty Winkler by Ed Vargas. 9/27/2022  10:41 PM    Primary care provider: Wilton Truong D.O.  Means of arrival: EMS  History obtained from: Patient  History limited by: None    CHIEF COMPLAINT  Chief Complaint   Patient presents with    Head Injury     BIB from independent living facility, pt had a GLF, +head injury. Laceration to forehead, pt has Metal grad mesh that is always exposed per patient, however laceration to the L side of it. -LOC, +thinners, eliquis. Patient has a history of a brain tumor and had a metal grad mesh placed to forehead. Pt also c/o of L wrist pain. PIV in place.     GLF     HPI  Chino Ennis is a 87 y.o. male who presents to the Emergency Department via EMS for evaluation after a head injury that occurred just prior to arrival. Per EMS, patient lives in an independent living facility. He was leaning to grab something and hit his head on a step stool, sustaining a laceration to his head. There was no loss of consciousness. Patient remembers the entire event. Patient states he has history of a brain tumor for which he underwent radiation therapy. He is waiting to get plastic surgery to the area where he hit his head. He also reports pain to his left hand fingers. Denies any other injuries. Denies any leg pain or abdominal pain. He notes having history of asthma. EMS notes patient is on Eliquis.    Quality: Forehead bleeding  Duration: onset earlier today  Severity: Mild  Associated sx: laceration/pain to head, left wrist pain    REVIEW OF SYSTEMS  As above, all other systems reviewed and are negative.   See HPI for further details.     PAST MEDICAL HISTORY   has a past medical history of Allergy, Anxiety, Arrhythmia, Arthritis, ASTHMA, Bladder calculi (3/7/2016), Blood clotting disorder (HCC) (2015, 1/2016), Bowel habit changes, CATARACT, Chronic gingivitis (2/17/2016), Cold (11/2019), Enlarged prostate, Headache(784.0), Heart burn, Heart  murmur, History of DVT of lower extremity (2015), History of resection of meningioma (2013), History of transurethral resection of prostate (2016), History of transurethral resection of prostate (2016), Hyperlipidemia, IBD (inflammatory bowel disease), Indigestion, Insomnia (2016), OSTEOPOROSIS, Recurrent genital herpes (2016), Recurrent genital herpes (2016), Renal disorder, Seizure (HCC) (2018), Urinary incontinence, and Urinary tract infection, site not specified.  SURGICAL HISTORY   has a past surgical history that includes trans urethral resection prostate; tonsillectomy; dental extraction(s); arthroscopy, knee; craniotomy brain lab (); vasectomy; prostatectomy, radical retro; eye surgery; craniotomy; cystoscopy (N/A, 3/7/2016); lasertripsy (N/A, 3/7/2016); trans urethral resection prostate (N/A, 3/7/2016); knee arthroscopy (Left, ); cataract extraction with iol (); and hardware removal neuro (2019).  SOCIAL HISTORY  Social History     Tobacco Use    Smoking status: Former     Packs/day: 1.50     Years: 30.00     Pack years: 45.00     Types: Cigarettes     Quit date: 1998     Years since quittin.7    Smokeless tobacco: Never    Tobacco comments:     avoid all tobacco products   Vaping Use    Vaping Use: Never used   Substance Use Topics    Alcohol use: Yes     Alcohol/week: 1.0 oz     Types: 2 Shots of liquor per week    Drug use: No      Social History     Substance and Sexual Activity   Drug Use No     FAMILY HISTORY  Family History   Problem Relation Age of Onset    Cancer Father         Lung    Heart Disease Father     Hypertension Father     Hyperlipidemia Father     Heart Disease Maternal Uncle     Hypertension Maternal Uncle     Hyperlipidemia Maternal Uncle     Stroke Maternal Uncle     Cancer Paternal Uncle         Leukemia     CURRENT MEDICATIONS  Home Medications       Reviewed by Susy Christianson R.N. (Registered Nurse) on  "09/27/22 at 2254  Med List Status: Not Addressed     Medication Last Dose Status   acetaminophen (TYLENOL) 325 MG Tab  Active   acyclovir (ZOVIRAX) 400 MG tablet  Active   albuterol 108 (90 Base) MCG/ACT Aero Soln inhalation aerosol  Active   cetirizine (ZYRTEC) 10 MG Tab  Active   chlorhexidine (PERIDEX) 0.12 % Solution  Active   docusate sodium (COLACE) 100 MG Cap  Active   ELIQUIS 2.5 MG Tab  Active   finasteride (PROSCAR) 5 MG Tab  Active   furosemide (LASIX) 40 MG Tab  Active   hydrOXYzine HCl (ATARAX) 50 MG Tab  Active   melatonin 5 mg Tab  Active   simvastatin (ZOCOR) 10 MG Tab  Active   tamsulosin (FLOMAX) 0.4 MG capsule  Active                  ALLERGIES  Allergies   Allergen Reactions    Other Environmental Unspecified     Grasses and weeds=asthma     Shellfish Allergy Hives    Strawberry Hives    Pork Allergy      Against pt Jain    Fish Unspecified     Bones get stuck in teeth    Lactose Diarrhea     indigestion       PHYSICAL EXAM    VITAL SIGNS:   Vitals:    09/27/22 2243 09/27/22 2251 09/27/22 2300   BP:  128/65 119/65   Pulse:  77 73   Resp:  20 20   Temp:  36.4 °C (97.6 °F)    TempSrc:  Temporal    SpO2:  93% 93%   Weight: 62.1 kg (137 lb)     Height: 1.549 m (5' 1\")       Vitals: My interpretation: normotensive, not tachycardic, afebrile, not hypoxic    Reinterpretation of vitals: Unchanged    Cardiac Monitor Interpretation: The cardiac monitor revealed normal Sinus Rhythm  interpreted by me. The cardiac monitor was ordered secondary to the patient's history of head trauma and to monitor for dysrhythmia and/or tachycardia.    PE:   Constitutional: Well developed, Well nourished, No acute distress, Non-toxic appearance.   HENT: Normocephalic, there is surgical removal of the mid frontal bone with exposed surgical mesh and obvious brain tissue underneath.  There is a foul odor coming from this region and concerns for possibility of purulent material.  There is a large laceration to the left of " the mesh that is gaping but hemostatic, Bilateral external ears normal, Oropharynx is clear mucous membranes are moist. No oral exudates or nasal discharge.   Eyes: Pupils are equal round and reactive, EOMI, Conjunctiva normal, No discharge.   Neck: Normal range of motion, No tenderness, Supple, No stridor. No meningismus.  Lymphatic: No lymphadenopathy noted.   Cardiovascular: Regular rate and rhythm without murmur rub or gallop.  Thorax & Lungs: Clear breath sounds bilaterally without wheezes, rhonchi or rales. There is no chest wall tenderness.   Abdomen: Soft non-tender non-distended. There is no rebound or guarding. No organomegaly is appreciated. Bowel sounds are normal.  Skin: Normal without rash.   Back: No CVA or spinal tenderness.   Extremities: Intact distal pulses, No edema, No tenderness, No cyanosis, No clubbing. Capillary refill is less than 2 seconds.  Musculoskeletal: Good range of motion in all major joints. No tenderness to palpation or major deformities noted.   Neurologic: Alert & oriented x 3, Normal motor function, Normal sensory function, No focal deficits noted. Reflexes are normal.  Psychiatric: Affect normal, Judgment normal, Mood normal. There is no suicidal ideation or patient reported hallucinations.     DIAGNOSTIC STUDIES / PROCEDURES    LABS  Results for orders placed or performed during the hospital encounter of 09/27/22   CBC WITH DIFFERENTIAL   Result Value Ref Range    WBC 6.4 4.8 - 10.8 K/uL    RBC 3.89 (L) 4.70 - 6.10 M/uL    Hemoglobin 11.6 (L) 14.0 - 18.0 g/dL    Hematocrit 34.5 (L) 42.0 - 52.0 %    MCV 88.7 81.4 - 97.8 fL    MCH 29.8 27.0 - 33.0 pg    MCHC 33.6 (L) 33.7 - 35.3 g/dL    RDW 46.3 35.9 - 50.0 fL    Platelet Count 200 164 - 446 K/uL    MPV 10.3 9.0 - 12.9 fL    Neutrophils-Polys 72.20 (H) 44.00 - 72.00 %    Lymphocytes 15.70 (L) 22.00 - 41.00 %    Monocytes 9.40 0.00 - 13.40 %    Eosinophils 2.20 0.00 - 6.90 %    Basophils 0.30 0.00 - 1.80 %    Immature  Granulocytes 0.20 0.00 - 0.90 %    Nucleated RBC 0.00 /100 WBC    Neutrophils (Absolute) 4.59 1.82 - 7.42 K/uL    Lymphs (Absolute) 1.00 1.00 - 4.80 K/uL    Monos (Absolute) 0.60 0.00 - 0.85 K/uL    Eos (Absolute) 0.14 0.00 - 0.51 K/uL    Baso (Absolute) 0.02 0.00 - 0.12 K/uL    Immature Granulocytes (abs) 0.01 0.00 - 0.11 K/uL    NRBC (Absolute) 0.00 K/uL   COMP METABOLIC PANEL   Result Value Ref Range    Sodium 140 135 - 145 mmol/L    Potassium 3.5 (L) 3.6 - 5.5 mmol/L    Chloride 102 96 - 112 mmol/L    Co2 31 20 - 33 mmol/L    Anion Gap 7.0 7.0 - 16.0    Glucose 102 (H) 65 - 99 mg/dL    Bun 20 8 - 22 mg/dL    Creatinine 1.34 0.50 - 1.40 mg/dL    Calcium 8.5 8.5 - 10.5 mg/dL    AST(SGOT) 15 12 - 45 U/L    ALT(SGPT) 9 2 - 50 U/L    Alkaline Phosphatase 68 30 - 99 U/L    Total Bilirubin 0.3 0.1 - 1.5 mg/dL    Albumin 3.2 3.2 - 4.9 g/dL    Total Protein 6.2 6.0 - 8.2 g/dL    Globulin 3.0 1.9 - 3.5 g/dL    A-G Ratio 1.1 g/dL   LACTIC ACID   Result Value Ref Range    Lactic Acid 1.5 0.5 - 2.0 mmol/L   CRP QUANTITIVE (NON-CARDIAC)   Result Value Ref Range    Stat C-Reactive Protein 1.88 (H) 0.00 - 0.75 mg/dL   ESTIMATED GFR   Result Value Ref Range    GFR (CKD-EPI) 52 (A) >60 mL/min/1.73 m 2   EKG (NOW)   Result Value Ref Range    Report       [facility] Emergency Dept.    Test Date:  2022  Pt Name:    EDUARDA PALOMINO                Department: ER  MRN:        [id number]                      Room:        21  Gender:     Male                         Technician: [id number]  :        [id number]                   Requested By:ARIELLE MALCOLM  Order #:    [id number]                    Reading MD: Arielle Malcolm    Measurements  Intervals                                Axis  Rate:       69                           P:          72  VT:         151                          QRS:        -77  QRSD:       130                          T:          74  QT:         435  QTc:        466    Interpretive  Statements  Sinus rhythm  Right bundle branch block  Anteroseptal infarct, age indeterminate  ST elevation, consider inferior injury  Compared to ECG 12/19/2019 14:45:47  Myocardial infarct finding now present  ST (T wave) deviation now present  Left anterior fascicular block no longer present  Electronically Signed  On 9- 23:43:17 PDT by Marty Winkler        All labs reviewed by me. Significant for no leukocytosis, mild anemia, normal electrolytes, normal renal function, normal liver enzymes, normal bilirubin, lactic acid normal, CRP normal    RADIOLOGY  DX-HAND 3+ LEFT   Final Result         1.  No acute traumatic bony injury.      CT-HEAD W/O   Final Result         1.  Anterior pneumocephalus, appears likely related to open scalp laceration, and should be considered open head injury   2.  Atherosclerosis.      These findings were discussed with the patient's clinician, Marty Winkler, on 9/27/2022 10:57 PM.           The radiologist's interpretation of all radiological studies have been reviewed by me.    Laceration Repair Procedure Note  Indication: Laceration to forehead, 2 layer closure  Procedure: The patient was placed in the appropriate position and anesthesia around the laceration was obtained by infiltration using 0.5% Bupivacaine with epinephrine. The area was then irrigated with normal saline and the skin adjacent to the wound was cleansed with Betadine. The laceration was closed with one subcutaneous 5-0 vicryl using interrupted sutures and 6 cutaneous 4-0 Ethilon using interrupted sutures. A total of 7 sutures were placed. The wound area was then dressed with a sterile dressing.    Total repaired wound length: 3.5 cm.     COURSE & MEDICAL DECISION MAKING  Nursing notes, VS, PMSFHx, labs, imaging, EKG reviewed in chart.    MDM: 10:41 PM Chino Ennis is a 87 y.o. male who presented with mechanical fall at home, patient was bending over to pick something up and fell the rest of after  losing his balance.  Did not have loss of consciousness or near syncope before the fall.  Struck his forehead, is on Eliquis for prior DVT.  Arrives as a traumatic brain injury alert and seen at the triage desk.  Sent for immediate CT of the head.  No neck tenderness, he has mild tenderness in the left wrist from fall on outstretched hand.  X-ray of the wrist is negative.  Of note, patient has a complicated neurosurgical history and has frontal bone resection with mesh placed in 2010 he states.  About 4 months ago one of the screws was pushing through the skin and became loose and was removed by neurosurgery.  After that sounds like the patient was fairly lost to follow-up but the skin has been completely eroded away and the mesh was completely exposed, is loose and there is underlying obvious brain that is exposed and concern for easily accessible infection to develop.  The area does have a foul odor and was swabbed with wound culture.  Labs were done to evaluate including CBC, CMP, lactic acid, CRP and ESR all which were normal, ESR still pending.  His EKG is unremarkable.  Considering the purulent surrounding material, malodorous smell, and open direct tract to the outside with brain exposure, there is concern for developing cerebritis and infection.  Discussed with pharmacy and patient started on broad-spectrum antibiotics of vancomycin, cefepime and Flagyl.  Patient be admitted to the hospitalist for continued IV antibiotics and monitoring with nonemergent neurosurgical evaluation in the a.m. evaluate further plan of care.  Patient updated and is amenable at this time.    FINAL IMPRESSION  1. Wound infection Acute   2. Closed head injury, initial encounter Acute   3. Laceration of forehead, initial encounter        Ed MONTANO (Maci), am scribing for, and in the presence of, Marty Winkler.    Electronically signed by: Ed Vargas (Maci), 9/27/2022    Marty MONTANO  personally performed the services described in this documentation, as scribed by Ed Vargas in my presence, and it is both accurate and complete.    The note accurately reflects work and decisions made by me.  Marty Winkler  9/28/2022  12:39 AM

## 2022-09-28 NOTE — ED NOTES
Report received from Susy trauma RN. Open head lac. VSS. AxO 4, GCS 15. ERP at bedside for lac repair. Care assumed.

## 2022-09-28 NOTE — ASSESSMENT & PLAN NOTE
Patient has pre-existing defect in frontal lobe covered with titanium mesh which is exposed to the outside world  CT head without contrast: Anterior pneumocephalus, appears likely related to open scalp laceration, and should be considered open head injury   this is related to chronic bone defect.  Patient denies headache, nausea, vision changes, focal weakness  Monitor clinically  PT OT evaluation

## 2022-09-28 NOTE — HOSPITAL COURSE
87-year-old male with past medical history of A. fib, right lower extremity DVT, hypertension, CHF, frontal lobe meningioma removal in 2010 with subsequent development of right frontal wound complicated with now exposed titanium mesh presenting after a ground-level fall resulting in head injury.  Patient is unsure if he is currently taking Eliquis.  Patient suffered left frontal laceration which was repaired by ERP.  Given malodorous discharge patient was started on broad-spectrum antibiotics.

## 2022-09-28 NOTE — ED NOTES
Covid swab obtained from right nare, well tolerated by PT. Sample labeled at bedside and sent to lab via station tube.

## 2022-09-28 NOTE — ED TRIAGE NOTES
"Chief Complaint   Patient presents with    Head Injury     BIB from independent living facility, pt had a GLF, +head injury. Laceration to forehead, pt has Metal grad mesh that is always exposed per patient, however laceration to the L side of it. -LOC, +thinners, eliquis. Patient has a history of a brain tumor and had a metal grad mesh placed to forehead. Pt also c/o of L wrist pain. PIV in place.     GLF     Patient BIB EMS to the charge desk for a TBI, ERP at bedside assessing patient. GCS 15. Patient to CT with Trauma RN.     /65   Pulse 77   Temp 36.4 °C (97.6 °F) (Temporal)   Resp 20   Ht 1.549 m (5' 1\")   Wt 62.1 kg (137 lb)   SpO2 93%   BMI 25.89 kg/m²    "

## 2022-09-28 NOTE — ED NOTES
Blood drawn by this RN and second set of blood cultures drawn by lab and sent. EKG complete by this RN.

## 2022-09-28 NOTE — ED NOTES
Report given to Arielle FREDERICK. Safety measures in place. Call light within reach. Care relinquished.

## 2022-09-28 NOTE — ASSESSMENT & PLAN NOTE
Patient was on Eliquis 2.5 mg twice daily, which is not a DVT treatment dose, but rather was ordered for A. Fib  We will repeat bilateral lower extremity ultrasound to evaluate for residual DVT to see if patient needs to be on anticoagulation

## 2022-09-28 NOTE — ED NOTES
Patient remains comfortable on hospital bed, no identifiable needs at this time. Equal chest rise and fall bilaterally, pt connected to cardiac monitor. Pending bed placement. Safety measures in place, call light within reach.

## 2022-09-28 NOTE — PROGRESS NOTES
Hospital Medicine Daily Progress Note    Date of Service  9/28/2022    Chief Complaint  Chino Ennis is a 87 y.o. male admitted 9/27/2022 with F    Hospital Course  87-year-old male with past medical history of A. fib, right lower extremity DVT, hypertension, CHF, frontal lobe meningioma removal in 2010 with subsequent development of right frontal wound complicated with now exposed titanium mesh presenting after a ground-level fall resulting in head injury.  Patient is unsure if he is currently taking Eliquis.  Patient suffered left frontal laceration which was repaired by ERP.  Given malodorous discharge patient was started on broad-spectrum antibiotics.    Interval Problem Update  Pt is very pleasant  States he has been having drainage for quite some time now  I have consulted ID  I consulted Dr. Murrieta who is familiar with patient's case however states there is nothing that can be offered here from plastics and neurosurg perspective. Pt is pending surgery at [facility] on Nov 15  I have contacted transfer center for potential transfer to [facility]- pt has been accepted to [facility] for transfer. I have ordered MRI brain with and without as requested    I saw and examined patient today. Patient admitted early am, see H and P for full details.            I have discussed this patient's plan of care and discharge plan at IDT rounds today with Case Management, Nursing, Nursing leadership, and other members of the IDT team.    Consultants/Specialty  infectious disease and neurosurgery    Code Status  DNAR/DNI    Disposition  Patient is not medically cleared for discharge.   Anticipate discharge to  TBD .  I have placed the appropriate orders for post-discharge needs.    Review of Systems  ROS     Physical Exam  Temp:  [36.4 °C (97.6 °F)] 36.4 °C (97.6 °F)  Pulse:  [61-79] 67  Resp:  [16-20] 16  BP: (110-146)/(58-66) 110/64  SpO2:  [91 %-96 %] 95 %    Physical Exam    Fluids  No intake or output data in the 24 hours ending 09/28/22  0903    Laboratory  Recent Labs     09/27/22  2329   WBC 6.4   RBC 3.89*   HEMOGLOBIN 11.6*   HEMATOCRIT 34.5*   MCV 88.7   MCH 29.8   MCHC 33.6*   RDW 46.3   PLATELETCT 200   MPV 10.3     Recent Labs     09/26/22  1450 09/27/22  2329   SODIUM 141 140   POTASSIUM 3.7 3.5*   CHLORIDE 101 102   CO2 31 31   GLUCOSE 90 102*   BUN 22 20   CREATININE 1.40 1.34   CALCIUM 9.0 8.5                   Imaging  DX-HAND 3+ LEFT   Final Result         1.  No acute traumatic bony injury.      CT-HEAD W/O   Final Result         1.  Anterior pneumocephalus, appears likely related to open scalp laceration, and should be considered open head injury   2.  Atherosclerosis.      These findings were discussed with the patient's clinician, Marty Winkler, on 9/27/2022 10:57 PM.         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  We will plan to consult with neurosurgery tomorrow a.m.  Will keep patient n.p.o. in case patient will require neurosurgical intervention.    Closed head injury  Assessment & Plan  Patient has pre-existing defect in frontal lobe covered with titanium mesh which is exposed to the outside world  CT head without contrast: Anterior pneumocephalus, appears likely related to open scalp laceration, and should be considered open head injury   this is related to chronic bone defect.  Patient denies headache, nausea, vision changes, focal weakness  Monitor clinically  PT OT evaluation    Impaired functional mobility, balance, gait, and endurance- (present on admission)  Assessment & Plan  PT OT evaluation    Chronic atrial fibrillation (HCC)- (present on admission)  Assessment &  Plan  Currently in sinus rhythm, heart rate 69  Will hold Eliquis.    Chronic congestive heart failure (HCC)- (present on admission)  Assessment & Plan  Currently not in exacerbation.  Will hold Lasix as we have made patient n.p.o.    Essential hypertension- (present on admission)  Assessment & Plan  Monitor  IV labetalol as needed    History of DVT of lower extremity- (present on admission)  Assessment & Plan  Patient was on Eliquis 2.5 mg twice daily, which is not a DVT treatment dose, but rather was ordered for A. Fib  We will repeat bilateral lower extremity ultrasound to evaluate for residual DVT to see if patient needs to be on anticoagulation      Mild intermittent asthma without complication- (present on admission)  Assessment & Plan  Stable  Albuterol as needed         VTE prophylaxis: SCDs/TEDs    I have performed a physical exam and reviewed and updated ROS and Plan today (9/28/2022). In review of yesterday's note (9/27/2022), there are no changes except as documented above.

## 2022-09-28 NOTE — ED NOTES
Patient remains comfortable on gurney, no identifiable needs at this time. Equal chest rise and fall bilaterally, pt connected to cardiac monitor. Pending bed placement. Safety measures in place, call light within reach.

## 2022-09-28 NOTE — H&P
"Hospital Medicine History & Physical Note    Date of Service  9/28/2022    Primary Care Physician  Wilton Truong D.O.        Code Status  DNAR/DNI per POLST form, confirmed with the patient     Chief Complaint  Chief Complaint   Patient presents with    Head Injury     BIB from independent living facility, pt had a GLF, +head injury. Laceration to forehead, pt has Metal grad mesh that is always exposed per patient, however laceration to the L side of it. -LOC, +thinners, eliquis. Patient has a history of a brain tumor and had a metal grad mesh placed to forehead. Pt also c/o of L wrist pain. PIV in place.     GLF       History of Presenting Illness  Chino Ennis is a 87 y.o. male who presented 9/27/2022 with  past medical history of frontal lobe meningioma removal in 2010 with subsequent development of right frontal wound, now with exposed titanium mesh, that started at after removal of left cranial screw in 2015 according to the patient.  He was followed by Dr. Murrieta locally until he was referred to [place] [facility].  He stated he has an appointment on November 15 for surgery in [place], for mesh removal and grafting.  He stated his forehead wound has been bleeding intermittently in the last months.  Topical antibiotics and Band-Aid was used.  He has Eliquis prescribed for history of right leg DVT several years ago, unclear if provoked.  To the  question if patient was taking Eliquis, he stated that he has been taking \"whole bunch of pills\".  He appears to be poor historian.    Other chronic medical problems per chart review include chronic A. fib, hypertension, chronic CHF, elevated PSA,, history of benzodiazepine dependence, impaired mobility, balance, gait, anxiety, insomnia    Patient lives in independent living facility.  He was leaning to grab something and hit his head on a stepstool, sustaining laceration to his head at the left and right anterior corners of the chronic wound with " exposed metal mesh.  Left-sided laceration was repaired by ERP.  Patient has minimal bleeding at the laceration site.  As the wound was eliciting foul-smelling odor, ERP started patient on broad-spectrum antibiotic for possible infected wound.  He requested admission of the patient for antibiotics and neurosurgery consult in a.m.  Wound culture obtained.  Patient at this time denies headache, nausea, vision changes.  CT head without contrast noted.  Patient also was evaluated with left hand CT due to complaints of some left wrist pain after the fall.    I discussed the plan of care with patient.    Review of Systems  Review of Systems   Constitutional:  Negative for chills, fever and weight loss.   HENT:  Negative for ear pain, hearing loss and tinnitus.    Eyes:  Negative for blurred vision, double vision and photophobia.   Respiratory:  Negative for cough, hemoptysis and sputum production.    Cardiovascular:  Negative for chest pain, palpitations and orthopnea.   Gastrointestinal:  Negative for heartburn, nausea and vomiting.   Genitourinary:  Negative for dysuria, flank pain, frequency and hematuria.   Musculoskeletal:  Positive for falls. Negative for back pain, joint pain and neck pain.        Head injury   Skin:  Negative for itching and rash.        Frontal scalp wound   Neurological:  Negative for tremors, speech change, focal weakness and headaches.   Endo/Heme/Allergies:  Negative for environmental allergies and polydipsia. Does not bruise/bleed easily.   Psychiatric/Behavioral:  Negative for hallucinations and substance abuse. The patient is not nervous/anxious.      Past Medical History   has a past medical history of Allergy, Anxiety, Arrhythmia, Arthritis, ASTHMA, Bladder calculi (3/7/2016), Blood clotting disorder (HCC) (2015, 1/2016), Bowel habit changes, CATARACT, Chronic gingivitis (2/17/2016), Cold (11/2019), Enlarged prostate, Headache(784.0), Heart burn, Heart murmur, History of DVT of lower  extremity (12/23/2015), History of resection of meningioma (4/17/2013), History of transurethral resection of prostate (2/17/2016), History of transurethral resection of prostate (2/17/2016), Hyperlipidemia, IBD (inflammatory bowel disease), Indigestion, Insomnia (2/17/2016), OSTEOPOROSIS, 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    Surgical History   has a past surgical history that includes trans urethral resection prostate; tonsillectomy; dental extraction(s); arthroscopy, knee; craniotomy brain lab (2010); vasectomy; prostatectomy, radical retro; eye surgery; craniotomy; cystoscopy (N/A, 3/7/2016); lasertripsy (N/A, 3/7/2016); trans urethral resection prostate (N/A, 3/7/2016); knee arthroscopy (Left, 1975); cataract extraction with iol (2013); and hardware removal neuro (12/23/2019).     Family History  family history includes Cancer in his father and paternal uncle; Heart Disease in his father and maternal uncle; Hyperlipidemia in his father and maternal uncle; Hypertension in his father and maternal uncle; Stroke in his maternal uncle.   Family history reviewed with patient. There is no family history that is pertinent to the chief complaint.     Social History   reports that he quit smoking about 24 years ago. His smoking use included cigarettes. He has a 45.00 pack-year smoking history. He has never used smokeless tobacco. He reports current alcohol use of about 1.0 oz per week. He reports that he does not use drugs.    Allergies  Allergies   Allergen Reactions    Other Environmental Unspecified     Grasses and weeds=asthma     Shellfish Allergy Hives    Strawberry Hives    Pork Allergy      Against pt Voodoo    Fish Unspecified     Bones get stuck in teeth    Lactose Diarrhea     indigestion       Medications  Prior to Admission Medications   Prescriptions Last  Dose Informant Patient Reported? Taking?   ELIQUIS 2.5 MG Tab   No No   Sig: TAKE 1 TABLET BY MOUTH TWICE DAILY   acetaminophen (TYLENOL) 325 MG Tab   Yes No   Sig: Take 2 Tablets by mouth every 6 hours as needed for Mild Pain.   acyclovir (ZOVIRAX) 400 MG tablet   No No   Sig: Take 1 Tablet by mouth 3 times a day. For herpes flare-up; may keep in his room for self-administer   albuterol 108 (90 Base) MCG/ACT Aero Soln inhalation aerosol   No No   Sig: Inhale 2 Puffs every 6 hours as needed for Shortness of Breath.   cetirizine (ZYRTEC) 10 MG Tab   No No   Sig: Take 1 Tablet by mouth every day.   chlorhexidine (PERIDEX) 0.12 % Solution   No No   Sig: Take 15 mL by mouth every day.   docusate sodium (COLACE) 100 MG Cap   No No   Sig: TAKE 2 CAPSULES BY MOUTH ONCE DAILY   finasteride (PROSCAR) 5 MG Tab   No No   Sig: TAKE 1 TABLET BY MOUTH ONCE DAILY   furosemide (LASIX) 40 MG Tab   No No   Sig: TAKE 1 TABLET BY MOUTH ONCE DAILY   hydrOXYzine HCl (ATARAX) 50 MG Tab   No No   Sig: TAKE 1 TABLET BY MOUTH AT BEDTIME   melatonin 5 mg Tab   No No   Sig: TAKE 1 TABLET BY MOUTH AT BEDTIME.   simvastatin (ZOCOR) 10 MG Tab   No No   Sig: TAKE 1 TABLET BY MOUTH EVERY EVENING   tamsulosin (FLOMAX) 0.4 MG capsule   No No   Sig: TAKE 1 CAPSULE BY MOUTH ONCE DAILY      Facility-Administered Medications: None       Physical Exam  Temp:  [36.4 °C (97.6 °F)] 36.4 °C (97.6 °F)  Pulse:  [73-77] 73  Resp:  [20] 20  BP: (119-128)/(65) 119/65  SpO2:  [93 %] 93 %  Blood Pressure : 119/65   Temperature: 36.4 °C (97.6 °F)   Pulse: 73   Respiration: 20   Pulse Oximetry: 93 %       Physical Exam  Vitals and nursing note reviewed.   Constitutional:       General: He is not in acute distress.     Appearance: Normal appearance.   HENT:      Head:      Comments:  there is surgical defect in the mid frontal bone with exposed surgical mesh and  brain tissue underneath.  There is a foul odor   large laceration to the left of the mesh,  repaired with  sutures  Minimal laceration on the right side with capillary bleeding.     Nose: Nose normal.      Mouth/Throat:      Mouth: Mucous membranes are moist.   Eyes:      Extraocular Movements: Extraocular movements intact.      Pupils: Pupils are equal, round, and reactive to light.   Cardiovascular:      Rate and Rhythm: Normal rate and regular rhythm.   Pulmonary:      Effort: Pulmonary effort is normal.      Breath sounds: Normal breath sounds.   Abdominal:      General: Abdomen is flat. There is no distension.      Tenderness: There is no abdominal tenderness.   Musculoskeletal:         General: No swelling or deformity. Normal range of motion.      Cervical back: Normal range of motion and neck supple.   Skin:     General: Skin is warm and dry.   Neurological:      General: No focal deficit present.      Mental Status: He is alert and oriented to person, place, and time.   Psychiatric:         Mood and Affect: Mood normal.         Behavior: Behavior normal.       Laboratory:  Recent Labs     09/27/22  2329   WBC 6.4   RBC 3.89*   HEMOGLOBIN 11.6*   HEMATOCRIT 34.5*   MCV 88.7   MCH 29.8   MCHC 33.6*   RDW 46.3   PLATELETCT 200   MPV 10.3     Recent Labs     09/26/22  1450 09/27/22  2329   SODIUM 141 140   POTASSIUM 3.7 3.5*   CHLORIDE 101 102   CO2 31 31   GLUCOSE 90 102*   BUN 22 20   CREATININE 1.40 1.34   CALCIUM 9.0 8.5     Recent Labs     09/26/22  1450 09/27/22  2329   ALTSGPT 15 9   ASTSGOT 21 15   ALKPHOSPHAT 80 68   TBILIRUBIN 0.6 0.3   GLUCOSE 90 102*         No results for input(s): NTPROBNP in the last 72 hours.      No results for input(s): TROPONINT in the last 72 hours.    Imaging:  DX-HAND 3+ LEFT   Final Result         1.  No acute traumatic bony injury.      CT-HEAD W/O   Final Result         1.  Anterior pneumocephalus, appears likely related to open scalp laceration, and should be considered open head injury   2.  Atherosclerosis.      These findings were discussed with the patient's clinician,  Marty Winkler, on 9/27/2022 10:57 PM.                 Assessment/Plan:  Justification for Admission Status  I anticipate this patient will require at least two midnights for appropriate medical management, necessitating inpatient admission because infected scalp wound    Patient will need a Med/Surg bed on NEUROSURGERY service .  The need is secondary to infected scalp wound.    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  We will plan to consult with neurosurgery tomorrow a.m.  Will keep patient n.p.o. in case patient will require neurosurgical intervention.    Closed head injury  Assessment & Plan  Patient has pre-existing defect in frontal lobe covered with titanium mesh which is exposed to the outside world  CT head without contrast: Anterior pneumocephalus, appears likely related to open scalp laceration, and should be considered open head injury   this is related to chronic bone defect.  Patient denies headache, nausea, vision changes, focal weakness  Monitor clinically  PT OT evaluation    Impaired functional mobility, balance, gait, and endurance- (present on admission)  Assessment & Plan  PT OT evaluation    Chronic atrial fibrillation (HCC)- (present on admission)  Assessment & Plan  Currently in sinus rhythm, heart rate 69  Will hold Eliquis.    Chronic congestive heart failure (HCC)- (present on admission)  Assessment & Plan  Currently not in exacerbation.  Will hold Lasix as we have made patient n.p.o.    Essential hypertension- (present on admission)  Assessment & Plan  Monitor  IV labetalol as needed    History of DVT of lower extremity- (present on admission)  Assessment & Plan  Patient was on Eliquis 2.5 mg twice daily, which is not a DVT  treatment dose, but rather was ordered for A. Fib  We will repeat bilateral lower extremity ultrasound to evaluate for residual DVT to see if patient needs to be on anticoagulation      Mild intermittent asthma without complication- (present on admission)  Assessment & Plan  Stable  Albuterol as needed      VTE prophylaxis: SCDs/TEDs

## 2022-09-28 NOTE — DISCHARGE PLANNING
note:  Received Telephone approval for transfer back from [facility].   Received Telephone approval for transfer back from Dr. Catalan.   Faxed to [phone] to transfer center  Notified Pat at transfer center.  Scanned transfer back agreement in Epic under Media.

## 2022-09-28 NOTE — ED NOTES
PT ambulated independently to restroom with steady gait, this RN at side in event PT needs stabilization. PT educated on how to use call string in restroom if necessary.

## 2022-09-28 NOTE — DISCHARGE PLANNING
note:  Received a transfer back agreement from Catina FREDERICK at transfer center.   CM met with pt and he signed his piece. CM attempted to notify Dr. Catalan but she is in a family meeting and unavailable at this time. Notified Elsy at transfer center.

## 2022-09-28 NOTE — PROGRESS NOTES
Pharmacy Vancomycin Kinetics Note for 9/28/2022     87 y.o. male on Vancomycin day # 1       Vancomycin Indication (Two level/Trough based Dosing): CNS Infection (goal trough 18-22)    Provider specified end date: 10/02/22    Active Antibiotics (From admission, onward)      Ordered     Ordering Provider       Wed Sep 28, 2022  3:38 PM    09/28/22 1538  vancomycin (VANCOCIN) 1,250 mg in  mL IVPB  (vancomycin (VANCOCIN) IV (LD + Maintenance))  ONCE         Amada Catalan M.D.       Wed Sep 28, 2022 12:50 AM    09/28/22 0050  cefepime (Maxipime) 2 g in  mL IVPB  EVERY 12 HOURS         Luis Troy M.D.    09/28/22 0050  MD Alert...Vancomycin per Pharmacy  PHARMACY TO DOSE        Question:  Indication(s) for vancomycin?  Answer:  Skin and soft tissue infection    Luis Troy M.D.    09/28/22 0050  metroNIDAZOLE (Flagyl) IVPB 500 mg  EVERY 8 HOURS         Luis Troy M.D.            Dosing Weight: 62 kg (136 lb 11 oz)      Admission History: Admitted on 9/27/2022 for Wound infection [T14.8XXA, L08.9]  Pertinent history: 86 yo male with remote history of brain tumor resection with mesh placment presents after GLF. On exam skin has been completely eroded away and the mesh was completely exposed, is loose and there is underlying obvious brain that is exposed and concern for easily accessible infection to develop.  The area does have a foul odor and was swabbed with wound culture.    Allergies:     Other environmental, Shellfish allergy, Strawberry, Pork allergy, Fish, and Lactose     Pertinent cultures to date:     Results       Procedure Component Value Units Date/Time    COV-2, FLU A/B, AND RSV BY PCR (2-4 HOURS CEPHEID): Collect NP swab in VTM [[id number]] Collected: 09/28/22 1339    Order Status: Completed Specimen: Respirate Updated: 09/28/22 1508     Influenza virus A RNA Negative     Influenza virus B, PCR Negative     RSV, PCR Negative     SARS-CoV-2 by PCR NotDetected     Comment:  "PATIENTS: Important information regarding your results and instructions can  be found at [email or web address]   \"After your  Covid-19 Test\"    [facility] providers: PLEASE REFER TO DE-ESCALATION AND RETESTING PROTOCOL  on [email or web address]    **The Mobile [employer] GeneXpert Xpress SARS-CoV-2 RT-PCR Test has been made  available for use under the Emergency Use Authorization (EUA) only.          SARS-CoV-2 Source NP Swab    BLOOD CULTURE [[id number]]  (Abnormal) Collected: 09/27/22 2329    Order Status: Completed Specimen: Blood from Peripheral Updated: 09/28/22 1210     Significant Indicator POS     Source BLD     Site PERIPHERAL     Culture Result Growth detected by Bactec instrument. 09/28/2022  12:08  Gram Stain: Gram positive rods.      Narrative:      CALL  [place]  ER tel. ,  CALLED  ER tel.  09/28/2022, 12:09, RB PERF. RESULTS CALLED TO:[id number] RN  Per Hospital Policy: Only change Specimen Src: to \"Line\" if  specified by physician order.  No site indicated    GRAM STAIN [[id number]] Collected: 09/27/22 2345    Order Status: Completed Specimen: Wound Updated: 09/28/22 0634     Significant Indicator .     Source WND     Site Forehead     Gram Stain Result Rare WBCs.  Few Gram positive cocci.      CULTURE WOUND W/ GRAM STAIN [[id number]] Collected: 09/27/22 2345    Order Status: Sent Specimen: Wound from Exudate Updated: 09/28/22 0634     Significant Indicator NEG     Source WND     Site Forehead     Culture Result -     Gram Stain Result Rare WBCs.  Few Gram positive cocci.      BLOOD CULTURE [[id number]] Collected: 09/27/22 2314    Order Status: Sent Specimen: Blood from Peripheral Updated: 09/28/22 0053    Narrative:      Per Hospital Policy: Only change Specimen Src: to \"Line\" if  specified by physician order.            Labs:     Estimated Creatinine Clearance: 29.8 mL/min (by C-G formula based on SCr of 1.34 mg/dL).  Recent Labs     09/27/22 2329   WBC 6.4   NEUTSPOLYS 72.20*     Recent Labs     " "22  1450 22  2329   BUN 22 20   CREATININE 1.40 1.34   ALBUMIN 3.6 3.2       Intake/Output Summary (Last 24 hours) at 2022 1539  Last data filed at 2022 1400  Gross per 24 hour   Intake 100 ml   Output --   Net 100 ml      /58   Pulse 68   Temp 36.4 °C (97.6 °F) (Temporal)   Resp 16   Ht 1.549 m (5' 1\")   Wt 62.1 kg (137 lb)   SpO2 94%  Temp (24hrs), Av.4 °C (97.6 °F), Min:36.4 °C (97.6 °F), Max:36.4 °C (97.6 °F)      List concerns for Vancomycin clearance:     Age;Malnutrition/Low albumin;Other (CKD with CrCl ~30ml/min)    Pharmacokinetics:     A  Trough kinetics:   Recent Labs     22  1334   VANCORANDOM 9.1       A/P:     -  Vancomycin dose: Vancomycin IV 1250 mg x 1 @ 1600    -  Next vancomycin level(s): Will get random level w/AM labs (~12 hours post dose)      -  Comments: Random level 12 hours post loading dose resulted as 9.1. Patient risk factors for accumulation include age, malnutrition, and CKD with CrCl ~30 mL/min. Based on this information, will continue to pulse dose for now to better assess patient's clearance prior to scheduling dose. Continue to monitor ID workup. Pharmacy will continue to follow.     Maty Riggs, PharmD    "

## 2022-09-29 LAB
BACTERIA BLD CULT: ABNORMAL
BACTERIA BLD CULT: ABNORMAL
SIGNIFICANT IND 70042: ABNORMAL
SITE SITE: ABNORMAL
SOURCE SOURCE: ABNORMAL

## 2022-09-29 NOTE — ED NOTES
Per mirna BRICEÑO to stop IVF for transport. Pt transported via [facility] to [facility]. Ambulatory to restroom

## 2022-09-29 NOTE — DISCHARGE PLANNING
note:  Received message from transfer center that pt was accepted by [facility]  Address: [address] [facility] [address]  Accepting MD: Dr. Pete Messer  [address]   RN to RN report [phone]  [facility] pending ETA.    Initiated COBRA.   Telephone order for transfer from Dr. Amada Catalan  [facility] for son Paolo regarding transfer.

## 2022-09-29 NOTE — DISCHARGE SUMMARY
Discharge Summary    CHIEF COMPLAINT ON ADMISSION  Chief Complaint   Patient presents with    Head Injury     BIB from independent living facility, pt had a GLF, +head injury. Laceration to forehead, pt has Metal grad mesh that is always exposed per patient, however laceration to the L side of it. -LOC, +thinners, eliquis. Patient has a history of a brain tumor and had a metal grad mesh placed to forehead. Pt also c/o of L wrist pain. PIV in place.     GLF       Reason for Admission  ems    Admission Date  9/27/2022     CODE STATUS  DNAR/DNI    HPI & HOSPITAL COURSE    87-year-old male with past medical history of A. fib, right lower extremity DVT, hypertension, CHF, frontal lobe meningioma removal in 2010 with subsequent development of right frontal wound complicated with now exposed titanium mesh presenting after a ground-level fall resulting in head injury.  Patient is unsure if he is currently taking Eliquis.  Patient suffered left frontal laceration which was repaired by ERP.  Given malodorous discharge patient was started on broad-spectrum antibiotics including vancomycin, cefepime and flagyl. Wound cultures are growing gram positive cocci and blood culture (1 of 1 bottle) is growing gram positive rods. Patient does not have any neurological symptoms and is alert and oriented. His vitals and labs on admission were normal.     Given patient's extensive wound with exposed titanium mesh pt had already been referred to [facility] for treatment with neurosurgey and plastic surgery on Nov 15. Upon discussion with Dr. Casanova's (neurosurgeon) he recommended transfer to [facility] given infection and possible more immediate removal of mesh.    Upon discussion with neurosurgeon at [facility] MRI brain with and without  was completed however has not yet been read yet by radiologist.    Please note patient takes eliquis at home however this was not continued in hospital. His last dose was 9/27 evening.      Therefore, he is discharged in  fair and stable condition to a [facility] [place] [facility] care.          FOLLOW UP ITEMS POST DISCHARGE  Follow up wound culture and Blood culture results     DISCHARGE DIAGNOSES  Principal Problem:    Wound infection POA: Yes  Active Problems:    Mild intermittent asthma without complication POA: Yes    History of DVT of lower extremity POA: Yes    Essential hypertension (Chronic) POA: Yes    Chronic congestive heart failure (HCC) (Chronic) POA: Yes    Chronic atrial fibrillation (HCC) (Chronic) POA: Yes    Impaired functional mobility, balance, gait, and endurance (Chronic) POA: Yes    Closed head injury POA: Unknown  Resolved Problems:    * No resolved hospital problems. *      FOLLOW UP  Future Appointments   Date Time Provider Department Center   1/12/2023  1:00 PM Wilton Truong D.O. [address]     No follow-up provider specified.    MEDICATIONS ON DISCHARGE     Medication List        START taking these medications        Instructions   MD Alert...Vancomycin per Pharmacy   1 Each by Other route per pharmacy dosing.  Dose: 1 Each     metroNIDAZOLE 500 MG/100ML Soln  Commonly known as: Flagyl   Infuse 100 mL into a venous catheter every 8 hours.  Dose: 500 mg     NS SOLN 100 mL with cefepime 2 GM SOLR 2 g  Start taking on: September 29, 2022   Infuse 2 g into a venous catheter every 12 hours.  Dose: 2 g            CONTINUE taking these medications        Instructions   acetaminophen 325 MG Tabs  Commonly known as: Tylenol   Take 2 Tablets by mouth every 6 hours as needed for Mild Pain.  Dose: 2 Tablet     acyclovir 400 MG tablet  Commonly known as: Zovirax   Take 1 Tablet by mouth 3 times a day. For herpes flare-up; may keep in his room for self-administer  Dose: 400 mg     albuterol 108 (90 Base) MCG/ACT Aers inhalation aerosol   Inhale 2 Puffs every 6 hours as needed for Shortness of Breath.  Dose: 2 Puff     cetirizine 10 MG Tabs  Commonly known as: ZYRTEC   Take 1 Tablet by mouth  every day.  Dose: 10 mg     chlorhexidine 0.12 % Soln  Commonly known as: PERIDEX   Take 15 mL by mouth every day.  Dose: 15 mL     docusate sodium 100 MG Caps  Commonly known as: COLACE   TAKE 2 CAPSULES BY MOUTH ONCE DAILY     Eliquis 2.5mg Tabs  Generic drug: apixaban   TAKE 1 TABLET BY MOUTH TWICE DAILY     finasteride 5 MG Tabs  Commonly known as: PROSCAR   TAKE 1 TABLET BY MOUTH ONCE DAILY     furosemide 40 MG Tabs  Commonly known as: LASIX   TAKE 1 TABLET BY MOUTH ONCE DAILY     hydrOXYzine HCl 50 MG Tabs  Commonly known as: ATARAX   TAKE 1 TABLET BY MOUTH AT BEDTIME  Dose: 50 mg     melatonin 5 mg Tabs   TAKE 1 TABLET BY MOUTH AT BEDTIME.  Dose: 5 mg     simvastatin 10 MG Tabs  Commonly known as: ZOCOR   TAKE 1 TABLET BY MOUTH EVERY EVENING  Dose: 10 mg     tamsulosin 0.4 MG capsule  Commonly known as: FLOMAX   TAKE 1 CAPSULE BY MOUTH ONCE DAILY              Allergies  Allergies   Allergen Reactions    Other Environmental Unspecified     Grasses and weeds=asthma     Shellfish Allergy Hives    Strawberry Hives    Pork Allergy      Against pt [place]    Fish Unspecified     Bones get stuck in teeth    Lactose Diarrhea     indigestion       DIET  Orders Placed This Encounter   Procedures    Diet NPO Restrict to: Sips with Medications     Standing Status:   Standing     Number of Occurrences:   1     Order Specific Question:   Diet NPO Restrict to:     Answer:   Sips with Medications [3]       ACTIVITY  As tolerated.  Weight bearing as tolerated    LINES, DRAINS, AND WOUNDS  This is an automated list. Peripheral IVs will be removed prior to discharge.  Peripheral IV 09/27/22 18 G Right Antecubital (Active)   Site Assessment Clean;Dry;Intact 09/27/22 2322   Dressing Type Tape;Transparent Film 09/27/22 2322   Line Status Blood return noted;Lab draw;Scrubbed the hub prior to access;Flushed;Saline locked 09/27/22 2322   Dressing Status Clean;Dry;Intact 09/27/22 2322   Dressing Intervention Initial dressing 09/27/22  2322   Infiltration Grading ([facility] [place] [place]) 0 09/27/22 2322   Phlebitis Scale ([facility] Only) 0 09/27/22 2322       Peripheral IV 09/28/22 20 G Right Forearm (Active)   Site Assessment Clean;Dry 09/28/22 1331   Dressing Type Transparent 09/28/22 1331   Line Status Blood return noted;Saline locked 09/28/22 1331       Wound 09/28/22 Full Thickness Wound Head (Active)   Wound Image    09/28/22 1500   Site Assessment Yellow;Red;[place] 09/28/22 1500   Periwound Assessment Red;Pink 09/28/22 1500   Margins Defined edges;Unattached edges 09/28/22 1500   Closure Adhesive bandage 09/28/22 1500   Drainage Amount Small 09/28/22 1500   Drainage Description Sanguineous 09/28/22 1500   Treatments Cleansed;Site care 09/28/22 1500   Wound Cleansing Normal Saline Irrigation 09/28/22 1500   Periwound Protectant Not Applicable 09/28/22 1500   Dressing Cleansing/Solutions Not Applicable 09/28/22 1500   Dressing Options Petrolatum Gauze (yellow);Absorbent Abdominal Pad 09/28/22 1500   Dressing Changed New 09/28/22 1500   Dressing Status Clean;Dry;Intact 09/28/22 1500   Dressing Change/Treatment Frequency Daily, and As Needed 09/28/22 1500   NEXT Dressing Change/Treatment Date 09/29/22 09/28/22 1500   NEXT Weekly Photo (Inpatient Only) 10/05/22 09/28/22 1500   Non-staged Wound Description Full thickness 09/28/22 1500   Shape rectangle 09/28/22 1500   Wound Odor None 09/28/22 1500   Pulses N/A 09/28/22 1500   Exposed Structures Mesh;Sutures;VENESSA 09/28/22 1500   WOUND NURSE ONLY - Time Spent with Patient (mins) 75 09/28/22 1500       Peripheral IV 09/27/22 18 G Right Antecubital (Active)   Site Assessment Clean;Dry;Intact 09/27/22 2322   Dressing Type Tape;Transparent Film 09/27/22 2322   Line Status Blood return noted;Lab draw;Scrubbed the hub prior to access;Flushed;Saline locked 09/27/22 2322   Dressing Status Clean;Dry;Intact 09/27/22 2322   Dressing Intervention Initial dressing 09/27/22 2322   Infiltration Grading ([facility] [place] [place]) 0 09/27/22  2322   Phlebitis Scale ([facility] Only) 0 09/27/22 2322       Peripheral IV 09/28/22 20 G Right Forearm (Active)   Site Assessment Clean;Dry 09/28/22 1331   Dressing Type Transparent 09/28/22 1331   Line Status Blood return noted;Saline locked 09/28/22 1331               MENTAL STATUS ON TRANSFER             CONSULTATIONS  Neurosurgery  ID    PROCEDURES  none    LABORATORY  Lab Results   Component Value Date    SODIUM 140 09/27/2022    POTASSIUM 3.5 (L) 09/27/2022    CHLORIDE 102 09/27/2022    CO2 31 09/27/2022    GLUCOSE 102 (H) 09/27/2022    BUN 20 09/27/2022    CREATININE 1.34 09/27/2022        Lab Results   Component Value Date    WBC 6.4 09/27/2022    HEMOGLOBIN 11.6 (L) 09/27/2022    HEMATOCRIT 34.5 (L) 09/27/2022    PLATELETCT 200 09/27/2022        Total time of the discharge process [id number] minutes.

## 2022-09-29 NOTE — ED NOTES
18g IV in RAC infiltrated with vancomycin- approx 2 inch area of raised redness present above IV. PIV removed. ER pharmacist consulted- ice applied to site. Will reevaluate in 20 min. MD Catalan notified via voalte

## 2022-09-29 NOTE — DISCHARGE PLANNING
Transport packet given to bedside RN ([facility], [facility], [facility], [facility]). Transportation is still undetermined at this time, RN directed to call [facility] @ [phone] for further information.

## 2022-09-30 LAB
BACTERIA WND AEROBE CULT: ABNORMAL
GRAM STN SPEC: ABNORMAL
SIGNIFICANT IND 70042: ABNORMAL
SITE SITE: ABNORMAL
SOURCE SOURCE: ABNORMAL

## 2022-10-03 LAB
BACTERIA BLD CULT: NORMAL
SIGNIFICANT IND 70042: NORMAL
SITE SITE: NORMAL
SOURCE SOURCE: NORMAL

## 2023-01-01 ENCOUNTER — PATIENT OUTREACH (OUTPATIENT)
Dept: HEALTH INFORMATION MANAGEMENT | Facility: OTHER | Age: 88
End: 2023-01-01
Payer: MEDICARE

## 2023-01-01 ENCOUNTER — APPOINTMENT (OUTPATIENT)
Dept: RADIOLOGY | Facility: MEDICAL CENTER | Age: 88
End: 2023-01-01
Attending: STUDENT IN AN ORGANIZED HEALTH CARE EDUCATION/TRAINING PROGRAM
Payer: MEDICARE

## 2023-01-01 ENCOUNTER — HOSPITAL ENCOUNTER (OUTPATIENT)
Dept: LAB | Facility: MEDICAL CENTER | Age: 88
End: 2023-08-03
Attending: FAMILY MEDICINE
Payer: MEDICARE

## 2023-01-01 ENCOUNTER — OFFICE VISIT (OUTPATIENT)
Dept: MEDICAL GROUP | Facility: MEDICAL CENTER | Age: 88
End: 2023-01-01
Payer: MEDICARE

## 2023-01-01 ENCOUNTER — TELEPHONE (OUTPATIENT)
Dept: HEALTH INFORMATION MANAGEMENT | Facility: OTHER | Age: 88
End: 2023-01-01

## 2023-01-01 ENCOUNTER — HOSPITAL ENCOUNTER (OUTPATIENT)
Facility: MEDICAL CENTER | Age: 88
End: 2023-11-01
Attending: STUDENT IN AN ORGANIZED HEALTH CARE EDUCATION/TRAINING PROGRAM | Admitting: STUDENT IN AN ORGANIZED HEALTH CARE EDUCATION/TRAINING PROGRAM
Payer: MEDICARE

## 2023-01-01 ENCOUNTER — TELEPHONE (OUTPATIENT)
Dept: MEDICAL GROUP | Facility: MEDICAL CENTER | Age: 88
End: 2023-01-01
Payer: MEDICARE

## 2023-01-01 ENCOUNTER — PATIENT MESSAGE (OUTPATIENT)
Dept: HEALTH INFORMATION MANAGEMENT | Facility: OTHER | Age: 88
End: 2023-01-01

## 2023-01-01 ENCOUNTER — TELEPHONE (OUTPATIENT)
Dept: SCHEDULING | Facility: IMAGING CENTER | Age: 88
End: 2023-01-01

## 2023-01-01 ENCOUNTER — TELEPHONE (OUTPATIENT)
Dept: MEDICAL GROUP | Facility: MEDICAL CENTER | Age: 88
End: 2023-01-01

## 2023-01-01 ENCOUNTER — HOSPITAL ENCOUNTER (OUTPATIENT)
Dept: RADIOLOGY | Facility: MEDICAL CENTER | Age: 88
End: 2023-08-03
Attending: FAMILY MEDICINE
Payer: MEDICARE

## 2023-01-01 ENCOUNTER — APPOINTMENT (OUTPATIENT)
Dept: MEDICAL GROUP | Facility: MEDICAL CENTER | Age: 88
End: 2023-01-01
Payer: MEDICARE

## 2023-01-01 ENCOUNTER — HOSPITAL ENCOUNTER (OUTPATIENT)
Dept: CARDIOLOGY | Facility: MEDICAL CENTER | Age: 88
End: 2023-09-07
Attending: FAMILY MEDICINE
Payer: MEDICARE

## 2023-01-01 ENCOUNTER — TELEPHONE (OUTPATIENT)
Dept: HEALTH INFORMATION MANAGEMENT | Facility: OTHER | Age: 88
End: 2023-01-01
Payer: MEDICARE

## 2023-01-01 VITALS
TEMPERATURE: 97.7 F | BODY MASS INDEX: 25.07 KG/M2 | DIASTOLIC BLOOD PRESSURE: 58 MMHG | WEIGHT: 132.8 LBS | SYSTOLIC BLOOD PRESSURE: 108 MMHG | HEIGHT: 61 IN | OXYGEN SATURATION: 98 % | HEART RATE: 62 BPM

## 2023-01-01 VITALS
OXYGEN SATURATION: 95 % | DIASTOLIC BLOOD PRESSURE: 66 MMHG | WEIGHT: 142.4 LBS | TEMPERATURE: 97.3 F | HEART RATE: 68 BPM | SYSTOLIC BLOOD PRESSURE: 124 MMHG | HEIGHT: 61 IN | BODY MASS INDEX: 26.88 KG/M2

## 2023-01-01 VITALS
HEIGHT: 61 IN | TEMPERATURE: 97.3 F | SYSTOLIC BLOOD PRESSURE: 114 MMHG | WEIGHT: 145 LBS | DIASTOLIC BLOOD PRESSURE: 52 MMHG | HEIGHT: 61 IN | WEIGHT: 145 LBS | DIASTOLIC BLOOD PRESSURE: 68 MMHG | TEMPERATURE: 96.7 F | OXYGEN SATURATION: 95 % | SYSTOLIC BLOOD PRESSURE: 116 MMHG | HEIGHT: 61 IN | WEIGHT: 145.8 LBS | OXYGEN SATURATION: 97 % | SYSTOLIC BLOOD PRESSURE: 108 MMHG | BODY MASS INDEX: 27.53 KG/M2 | HEART RATE: 60 BPM | HEART RATE: 72 BPM | OXYGEN SATURATION: 95 % | BODY MASS INDEX: 27.38 KG/M2 | HEART RATE: 71 BPM | TEMPERATURE: 97.7 F | BODY MASS INDEX: 27.38 KG/M2 | DIASTOLIC BLOOD PRESSURE: 60 MMHG

## 2023-01-01 VITALS
OXYGEN SATURATION: 98 % | TEMPERATURE: 97.8 F | HEART RATE: 77 BPM | WEIGHT: 138.8 LBS | HEIGHT: 61 IN | SYSTOLIC BLOOD PRESSURE: 134 MMHG | BODY MASS INDEX: 26.21 KG/M2 | DIASTOLIC BLOOD PRESSURE: 66 MMHG

## 2023-01-01 VITALS
TEMPERATURE: 97.5 F | WEIGHT: 133.8 LBS | OXYGEN SATURATION: 97 % | HEART RATE: 77 BPM | HEIGHT: 61 IN | BODY MASS INDEX: 25.26 KG/M2 | DIASTOLIC BLOOD PRESSURE: 68 MMHG | SYSTOLIC BLOOD PRESSURE: 112 MMHG

## 2023-01-01 VITALS
HEART RATE: 58 BPM | HEIGHT: 61 IN | DIASTOLIC BLOOD PRESSURE: 60 MMHG | TEMPERATURE: 97.6 F | BODY MASS INDEX: 27.56 KG/M2 | OXYGEN SATURATION: 98 % | WEIGHT: 146 LBS | RESPIRATION RATE: 16 BRPM | SYSTOLIC BLOOD PRESSURE: 106 MMHG

## 2023-01-01 VITALS
BODY MASS INDEX: 28.26 KG/M2 | HEIGHT: 61 IN | RESPIRATION RATE: 16 BRPM | TEMPERATURE: 99.1 F | HEART RATE: 103 BPM | DIASTOLIC BLOOD PRESSURE: 50 MMHG | WEIGHT: 149.69 LBS | OXYGEN SATURATION: 47 % | SYSTOLIC BLOOD PRESSURE: 101 MMHG

## 2023-01-01 VITALS
RESPIRATION RATE: 16 BRPM | SYSTOLIC BLOOD PRESSURE: 124 MMHG | BODY MASS INDEX: 27.38 KG/M2 | DIASTOLIC BLOOD PRESSURE: 70 MMHG | HEART RATE: 72 BPM | WEIGHT: 145 LBS | TEMPERATURE: 97.8 F | OXYGEN SATURATION: 93 % | HEIGHT: 61 IN

## 2023-01-01 DIAGNOSIS — I50.9 CHRONIC CONGESTIVE HEART FAILURE, UNSPECIFIED HEART FAILURE TYPE (HCC): Chronic | ICD-10-CM

## 2023-01-01 DIAGNOSIS — B37.89 CANDIDA RASH OF GROIN: ICD-10-CM

## 2023-01-01 DIAGNOSIS — I48.20 CHRONIC ATRIAL FIBRILLATION (HCC): Chronic | ICD-10-CM

## 2023-01-01 DIAGNOSIS — R21 RASH OF BOTH FEET: ICD-10-CM

## 2023-01-01 DIAGNOSIS — N18.31 STAGE 3A CHRONIC KIDNEY DISEASE: ICD-10-CM

## 2023-01-01 DIAGNOSIS — M25.512 CHRONIC LEFT SHOULDER PAIN: ICD-10-CM

## 2023-01-01 DIAGNOSIS — Z48.02 VISIT FOR SUTURE REMOVAL: ICD-10-CM

## 2023-01-01 DIAGNOSIS — L03.019: ICD-10-CM

## 2023-01-01 DIAGNOSIS — R60.0 BILATERAL LOWER EXTREMITY EDEMA: ICD-10-CM

## 2023-01-01 DIAGNOSIS — Z94.5 STATUS POST SKIN FLAP GRAFT: Chronic | ICD-10-CM

## 2023-01-01 DIAGNOSIS — R25.2 MUSCLE CRAMPS: ICD-10-CM

## 2023-01-01 DIAGNOSIS — G89.29 CHRONIC LEFT SHOULDER PAIN: ICD-10-CM

## 2023-01-01 DIAGNOSIS — L98.499: Chronic | ICD-10-CM

## 2023-01-01 DIAGNOSIS — L89.320 PRESSURE INJURY OF LEFT BUTTOCK, UNSTAGEABLE (HCC): ICD-10-CM

## 2023-01-01 DIAGNOSIS — R21 RASH OF FOOT: ICD-10-CM

## 2023-01-01 DIAGNOSIS — D68.69 SECONDARY HYPERCOAGULABLE STATE (HCC): ICD-10-CM

## 2023-01-01 DIAGNOSIS — R56.9 SEIZURE (HCC): ICD-10-CM

## 2023-01-01 DIAGNOSIS — I60.9 SAH (SUBARACHNOID HEMORRHAGE) (HCC): ICD-10-CM

## 2023-01-01 DIAGNOSIS — B37.2 YEAST INFECTION OF THE SKIN: ICD-10-CM

## 2023-01-01 DIAGNOSIS — T81.49XA WOUND INFECTION AFTER SURGERY: ICD-10-CM

## 2023-01-01 DIAGNOSIS — Z23 NEED FOR VACCINATION: ICD-10-CM

## 2023-01-01 DIAGNOSIS — I60.9 SUBARACHNOID HEMORRHAGE (HCC): ICD-10-CM

## 2023-01-01 DIAGNOSIS — L03.019 PARONYCHIA OF FINGER, UNSPECIFIED LATERALITY: ICD-10-CM

## 2023-01-01 DIAGNOSIS — L89.329 PRESSURE INJURY OF SKIN OF LEFT BUTTOCK, UNSPECIFIED INJURY STAGE: ICD-10-CM

## 2023-01-01 DIAGNOSIS — Z86.718 HISTORY OF DVT OF LOWER EXTREMITY: ICD-10-CM

## 2023-01-01 DIAGNOSIS — L89.321 PRESSURE INJURY OF LEFT BUTTOCK, STAGE 1: ICD-10-CM

## 2023-01-01 LAB
ALBUMIN SERPL BCP-MCNC: 2.5 G/DL (ref 3.2–4.9)
ALBUMIN SERPL BCP-MCNC: 3.4 G/DL (ref 3.2–4.9)
ALBUMIN/GLOB SERPL: 0.8 G/DL
ALBUMIN/GLOB SERPL: 1 G/DL
ALP SERPL-CCNC: 123 U/L (ref 30–99)
ALP SERPL-CCNC: 125 U/L (ref 30–99)
ALT SERPL-CCNC: 14 U/L (ref 2–50)
ALT SERPL-CCNC: 19 U/L (ref 2–50)
ANION GAP SERPL CALC-SCNC: 7 MMOL/L (ref 7–16)
ANION GAP SERPL CALC-SCNC: 8 MMOL/L (ref 7–16)
AST SERPL-CCNC: 19 U/L (ref 12–45)
AST SERPL-CCNC: 31 U/L (ref 12–45)
BASOPHILS # BLD AUTO: 0.3 % (ref 0–1.8)
BASOPHILS # BLD: 0.03 K/UL (ref 0–0.12)
BILIRUB SERPL-MCNC: 0.4 MG/DL (ref 0.1–1.5)
BILIRUB SERPL-MCNC: 0.4 MG/DL (ref 0.1–1.5)
BUN SERPL-MCNC: 29 MG/DL (ref 8–22)
BUN SERPL-MCNC: 29 MG/DL (ref 8–22)
CALCIUM ALBUM COR SERPL-MCNC: 10 MG/DL (ref 8.5–10.5)
CALCIUM ALBUM COR SERPL-MCNC: 9.5 MG/DL (ref 8.5–10.5)
CALCIUM SERPL-MCNC: 8.3 MG/DL (ref 8.5–10.5)
CALCIUM SERPL-MCNC: 9.5 MG/DL (ref 8.5–10.5)
CHLORIDE SERPL-SCNC: 103 MMOL/L (ref 96–112)
CHLORIDE SERPL-SCNC: 104 MMOL/L (ref 96–112)
CO2 SERPL-SCNC: 25 MMOL/L (ref 20–33)
CO2 SERPL-SCNC: 33 MMOL/L (ref 20–33)
CREAT SERPL-MCNC: 1.49 MG/DL (ref 0.5–1.4)
CREAT SERPL-MCNC: 1.66 MG/DL (ref 0.5–1.4)
EOSINOPHIL # BLD AUTO: 0.03 K/UL (ref 0–0.51)
EOSINOPHIL NFR BLD: 0.3 % (ref 0–6.9)
ERYTHROCYTE [DISTWIDTH] IN BLOOD BY AUTOMATED COUNT: 50.5 FL (ref 35.9–50)
ERYTHROCYTE [DISTWIDTH] IN BLOOD BY AUTOMATED COUNT: 50.9 FL (ref 35.9–50)
GFR SERPLBLD CREATININE-BSD FMLA CKD-EPI: 40 ML/MIN/1.73 M 2
GFR SERPLBLD CREATININE-BSD FMLA CKD-EPI: 45 ML/MIN/1.73 M 2
GLOBULIN SER CALC-MCNC: 3 G/DL (ref 1.9–3.5)
GLOBULIN SER CALC-MCNC: 3.3 G/DL (ref 1.9–3.5)
GLUCOSE SERPL-MCNC: 110 MG/DL (ref 65–99)
GLUCOSE SERPL-MCNC: 90 MG/DL (ref 65–99)
HCT VFR BLD AUTO: 33.8 % (ref 42–52)
HCT VFR BLD AUTO: 39.4 % (ref 42–52)
HGB BLD-MCNC: 11.3 G/DL (ref 14–18)
HGB BLD-MCNC: 12.7 G/DL (ref 14–18)
IMM GRANULOCYTES # BLD AUTO: 0.02 K/UL (ref 0–0.11)
IMM GRANULOCYTES NFR BLD AUTO: 0.2 % (ref 0–0.9)
LACTATE SERPL-SCNC: 1.6 MMOL/L (ref 0.5–2)
LV EJECT FRACT  99904: 55
LV EJECT FRACT MOD 2C 99903: 55.36
LV EJECT FRACT MOD 4C 99902: 55.85
LV EJECT FRACT MOD BP 99901: 56.3
LYMPHOCYTES # BLD AUTO: 0.51 K/UL (ref 1–4.8)
LYMPHOCYTES NFR BLD: 5.9 % (ref 22–41)
MCH RBC QN AUTO: 30.2 PG (ref 27–33)
MCH RBC QN AUTO: 30.7 PG (ref 27–33)
MCHC RBC AUTO-ENTMCNC: 32.2 G/DL (ref 32.3–36.5)
MCHC RBC AUTO-ENTMCNC: 33.4 G/DL (ref 32.3–36.5)
MCV RBC AUTO: 91.8 FL (ref 81.4–97.8)
MCV RBC AUTO: 93.6 FL (ref 81.4–97.8)
MONOCYTES # BLD AUTO: 1.33 K/UL (ref 0–0.85)
MONOCYTES NFR BLD AUTO: 15.3 % (ref 0–13.4)
NEUTROPHILS # BLD AUTO: 6.79 K/UL (ref 1.82–7.42)
NEUTROPHILS NFR BLD: 78 % (ref 44–72)
NRBC # BLD AUTO: 0 K/UL
NRBC BLD-RTO: 0 /100 WBC (ref 0–0.2)
NT-PROBNP SERPL IA-MCNC: 246 PG/ML (ref 0–125)
PLATELET # BLD AUTO: 174 K/UL (ref 164–446)
PLATELET # BLD AUTO: 190 K/UL (ref 164–446)
PMV BLD AUTO: 11.5 FL (ref 9–12.9)
PMV BLD AUTO: 11.7 FL (ref 9–12.9)
POTASSIUM SERPL-SCNC: 3.8 MMOL/L (ref 3.6–5.5)
POTASSIUM SERPL-SCNC: 4.4 MMOL/L (ref 3.6–5.5)
PROT SERPL-MCNC: 5.5 G/DL (ref 6–8.2)
PROT SERPL-MCNC: 6.7 G/DL (ref 6–8.2)
RBC # BLD AUTO: 3.68 M/UL (ref 4.7–6.1)
RBC # BLD AUTO: 4.21 M/UL (ref 4.7–6.1)
SODIUM SERPL-SCNC: 137 MMOL/L (ref 135–145)
SODIUM SERPL-SCNC: 143 MMOL/L (ref 135–145)
WBC # BLD AUTO: 6.9 K/UL (ref 4.8–10.8)
WBC # BLD AUTO: 8.7 K/UL (ref 4.8–10.8)

## 2023-01-01 PROCEDURE — 80053 COMPREHEN METABOLIC PANEL: CPT

## 2023-01-01 PROCEDURE — 3078F DIAST BP <80 MM HG: CPT | Performed by: FAMILY MEDICINE

## 2023-01-01 PROCEDURE — 70486 CT MAXILLOFACIAL W/O DYE: CPT

## 2023-01-01 PROCEDURE — 96376 TX/PRO/DX INJ SAME DRUG ADON: CPT

## 2023-01-01 PROCEDURE — 90677 PCV20 VACCINE IM: CPT | Performed by: FAMILY MEDICINE

## 2023-01-01 PROCEDURE — 72128 CT CHEST SPINE W/O DYE: CPT

## 2023-01-01 PROCEDURE — 99214 OFFICE O/P EST MOD 30 MIN: CPT | Mod: 25 | Performed by: FAMILY MEDICINE

## 2023-01-01 PROCEDURE — 85025 COMPLETE CBC W/AUTO DIFF WBC: CPT

## 2023-01-01 PROCEDURE — 72131 CT LUMBAR SPINE W/O DYE: CPT

## 2023-01-01 PROCEDURE — 3074F SYST BP LT 130 MM HG: CPT | Performed by: FAMILY MEDICINE

## 2023-01-01 PROCEDURE — 15853 REMOVAL SUTR/STAPL XREQ ANES: CPT | Performed by: FAMILY MEDICINE

## 2023-01-01 PROCEDURE — 700111 HCHG RX REV CODE 636 W/ 250 OVERRIDE (IP): Mod: JZ | Performed by: STUDENT IN AN ORGANIZED HEALTH CARE EDUCATION/TRAINING PROGRAM

## 2023-01-01 PROCEDURE — 36415 COLL VENOUS BLD VENIPUNCTURE: CPT

## 2023-01-01 PROCEDURE — G0378 HOSPITAL OBSERVATION PER HR: HCPCS

## 2023-01-01 PROCEDURE — G0009 ADMIN PNEUMOCOCCAL VACCINE: HCPCS | Performed by: FAMILY MEDICINE

## 2023-01-01 PROCEDURE — 700102 HCHG RX REV CODE 250 W/ 637 OVERRIDE(OP)

## 2023-01-01 PROCEDURE — 96375 TX/PRO/DX INJ NEW DRUG ADDON: CPT | Mod: XU

## 2023-01-01 PROCEDURE — 99285 EMERGENCY DEPT VISIT HI MDM: CPT

## 2023-01-01 PROCEDURE — 99214 OFFICE O/P EST MOD 30 MIN: CPT | Performed by: FAMILY MEDICINE

## 2023-01-01 PROCEDURE — 96376 TX/PRO/DX INJ SAME DRUG ADON: CPT | Mod: XU

## 2023-01-01 PROCEDURE — 700117 HCHG RX CONTRAST REV CODE 255: Performed by: STUDENT IN AN ORGANIZED HEALTH CARE EDUCATION/TRAINING PROGRAM

## 2023-01-01 PROCEDURE — 70450 CT HEAD/BRAIN W/O DYE: CPT

## 2023-01-01 PROCEDURE — 99238 HOSP IP/OBS DSCHRG MGMT 30/<: CPT

## 2023-01-01 PROCEDURE — 73030 X-RAY EXAM OF SHOULDER: CPT | Mod: LT

## 2023-01-01 PROCEDURE — 93306 TTE W/DOPPLER COMPLETE: CPT | Mod: 26 | Performed by: INTERNAL MEDICINE

## 2023-01-01 PROCEDURE — 87040 BLOOD CULTURE FOR BACTERIA: CPT

## 2023-01-01 PROCEDURE — 96365 THER/PROPH/DIAG IV INF INIT: CPT | Mod: XU

## 2023-01-01 PROCEDURE — 72125 CT NECK SPINE W/O DYE: CPT

## 2023-01-01 PROCEDURE — 83880 ASSAY OF NATRIURETIC PEPTIDE: CPT

## 2023-01-01 PROCEDURE — A9270 NON-COVERED ITEM OR SERVICE: HCPCS

## 2023-01-01 PROCEDURE — 305948 HCHG GREEN TRAUMA ACT PRE-NOTIFY NO CC

## 2023-01-01 PROCEDURE — 700111 HCHG RX REV CODE 636 W/ 250 OVERRIDE (IP): Mod: JZ,JG | Performed by: STUDENT IN AN ORGANIZED HEALTH CARE EDUCATION/TRAINING PROGRAM

## 2023-01-01 PROCEDURE — 93306 TTE W/DOPPLER COMPLETE: CPT

## 2023-01-01 PROCEDURE — 99497 ADVNCD CARE PLAN 30 MIN: CPT | Performed by: STUDENT IN AN ORGANIZED HEALTH CARE EDUCATION/TRAINING PROGRAM

## 2023-01-01 PROCEDURE — 71260 CT THORAX DX C+: CPT

## 2023-01-01 PROCEDURE — 83605 ASSAY OF LACTIC ACID: CPT

## 2023-01-01 PROCEDURE — 99213 OFFICE O/P EST LOW 20 MIN: CPT | Performed by: FAMILY MEDICINE

## 2023-01-01 PROCEDURE — 85027 COMPLETE CBC AUTOMATED: CPT

## 2023-01-01 PROCEDURE — 51798 US URINE CAPACITY MEASURE: CPT

## 2023-01-01 PROCEDURE — 99223 1ST HOSP IP/OBS HIGH 75: CPT | Mod: 25,AI | Performed by: STUDENT IN AN ORGANIZED HEALTH CARE EDUCATION/TRAINING PROGRAM

## 2023-01-01 RX ORDER — MINOCYCLINE HYDROCHLORIDE 100 MG/1
100 CAPSULE ORAL 2 TIMES DAILY
Qty: 60 CAPSULE | Refills: 11 | Status: SHIPPED | OUTPATIENT
Start: 2023-01-01 | End: 2023-11-02

## 2023-01-01 RX ORDER — CLOTRIMAZOLE AND BETAMETHASONE DIPROPIONATE 10; .64 MG/G; MG/G
1 CREAM TOPICAL 2 TIMES DAILY
Qty: 45 G | Refills: 0 | Status: SHIPPED | OUTPATIENT
Start: 2023-01-01 | End: 2023-01-01

## 2023-01-01 RX ORDER — ATROPINE SULFATE 10 MG/ML
2 SOLUTION/ DROPS OPHTHALMIC EVERY 4 HOURS PRN
Status: DISCONTINUED | OUTPATIENT
Start: 2023-01-01 | End: 2023-01-01 | Stop reason: HOSPADM

## 2023-01-01 RX ORDER — ACETAMINOPHEN 500 MG
500-1000 TABLET ORAL EVERY 8 HOURS PRN
COMMUNITY

## 2023-01-01 RX ORDER — ONDANSETRON 2 MG/ML
8 INJECTION INTRAMUSCULAR; INTRAVENOUS EVERY 8 HOURS PRN
Status: DISCONTINUED | OUTPATIENT
Start: 2023-01-01 | End: 2023-01-01 | Stop reason: HOSPADM

## 2023-01-01 RX ORDER — ACETAMINOPHEN 500 MG
500 TABLET ORAL
COMMUNITY
Start: 2022-01-01 | End: 2023-01-01

## 2023-01-01 RX ORDER — SODIUM PHOSPHATE,MONO-DIBASIC 19G-7G/118
1 ENEMA (ML) RECTAL
COMMUNITY
End: 2023-01-01 | Stop reason: SDUPTHER

## 2023-01-01 RX ORDER — SODIUM PHOSPHATE,MONO-DIBASIC 19G-7G/118
1 ENEMA (ML) RECTAL PRN
Qty: 133 ML | Refills: 11 | Status: SHIPPED | OUTPATIENT
Start: 2023-01-01 | End: 2023-11-02

## 2023-01-01 RX ORDER — FUROSEMIDE 40 MG/1
40-80 TABLET ORAL EVERY MORNING
Qty: 90 TABLET | Refills: 3 | Status: SHIPPED | OUTPATIENT
Start: 2023-01-01 | End: 2023-11-02

## 2023-01-01 RX ORDER — KETOCONAZOLE 20 MG/G
1 CREAM TOPICAL DAILY
Qty: 30 G | Refills: 1 | Status: SHIPPED | OUTPATIENT
Start: 2023-01-01 | End: 2023-01-01 | Stop reason: SDUPTHER

## 2023-01-01 RX ORDER — SODIUM PHOSPHATE,MONO-DIBASIC 19G-7G/118
1 ENEMA (ML) RECTAL PRN
Qty: 133 ML | Refills: 11 | Status: SHIPPED | OUTPATIENT
Start: 2023-01-01 | End: 2023-01-01 | Stop reason: SDUPTHER

## 2023-01-01 RX ORDER — ACETAMINOPHEN/DIPHENHYDRAMINE 500MG-25MG
1 TABLET ORAL NIGHTLY PRN
Qty: 14 TABLET | Refills: 11 | Status: SHIPPED | OUTPATIENT
Start: 2023-01-01 | End: 2023-11-02

## 2023-01-01 RX ORDER — PETROLATUM,WHITE/LANOLIN
OINTMENT (GRAM) TOPICAL
COMMUNITY
End: 2023-01-01

## 2023-01-01 RX ORDER — FINASTERIDE 5 MG/1
5 TABLET, FILM COATED ORAL DAILY
Qty: 90 TABLET | Refills: 3 | Status: SHIPPED | OUTPATIENT
Start: 2023-01-01 | End: 2023-01-01

## 2023-01-01 RX ORDER — CARBOXYMETHYLCELLULOSE SODIUM 5 MG/ML
1 SOLUTION/ DROPS OPHTHALMIC PRN
Status: DISCONTINUED | OUTPATIENT
Start: 2023-01-01 | End: 2023-01-01 | Stop reason: HOSPADM

## 2023-01-01 RX ORDER — SIMVASTATIN 10 MG
10 TABLET ORAL EVERY EVENING
Qty: 90 TABLET | Refills: 3 | Status: SHIPPED | OUTPATIENT
Start: 2023-01-01 | End: 2023-01-01

## 2023-01-01 RX ORDER — TRAMADOL HYDROCHLORIDE 50 MG/1
25 TABLET ORAL EVERY 12 HOURS PRN
Qty: 45 TABLET | Refills: 0 | Status: SHIPPED | OUTPATIENT
Start: 2023-01-01 | End: 2023-01-01 | Stop reason: SDUPTHER

## 2023-01-01 RX ORDER — FUROSEMIDE 20 MG/1
20-40 TABLET ORAL 2 TIMES DAILY
Qty: 120 TABLET | Refills: 11 | Status: SHIPPED | OUTPATIENT
Start: 2023-01-01 | End: 2023-01-01

## 2023-01-01 RX ORDER — PETROLATUM,WHITE/LANOLIN
1 OINTMENT (GRAM) TOPICAL DAILY
Qty: 113 G | Refills: 11 | Status: SHIPPED | OUTPATIENT
Start: 2023-01-01 | End: 2023-11-02

## 2023-01-01 RX ORDER — NYSTATIN 100000 U/G
1 CREAM TOPICAL 2 TIMES DAILY
Qty: 30 G | Refills: 1 | Status: SHIPPED | OUTPATIENT
Start: 2023-01-01 | End: 2023-01-01

## 2023-01-01 RX ORDER — ONDANSETRON 4 MG/1
8 TABLET, ORALLY DISINTEGRATING ORAL EVERY 8 HOURS PRN
Status: DISCONTINUED | OUTPATIENT
Start: 2023-01-01 | End: 2023-01-01

## 2023-01-01 RX ORDER — TRAMADOL HYDROCHLORIDE 50 MG/1
25 TABLET ORAL EVERY 12 HOURS PRN
Qty: 30 TABLET | Refills: 2 | Status: SHIPPED | OUTPATIENT
Start: 2023-01-01 | End: 2023-11-02

## 2023-01-01 RX ORDER — TAMSULOSIN HYDROCHLORIDE 0.4 MG/1
0.4 CAPSULE ORAL DAILY
Status: DISCONTINUED | OUTPATIENT
Start: 2023-01-01 | End: 2023-01-01 | Stop reason: HOSPADM

## 2023-01-01 RX ORDER — ACETAMINOPHEN 325 MG/1
650 TABLET ORAL EVERY 4 HOURS PRN
Status: DISCONTINUED | OUTPATIENT
Start: 2023-01-01 | End: 2023-01-01 | Stop reason: HOSPADM

## 2023-01-01 RX ORDER — FINASTERIDE 5 MG/1
5 TABLET, FILM COATED ORAL DAILY
COMMUNITY
End: 2023-01-01

## 2023-01-01 RX ORDER — FUROSEMIDE 20 MG/1
20-40 TABLET ORAL 2 TIMES DAILY
Qty: 120 TABLET | Refills: 11 | Status: SHIPPED | OUTPATIENT
Start: 2023-01-01 | End: 2023-01-01 | Stop reason: SDUPTHER

## 2023-01-01 RX ORDER — PETROLATUM,WHITE/LANOLIN
1 OINTMENT (GRAM) TOPICAL
COMMUNITY
End: 2023-01-01 | Stop reason: SDUPTHER

## 2023-01-01 RX ORDER — DOCUSATE SODIUM 100 MG/1
100 CAPSULE, LIQUID FILLED ORAL 2 TIMES DAILY PRN
Qty: 60 CAPSULE | Refills: 11 | Status: SHIPPED | OUTPATIENT
Start: 2023-01-01 | End: 2023-11-02

## 2023-01-01 RX ORDER — ONDANSETRON 4 MG/1
4 TABLET, FILM COATED ORAL EVERY 8 HOURS PRN
Qty: 20 TABLET | Refills: 0 | Status: SHIPPED | OUTPATIENT
Start: 2023-01-01 | End: 2023-01-01

## 2023-01-01 RX ORDER — BACITRACIN ZINC 500 [USP'U]/G
1 OINTMENT TOPICAL DAILY
COMMUNITY
Start: 2023-01-01 | End: 2023-01-01 | Stop reason: SDUPTHER

## 2023-01-01 RX ORDER — SPIRONOLACTONE 25 MG/1
25 TABLET ORAL EVERY MORNING
Qty: 90 TABLET | Refills: 3 | Status: SHIPPED | OUTPATIENT
Start: 2023-01-01 | End: 2023-11-02

## 2023-01-01 RX ORDER — ALBUTEROL SULFATE 90 UG/1
2 AEROSOL, METERED RESPIRATORY (INHALATION) EVERY 6 HOURS PRN
Qty: 8.5 G | Refills: 11 | Status: SHIPPED | OUTPATIENT
Start: 2023-01-01 | End: 2023-11-02

## 2023-01-01 RX ORDER — TRAZODONE HYDROCHLORIDE 50 MG/1
25 TABLET ORAL NIGHTLY PRN
Qty: 30 TABLET | Refills: 11 | Status: SHIPPED | OUTPATIENT
Start: 2023-01-01 | End: 2023-11-02

## 2023-01-01 RX ORDER — ONDANSETRON 2 MG/ML
8 INJECTION INTRAMUSCULAR; INTRAVENOUS EVERY 8 HOURS PRN
Status: DISCONTINUED | OUTPATIENT
Start: 2023-01-01 | End: 2023-01-01

## 2023-01-01 RX ORDER — POLYETHYLENE GLYCOL 3350 17 G/17G
17 POWDER, FOR SOLUTION ORAL
COMMUNITY
End: 2023-01-01 | Stop reason: SDUPTHER

## 2023-01-01 RX ORDER — LEVETIRACETAM 750 MG/1
1500 TABLET ORAL 2 TIMES DAILY
Qty: 60 TABLET | Refills: 11 | Status: SHIPPED | OUTPATIENT
Start: 2023-01-01 | End: 2023-01-01

## 2023-01-01 RX ORDER — ZINC OXIDE 20 %
1 OINTMENT (GRAM) TOPICAL 3 TIMES DAILY PRN
Qty: 56 G | Refills: 3 | Status: SHIPPED | OUTPATIENT
Start: 2023-01-01 | End: 2023-11-02

## 2023-01-01 RX ORDER — COLLAGENASE SANTYL 250 [ARB'U]/G
OINTMENT TOPICAL
COMMUNITY

## 2023-01-01 RX ORDER — NALOXONE HYDROCHLORIDE 4 MG/.1ML
4 SPRAY NASAL
COMMUNITY
Start: 2022-10-09 | End: 2023-01-01

## 2023-01-01 RX ORDER — CHOLECALCIFEROL (VITAMIN D3) 125 MCG
5 CAPSULE ORAL NIGHTLY PRN
Qty: 30 TABLET | Refills: 11 | Status: SHIPPED | OUTPATIENT
Start: 2023-01-01 | End: 2023-11-02

## 2023-01-01 RX ORDER — FINASTERIDE 5 MG/1
5 TABLET, FILM COATED ORAL DAILY
Status: DISCONTINUED | OUTPATIENT
Start: 2023-01-01 | End: 2023-01-01 | Stop reason: HOSPADM

## 2023-01-01 RX ORDER — ZINC OXIDE 20 %
1 OINTMENT (GRAM) TOPICAL
COMMUNITY
End: 2023-01-01 | Stop reason: SDUPTHER

## 2023-01-01 RX ORDER — ONDANSETRON 4 MG/1
8 TABLET, ORALLY DISINTEGRATING ORAL EVERY 8 HOURS PRN
Status: DISCONTINUED | OUTPATIENT
Start: 2023-01-01 | End: 2023-01-01 | Stop reason: HOSPADM

## 2023-01-01 RX ORDER — BISACODYL 10 MG
10 SUPPOSITORY, RECTAL RECTAL
COMMUNITY
End: 2023-01-01 | Stop reason: SDUPTHER

## 2023-01-01 RX ORDER — BISACODYL 10 MG
10 SUPPOSITORY, RECTAL RECTAL PRN
Qty: 28 SUPPOSITORY | Refills: 3 | Status: SHIPPED | OUTPATIENT
Start: 2023-01-01 | End: 2023-11-02

## 2023-01-01 RX ORDER — TRAZODONE HYDROCHLORIDE 50 MG/1
25 TABLET ORAL
COMMUNITY
End: 2023-01-01 | Stop reason: SDUPTHER

## 2023-01-01 RX ORDER — ONDANSETRON 4 MG/1
1 TABLET, FILM COATED ORAL EVERY 8 HOURS PRN
COMMUNITY
Start: 2022-10-20 | End: 2023-01-01 | Stop reason: SDUPTHER

## 2023-01-01 RX ORDER — DOCUSATE SODIUM 100 MG/1
100 CAPSULE, LIQUID FILLED ORAL EVERY 12 HOURS
Status: DISCONTINUED | OUTPATIENT
Start: 2023-01-01 | End: 2023-01-01 | Stop reason: HOSPADM

## 2023-01-01 RX ORDER — BISACODYL 10 MG
10 SUPPOSITORY, RECTAL RECTAL PRN
Qty: 28 SUPPOSITORY | Refills: 3 | Status: SHIPPED | OUTPATIENT
Start: 2023-01-01 | End: 2023-01-01 | Stop reason: SDUPTHER

## 2023-01-01 RX ORDER — ATROPINE SULFATE 10 MG/ML
2 SOLUTION/ DROPS OPHTHALMIC EVERY 4 HOURS PRN
Status: DISCONTINUED | OUTPATIENT
Start: 2023-01-01 | End: 2023-01-01

## 2023-01-01 RX ORDER — POTASSIUM CHLORIDE 750 MG/1
TABLET, FILM COATED, EXTENDED RELEASE ORAL
Qty: 56 TABLET | Refills: 10 | Status: SHIPPED | OUTPATIENT
Start: 2023-01-01 | End: 2023-11-02

## 2023-01-01 RX ORDER — CLOTRIMAZOLE AND BETAMETHASONE DIPROPIONATE 10; .64 MG/G; MG/G
1 CREAM TOPICAL 2 TIMES DAILY
Qty: 45 G | Refills: 10 | Status: SHIPPED | OUTPATIENT
Start: 2023-01-01 | End: 2023-11-02

## 2023-01-01 RX ORDER — CHOLECALCIFEROL (VITAMIN D3) 125 MCG
5 CAPSULE ORAL
Qty: 28 TABLET | Refills: 10 | Status: SHIPPED | OUTPATIENT
Start: 2023-01-01 | End: 2023-11-02

## 2023-01-01 RX ORDER — CHOLECALCIFEROL (VITAMIN D3) 125 MCG
1 CAPSULE ORAL DAILY
COMMUNITY
End: 2023-01-01 | Stop reason: SDUPTHER

## 2023-01-01 RX ORDER — KETOCONAZOLE 20 MG/G
1 CREAM TOPICAL DAILY
Qty: 30 G | Refills: 1 | Status: SHIPPED | OUTPATIENT
Start: 2023-01-01 | End: 2023-01-01

## 2023-01-01 RX ORDER — POLYETHYLENE GLYCOL 3350 17 G/17G
17 POWDER, FOR SOLUTION ORAL
Qty: 507 G | Refills: 3 | Status: SHIPPED | OUTPATIENT
Start: 2023-01-01 | End: 2023-11-02

## 2023-01-01 RX ORDER — LEVETIRACETAM 750 MG/1
1500 TABLET ORAL 2 TIMES DAILY
COMMUNITY
Start: 2023-01-01 | End: 2023-01-01 | Stop reason: SDUPTHER

## 2023-01-01 RX ORDER — ZINC OXIDE 20 %
OINTMENT (GRAM) TOPICAL
COMMUNITY
End: 2023-01-01

## 2023-01-01 RX ORDER — ACETAMINOPHEN/DIPHENHYDRAMINE 500MG-25MG
1 TABLET ORAL
COMMUNITY
Start: 2022-01-01 | End: 2023-01-01 | Stop reason: SDUPTHER

## 2023-01-01 RX ORDER — LEVETIRACETAM 750 MG/1
TABLET ORAL
Qty: 112 TABLET | Refills: 10 | Status: SHIPPED | OUTPATIENT
Start: 2023-01-01 | End: 2023-11-02

## 2023-01-01 RX ORDER — POTASSIUM CHLORIDE 750 MG/1
10 TABLET, FILM COATED, EXTENDED RELEASE ORAL 2 TIMES DAILY
Qty: 60 TABLET | Refills: 3 | Status: SHIPPED | OUTPATIENT
Start: 2023-01-01 | End: 2023-01-01

## 2023-01-01 RX ORDER — BACITRACIN ZINC 500 [USP'U]/G
1 OINTMENT TOPICAL DAILY
Qty: 28 G | Refills: 0 | Status: SHIPPED | OUTPATIENT
Start: 2023-01-01 | End: 2023-11-02

## 2023-01-01 RX ORDER — DEXTROMETHORPHAN HBR. AND GUAIFENESIN 10; 100 MG/5ML; MG/5ML
10 SOLUTION ORAL EVERY 6 HOURS PRN
Qty: 180 ML | Refills: 3 | Status: SHIPPED | OUTPATIENT
Start: 2023-01-01 | End: 2023-11-02

## 2023-01-01 RX ORDER — HYDROXYZINE HYDROCHLORIDE 25 MG/1
25 TABLET, FILM COATED ORAL
Qty: 90 TABLET | Refills: 3 | Status: SHIPPED | OUTPATIENT
Start: 2023-01-01 | End: 2023-11-02

## 2023-01-01 RX ORDER — HYDROXYZINE 50 MG/1
50 TABLET, FILM COATED ORAL
COMMUNITY
End: 2023-01-01

## 2023-01-01 RX ORDER — CEPHALEXIN 500 MG/1
500 CAPSULE ORAL 3 TIMES DAILY
Qty: 30 CAPSULE | Refills: 0 | Status: SHIPPED | OUTPATIENT
Start: 2023-01-01 | End: 2023-01-01

## 2023-01-01 RX ORDER — FUROSEMIDE 20 MG/1
20-40 TABLET ORAL EVERY MORNING
Qty: 180 TABLET | Refills: 0 | Status: SHIPPED | OUTPATIENT
Start: 2023-01-01 | End: 2023-01-01

## 2023-01-01 RX ORDER — MINOCYCLINE HYDROCHLORIDE 100 MG/1
100 CAPSULE ORAL 2 TIMES DAILY
Qty: 60 CAPSULE | Refills: 1 | Status: SHIPPED | OUTPATIENT
Start: 2023-01-01 | End: 2023-01-01 | Stop reason: SDUPTHER

## 2023-01-01 RX ORDER — BALSAM PERU/CASTOR OIL
OINTMENT (GRAM) TOPICAL
COMMUNITY

## 2023-01-01 RX ORDER — DEXTROMETHORPHAN HBR. AND GUAIFENESIN 10; 100 MG/5ML; MG/5ML
10 SOLUTION ORAL
COMMUNITY
End: 2023-01-01 | Stop reason: SDUPTHER

## 2023-01-01 RX ORDER — TAMSULOSIN HYDROCHLORIDE 0.4 MG/1
0.4 CAPSULE ORAL DAILY
Qty: 90 CAPSULE | Refills: 3 | Status: SHIPPED | OUTPATIENT
Start: 2023-01-01 | End: 2023-01-01

## 2023-01-01 RX ORDER — MINOCYCLINE HYDROCHLORIDE 100 MG/1
100 CAPSULE ORAL
COMMUNITY
Start: 2023-01-01 | End: 2023-01-01 | Stop reason: SDUPTHER

## 2023-01-01 RX ORDER — ACETAMINOPHEN 650 MG/1
650 SUPPOSITORY RECTAL EVERY 4 HOURS PRN
Status: DISCONTINUED | OUTPATIENT
Start: 2023-01-01 | End: 2023-01-01 | Stop reason: HOSPADM

## 2023-01-01 RX ADMIN — PROTHROMBIN, COAGULATION FACTOR VII HUMAN, COAGULATION FACTOR IX HUMAN, COAGULATION FACTOR X HUMAN, PROTEIN C, PROTEIN S HUMAN, AND WATER 3500 UNITS: KIT at 22:15

## 2023-01-01 RX ADMIN — MORPHINE SULFATE 5 MG: 10 INJECTION INTRAVENOUS at 22:28

## 2023-01-01 RX ADMIN — ONDANSETRON 8 MG: 2 INJECTION INTRAMUSCULAR; INTRAVENOUS at 22:27

## 2023-01-01 RX ADMIN — MORPHINE SULFATE 10 MG: 10 INJECTION INTRAVENOUS at 02:17

## 2023-01-01 RX ADMIN — MORPHINE SULFATE 5 MG: 10 INJECTION INTRAVENOUS at 18:26

## 2023-01-01 RX ADMIN — MORPHINE SULFATE 5 MG: 10 INJECTION INTRAVENOUS at 23:37

## 2023-01-01 RX ADMIN — TAMSULOSIN HYDROCHLORIDE 0.4 MG: 0.4 CAPSULE ORAL at 18:25

## 2023-01-01 RX ADMIN — MORPHINE SULFATE 10 MG: 10 INJECTION INTRAVENOUS at 00:33

## 2023-01-01 RX ADMIN — IOHEXOL 96 ML: 350 INJECTION, SOLUTION INTRAVENOUS at 20:45

## 2023-01-01 RX ADMIN — FINASTERIDE 5 MG: 5 TABLET, FILM COATED ORAL at 18:25

## 2023-01-01 RX ADMIN — MORPHINE SULFATE 5 MG: 10 INJECTION INTRAVENOUS at 11:29

## 2023-01-01 ASSESSMENT — ENCOUNTER SYMPTOMS
WEAKNESS: 1
FALLS: 0
DOUBLE VISION: 0
CHILLS: 0
COUGH: 0
HEADACHES: 1
VOMITING: 0
BLURRED VISION: 0
DIZZINESS: 0
MYALGIAS: 1
CHILLS: 0
BRUISES/BLEEDS EASILY: 1
COUGH: 0
DEPRESSION: 0
DIZZINESS: 0
VOMITING: 0
NAUSEA: 1
HEARTBURN: 0
COUGH: 0
MYALGIAS: 1
ABDOMINAL PAIN: 0
NAUSEA: 1
DOUBLE VISION: 0
SHORTNESS OF BREATH: 0
SHORTNESS OF BREATH: 0
PALPITATIONS: 0
HEARTBURN: 0
BLURRED VISION: 0
HEADACHES: 0
PALPITATIONS: 0
FALLS: 1
FEVER: 0
SEIZURES: 0
BRUISES/BLEEDS EASILY: 0
BLURRED VISION: 0
FEVER: 0
DEPRESSION: 0
CHILLS: 0
FALLS: 1
WEAKNESS: 1
SHORTNESS OF BREATH: 0
BRUISES/BLEEDS EASILY: 1
FEVER: 0
HEADACHES: 1

## 2023-01-01 ASSESSMENT — FIBROSIS 4 INDEX
FIB4 SCORE: 1.33
FIB4 SCORE: 1.33
FIB4 SCORE: 2.3
FIB4 SCORE: 1.33
FIB4 SCORE: 1.33
FIB4 SCORE: 1.34
FIB4 SCORE: 1.34
FIB4 SCORE: 3.52
FIB4 SCORE: 2.3
FIB4 SCORE: 1.34
FIB4 SCORE: 1.34

## 2023-01-01 ASSESSMENT — PAIN DESCRIPTION - PAIN TYPE
TYPE: ACUTE PAIN

## 2023-01-01 ASSESSMENT — LIFESTYLE VARIABLES
ON A TYPICAL DAY WHEN YOU DRINK ALCOHOL HOW MANY DRINKS DO YOU HAVE: 0
ALCOHOL_USE: NO
AVERAGE NUMBER OF DAYS PER WEEK YOU HAVE A DRINK CONTAINING ALCOHOL: 0
TOTAL SCORE: 0
EVER HAD A DRINK FIRST THING IN THE MORNING TO STEADY YOUR NERVES TO GET RID OF A HANGOVER: NO
HOW MANY TIMES IN THE PAST YEAR HAVE YOU HAD 5 OR MORE DRINKS IN A DAY: 0
TOTAL SCORE: 0
ON A TYPICAL DAY WHEN YOU DRINK ALCOHOL HOW MANY DRINKS DO YOU HAVE: 0
HAVE PEOPLE ANNOYED YOU BY CRITICIZING YOUR DRINKING: NO
EVER FELT BAD OR GUILTY ABOUT YOUR DRINKING: NO
DOES PATIENT WANT TO STOP DRINKING: NO
HAVE YOU EVER FELT YOU SHOULD CUT DOWN ON YOUR DRINKING: NO
SUBSTANCE_ABUSE: 0
TOTAL SCORE: 0
CONSUMPTION TOTAL: NEGATIVE
TOTAL SCORE: 0
DO YOU DRINK ALCOHOL: NO
AVERAGE NUMBER OF DAYS PER WEEK YOU HAVE A DRINK CONTAINING ALCOHOL: 0
CONSUMPTION TOTAL: NEGATIVE
HOW MANY TIMES IN THE PAST YEAR HAVE YOU HAD 5 OR MORE DRINKS IN A DAY: 0
TOTAL SCORE: 0
HAVE PEOPLE ANNOYED YOU BY CRITICIZING YOUR DRINKING: NO
SUBSTANCE_ABUSE: 0
EVER HAD A DRINK FIRST THING IN THE MORNING TO STEADY YOUR NERVES TO GET RID OF A HANGOVER: NO
EVER FELT BAD OR GUILTY ABOUT YOUR DRINKING: NO
TOTAL SCORE: 0
HAVE YOU EVER FELT YOU SHOULD CUT DOWN ON YOUR DRINKING: NO

## 2023-01-01 ASSESSMENT — PATIENT HEALTH QUESTIONNAIRE - PHQ9
SUM OF ALL RESPONSES TO PHQ9 QUESTIONS 1 AND 2: 0
CLINICAL INTERPRETATION OF PHQ2 SCORE: 0
2. FEELING DOWN, DEPRESSED, IRRITABLE, OR HOPELESS: NOT AT ALL
1. LITTLE INTEREST OR PLEASURE IN DOING THINGS: NOT AT ALL
SUM OF ALL RESPONSES TO PHQ9 QUESTIONS 1 AND 2: 0
1. LITTLE INTEREST OR PLEASURE IN DOING THINGS: NOT AT ALL
2. FEELING DOWN, DEPRESSED, IRRITABLE, OR HOPELESS: NOT AT ALL

## 2023-01-12 NOTE — PROGRESS NOTES
CHW Leonid was going to see the pt in office when he came to his PCP appointment. Pt was a no show so this CHW tried to call the pt and the pt did not answer with a VM that has not been sent up and could not leave a message. This CHW will put the pt on the follow up call list for 1 week out. This CHW also sent the pt a [employer] message introducing the program.

## 2023-01-26 PROBLEM — L98.9 SKIN LESION OF CHEEK: Status: RESOLVED | Noted: 2022-09-26 | Resolved: 2023-01-01

## 2023-01-26 PROBLEM — R56.9 SEIZURE (HCC): Status: ACTIVE | Noted: 2022-10-14

## 2023-01-26 PROBLEM — F13.20: Chronic | Status: RESOLVED | Noted: 2021-05-28 | Resolved: 2023-01-01

## 2023-01-26 PROBLEM — S09.90XA CLOSED HEAD INJURY: Status: RESOLVED | Noted: 2022-09-28 | Resolved: 2023-01-01

## 2023-01-26 PROBLEM — R56.9 SEIZURE (HCC): Chronic | Status: ACTIVE | Noted: 2022-10-14

## 2023-01-26 PROBLEM — T81.49XA WOUND INFECTION AFTER SURGERY: Status: ACTIVE | Noted: 2022-09-28

## 2023-01-26 PROBLEM — L98.9 SCALP LESION: Status: RESOLVED | Noted: 2019-11-04 | Resolved: 2023-01-01

## 2023-01-26 NOTE — ASSESSMENT & PLAN NOTE
Heart rate of 62 today  Patient not on rater or rhythm control, BP is soft but appropriate and he is a fall risk  Patient is on OAC Eliquis 2.5 mg BID, denies any concerning bleeding

## 2023-01-26 NOTE — ASSESSMENT & PLAN NOTE
Patient while being taken care of at [facility] and assisted living had more than one seizure. He has been started on Keppra 750 mg BID, will continue with this. He does not think he has had any further seizures.

## 2023-01-26 NOTE — PROGRESS NOTES
"Subjective:     CC: \"hospital follow up\"    HPI:   [place] presents today with:    Was at [facility] for 4 months September through January thankfully getting his forehead repaired  Last meeting with surgeon was on 1/12/23 with planned follow up with patient in 2-4 weeks, patient is unsure how he will manage that from here  He has a son that lives in [place] but living with him is not an option  Came back to [place] on Sunday, is back at his assisted living  No further seizures that he knows of  Feet bilaterall have been swollen for last 2 days, no shortness of breath or chest pain  Has constipation occasionally but feels well controlled  Patient complains of infection on fingers over the last week  Patient complains of rash in his groin over the last few weeks, uncertain how long it has been there    Per surgical note 1/12/23  \"Plan  - Patient healing well   - Small area of left wound breakdown. This was cleaned and dressed by Dr. Soto. Gave notes to transport team from living facility and will have them continue with bacitracin once daily. Gave Angeles Lopez's number and advised to call or MyChart us with any questions  - Dr. Soto will call patients son to update on progress   --Continue ABX plan per ID  - Patient to follow-up for wound check in 2 weeks\"    Per ID Note 12/13/2022  \"Tx:   --Continue Vancomycin and Ertapenem through 12/20/22  --After 12/20, then STOP IV antibiotics and START Minocycline 200MG PO x1, then 100MG PO q12H  - Minocycline should not be taken with multivitamins or minerals to avoid malabsorption.  - Will discuss potential sedating effect with Paolo. Will need to monitor minocycline while he remains on Keppra   --After completion of IV therapy on 12/20, REMOVE PICC line.   --Duration of PO Abx: Indefinite life long suppression   --Follow up: 3 month follow up  --Will call Paolo (patient's son to inform him of the antibiotic de-escalation plan).\"    Problem   Seizure (Hcc)   Wound Infection " After Surgery   Skin Ulcer of Scalp (Hcc)   Chronic Congestive Heart Failure (Hcc)   Chronic Atrial Fibrillation (Hcc)   Closed Head Injury (Resolved)   Skin Lesion of Cheek (Resolved)   Valium Use Disorder, Moderate, in Controlled Environment, Dependence (Hcc) (Resolved)   Scalp Lesion (Resolved)       Current Outpatient Medications Ordered in Epic   Medication Sig Dispense Refill    collagenase (SANTYL) ointment Apply  topically.      Balsam [place]-Castor Oil (DERMULCERA) Ointment Apply  topically.      bacitracin 500 UNIT/GM Ointment Apply 1 Application topically every day.      acetaminophen (TYLENOL) 500 MG Tab Take 500-1,000 mg by mouth every 8 hours as needed.      furosemide (LASIX) 20 MG Tab Take 1-2 Tablets by mouth 2 times a day. If worsening swelling take 2 tablets, if swelling is ok just take 1 tablet 120 Tablet 11    levetiracetam (KEPPRA) 750 MG tablet Take 2 Tablets by mouth 2 times a day. 60 Tablet 11    albuterol 108 (90 Base) MCG/ACT Aero Soln inhalation aerosol Inhale 2 Puffs every 6 hours as needed for Shortness of Breath. 8.5 g 11    docusate sodium (COLACE) 100 MG Cap Take 1 Capsule by mouth 2 times a day as needed for Constipation. 60 Capsule 11    apixaban (ELIQUIS) 2.5mg Tab Take 1 Tablet by mouth 2 times a day. 60 Tablet 10    finasteride (PROSCAR) 5 MG Tab Take 1 Tablet by mouth every day. 90 Tablet 3    hydrOXYzine HCl (ATARAX) 25 MG Tab Take 1 Tablet by mouth at bedtime as needed for Itching. 90 Tablet 3    bisacodyl (DULCOLAX) 10 MG Suppos Insert 1 Suppository into the rectum as needed (constipation). 28 Suppository 3    melatonin 5 mg Tab Take 1 Tablet by mouth at bedtime as needed (Insomnia). 30 Tablet 11    melatonin 5 mg Tab Take 1 Tablet by mouth at bedtime. 28 Tablet 10    minocycline (MINOCIN) 100 MG Cap Take 1 Capsule by mouth 2 times a day. 60 Capsule 1    sodium phosphate (READY-TO-USE) 7-19 GM/118ML Enema Insert 1 Enema into the rectum as needed (constipation). 133 mL 11     polyethylene glycol 3350 (MIRALAX) 17 GM/SCOOP Powder Take 17 g by mouth 1 time a day as needed (constipation). 507 g 3    diphenhydrAMINE-APAP, sleep, (TYLENOL PM EXTRA STRENGTH)  MG Tab Take 1 Tablet by mouth at bedtime as needed (insominia, pain). 14 Tablet 11    guaifenesin dextromethorphan sugar free (DIABETIC TUSSIN DM)  MG/5ML Liquid soln Take 10 mL by mouth every 6 hours as needed for Cough. 180 mL 3    ondansetron (ZOFRAN) 4 MG Tab tablet Take 1 Tablet by mouth every 8 hours as needed for Nausea/Vomiting for up to 30 days. 20 Tablet 0    traZODone (DESYREL) 50 MG Tab Take 0.5 Tablets by mouth at bedtime as needed for Sleep. 30 Tablet 11    vitamin A & D (A&D OINTMENT) Ointment Apply 1 Application topically every day. 113 g 11    zinc oxide oint (ZINC OXIDE OINTMENT) 20 % Ointment Apply 1 Application topically 3 times a day as needed (buttock skin break down). 56 g 3    tamsulosin (FLOMAX) 0.4 MG capsule Take 1 Capsule by mouth every day. 90 Capsule 3    simvastatin (ZOCOR) 10 MG Tab Take 1 Tablet by mouth every evening. 90 Tablet 3    cephALEXin (KEFLEX) 500 MG Cap Take 1 Capsule by mouth 3 times a day for 10 days. For skin infection on fingers 30 Capsule 0    nystatin (MYCOSTATIN) 145345 UNIT/GM Cream topical cream Apply 1 g topically 2 times a day. For rash in groin 30 g 1    Elastic Bandages & Supports (MEDICAL COMPRESSION STOCKINGS) Misc 1 Application 1 time a day as needed (swelling in legs). 2 Each 1    chlorhexidine (PERIDEX) 0.12 % Solution Take 15 mL by mouth every day. 473 mL 1    acetaminophen (TYLENOL) 325 MG Tab Take 2 Tablets by mouth every 6 hours as needed for Mild Pain.       No current Epic-ordered facility-administered medications on file.       Health Maintenance: unable to address today    ROS:  Review of Systems   Constitutional:  Negative for chills and fever.   Eyes:  Negative for blurred vision.   Respiratory:  Negative for cough and shortness of breath.   "  Cardiovascular:  Positive for leg swelling. Negative for chest pain.   Gastrointestinal:  Negative for abdominal pain.   Genitourinary:  Negative for frequency.   Musculoskeletal:  Negative for falls.   Skin:  Positive for rash.   Neurological:  Negative for seizures and headaches.   Endo/Heme/Allergies:  Does not bruise/bleed easily.     Objective:     Exam:  /58 (BP Location: Right arm, Patient Position: Sitting, BP Cuff Size: Adult)   Pulse 62   Temp 36.5 °C (97.7 °F) (Temporal)   Ht 1.549 m (5' 1\")   Wt 60.2 kg (132 lb 12.8 oz)   SpO2 98%   BMI 25.09 kg/m²  Body mass index is 25.09 kg/m².    Physical Exam  Vitals reviewed.   Constitutional:       General: He is not in acute distress.     Appearance: He is normal weight. He is not toxic-appearing.      Comments: Patient is a little sleepy in the room   HENT:      Head:      Comments: Skin flap on forehead does appear surgical incisions are well healed, not raised, no surrounding erythema, no drainage, The flap itself is pale compared to surrounding skin, it is cool to touch, it has some edema on right side without erythema or eccymosis  Pulmonary:      Effort: No respiratory distress.   Musculoskeletal:      Right lower leg: Edema present.      Left lower leg: Edema present.      Comments: +2 edema   Skin:     Findings: Erythema (tips of fingers adjacent to nail with erythema, no drainage) and rash (erythematous rash in groin b/l) present.   Neurological:      Mental Status: Mental status is at baseline.      Gait: Gait abnormal (able to stand but uses wheel chair in clinic for even marginal distance).   Psychiatric:         Mood and Affect: Mood normal.         Behavior: Behavior normal.         Assessment & Plan:     87 y.o. male with the following -     Problem List Items Addressed This Visit       Chronic congestive heart failure (HCC) (Chronic)     Patient with lower extremity edema, this has been a problem before. No recent echocardiogram in " Epic. Patient has had Lasix prescribed previously, will make available as 20-40 mg BID depending on his edema. Appears he has had compression stocking in the past will try to get him a new pair as well. Referral to cardiology. At next appointment will need to set up follow up labs and imaging.         Relevant Medications    furosemide (LASIX) 20 MG Tab    apixaban (ELIQUIS) 2.5mg Tab    simvastatin (ZOCOR) 10 MG Tab    Other Relevant Orders    REFERRAL TO CARDIOLOGY    REFERRAL TO CARE MANAGEMENT    Chronic atrial fibrillation (HCC) (Chronic)     Heart rate of 62 today  Patient not on rater or rhythm control, BP is soft but appropriate and he is a fall risk  Patient is on OAC Eliquis 2.5 mg BID, denies any concerning bleeding         Relevant Medications    furosemide (LASIX) 20 MG Tab    apixaban (ELIQUIS) 2.5mg Tab    simvastatin (ZOCOR) 10 MG Tab    Other Relevant Orders    REFERRAL TO CARDIOLOGY    Skin ulcer of scalp (HCC) (Chronic)     Appears to be closed and healing well. There is some fluid accumulation on the right side. No discharge or signs of infection. Skin flap is cool to touch. Will need to help patient navigate follow up with his surgical team.         Relevant Orders    REFERRAL TO CARE MANAGEMENT    Seizure (HCC) (Chronic)     Patient while being taken care of at [facility] and assisted living had more than one seizure. He has been started on Keppra 750 mg BID, will continue with this. He does not think he has had any further seizures.         Relevant Medications    levetiracetam (KEPPRA) 750 MG tablet    diphenhydrAMINE-APAP, sleep, (TYLENOL PM EXTRA STRENGTH)  MG Tab    Other Relevant Orders    Referral to Neurology    REFERRAL TO CARE MANAGEMENT    History of DVT of lower extremity    Relevant Medications    apixaban (ELIQUIS) 2.5mg Tab    Wound infection after surgery     Per ID note plan to continue minocycline for suppressive therapy          Other Visit Diagnoses       Yeast infection  of the skin      Erythematous rash in groin appears consistent with yeast infection, will start with nystatin and see if improvement next week at appointment    Paronychia of finger, unspecified laterality      Bilateral and multiple fingers  Will initiate treatment with keflex  Reassess next week            Return in about 1 week (around 2/2/2023) for Medication F/U.    Please note that this dictation was created using voice recognition software. I have made every reasonable attempt to correct obvious errors, but I expect that there are errors of grammar and possibly content that I did not discover before finalizing the note.

## 2023-01-26 NOTE — ASSESSMENT & PLAN NOTE
Patient with lower extremity edema, this has been a problem before. No recent echocardiogram in Epic. Patient has had Lasix prescribed previously, will make available as 20-40 mg BID depending on his edema. Appears he has had compression stocking in the past will try to get him a new pair as well. Referral to cardiology. At next appointment will need to set up follow up labs and imaging.

## 2023-01-26 NOTE — ASSESSMENT & PLAN NOTE
Appears to be closed and healing well. There is some fluid accumulation on the right side. No discharge or signs of infection. Skin flap is cool to touch. Will need to help patient navigate follow up with his surgical team.

## 2023-01-27 NOTE — PROGRESS NOTES
CHW Leonid called the pt again to introduce the CCM program. Pt did not answer and this CHW could not leave a VM. This CHW will follow up again next week.

## 2023-02-02 PROBLEM — Z94.5 STATUS POST SKIN FLAP GRAFT: Status: ACTIVE | Noted: 2022-02-18

## 2023-02-02 PROBLEM — Z94.5 STATUS POST SKIN FLAP GRAFT: Chronic | Status: ACTIVE | Noted: 2022-02-18

## 2023-02-02 PROBLEM — L03.019: Status: ACTIVE | Noted: 2023-01-01

## 2023-02-02 PROBLEM — S41.111A SKIN TEAR OF RIGHT UPPER EXTREMITY: Chronic | Status: RESOLVED | Noted: 2021-11-05 | Resolved: 2023-01-01

## 2023-02-02 NOTE — PROGRESS NOTES
"Subjective:     CC: \"medication management\"    HPI:   Chino presents today with:    Resides at the [facility]  [phone]   Left message for Pablo about telehealth to surgeon at [facility]  RN offered to assist me with this but he is due for follow up with his surgical team but does not have a way to go back to [facility]    Patient believes he is taking medication as prescribed but does not really know what they are giving him    He bring in note from residence requesting that he get measured for compression socks  He reports his swelling is about the same  He complains that he has been up all night urinating  There is no pain with urination  He has his walker today, denies recent falls    He tells me his skin flap feels numb this is not a new sensation  He denies recent seizures  He denies fevers or chills    He has been applying nystatin to his groin, rash is less itchy but still present  He thinks he has a ulcer on his bottom again    Problem   Paronychia, Finger, Unspecified Laterality   Decubitus Ulcer of Left Buttock   Status Post Skin Flap Graft   Chronic Congestive Heart Failure (Hcc)   Skin Tear of Right Upper Extremity (Resolved)       Current Outpatient Medications Ordered in Epic   Medication Sig Dispense Refill    furosemide (LASIX) 20 MG Tab Take 1-2 Tablets by mouth every morning. If worsening swelling take 2 tablets, if swelling is ok just take 1 tablet 180 Tablet 0    bacitracin 500 UNIT/GM Ointment Apply 1 Each topically every day. Apply to fingers daily for infection around finger nails 28 g 0    Elastic Bandages & Supports (MEDICAL COMPRESSION STOCKINGS) Misc 1 Application 1 time a day as needed (swelling in legs). 2 Each 1    ketoconazole (NIZORAL) 2 % Cream Apply 1 Application topically every day. Apply to groin 30 g 1    sodium phosphate (READY-TO-USE) 7-19 GM/118ML Enema Insert 1 Enema into the rectum as needed (constipation). 133 mL 11    bisacodyl (DULCOLAX) 10 MG Suppos Insert 1 Suppository " into the rectum as needed (constipation). 28 Suppository 3    collagenase (SANTYL) ointment Apply  topically.      Balsam [place]-Castor Oil (DERMULCERA) Ointment Apply  topically.      acetaminophen (TYLENOL) 500 MG Tab Take 500-1,000 mg by mouth every 8 hours as needed.      levetiracetam (KEPPRA) 750 MG tablet Take 2 Tablets by mouth 2 times a day. 60 Tablet 11    albuterol 108 (90 Base) MCG/ACT Aero Soln inhalation aerosol Inhale 2 Puffs every 6 hours as needed for Shortness of Breath. 8.5 g 11    docusate sodium (COLACE) 100 MG Cap Take 1 Capsule by mouth 2 times a day as needed for Constipation. 60 Capsule 11    apixaban (ELIQUIS) 2.5mg Tab Take 1 Tablet by mouth 2 times a day. 60 Tablet 10    finasteride (PROSCAR) 5 MG Tab Take 1 Tablet by mouth every day. 90 Tablet 3    hydrOXYzine HCl (ATARAX) 25 MG Tab Take 1 Tablet by mouth at bedtime as needed for Itching. 90 Tablet 3    melatonin 5 mg Tab Take 1 Tablet by mouth at bedtime as needed (Insomnia). 30 Tablet 11    melatonin 5 mg Tab Take 1 Tablet by mouth at bedtime. 28 Tablet 10    minocycline (MINOCIN) 100 MG Cap Take 1 Capsule by mouth 2 times a day. 60 Capsule 1    polyethylene glycol 3350 (MIRALAX) 17 GM/SCOOP Powder Take 17 g by mouth 1 time a day as needed (constipation). 507 g 3    diphenhydrAMINE-APAP, sleep, (TYLENOL PM EXTRA STRENGTH)  MG Tab Take 1 Tablet by mouth at bedtime as needed (insominia, pain). 14 Tablet 11    guaifenesin dextromethorphan sugar free (DIABETIC TUSSIN DM)  MG/5ML Liquid soln Take 10 mL by mouth every 6 hours as needed for Cough. 180 mL 3    ondansetron (ZOFRAN) 4 MG Tab tablet Take 1 Tablet by mouth every 8 hours as needed for Nausea/Vomiting for up to 30 days. 20 Tablet 0    traZODone (DESYREL) 50 MG Tab Take 0.5 Tablets by mouth at bedtime as needed for Sleep. 30 Tablet 11    vitamin A & D (A&D OINTMENT) Ointment Apply 1 Application topically every day. 113 g 11    zinc oxide oint (ZINC OXIDE OINTMENT) 20 %  "Ointment Apply 1 Application topically 3 times a day as needed (buttock skin break down). 56 g 3    tamsulosin (FLOMAX) 0.4 MG capsule Take 1 Capsule by mouth every day. 90 Capsule 3    simvastatin (ZOCOR) 10 MG Tab Take 1 Tablet by mouth every evening. 90 Tablet 3    cephALEXin (KEFLEX) 500 MG Cap Take 1 Capsule by mouth 3 times a day for 10 days. For skin infection on fingers 30 Capsule 0    chlorhexidine (PERIDEX) 0.12 % Solution Take 15 mL by mouth every day. 473 mL 1    acetaminophen (TYLENOL) 325 MG Tab Take 2 Tablets by mouth every 6 hours as needed for Mild Pain.       No current Epic-ordered facility-administered medications on file.       Health Maintenance: acute visit    ROS:  ROS see HPI    Objective:     Exam:  /68   Pulse 77   Temp 36.4 °C (97.5 °F) (Temporal)   Ht 1.549 m (5' 1\")   Wt 60.7 kg (133 lb 12.8 oz)   SpO2 97%   BMI 25.28 kg/m²  Body mass index is 25.28 kg/m².    Physical Exam  Vitals reviewed.   Constitutional:       General: He is not in acute distress.     Appearance: Normal appearance.   HENT:      Head:      Comments: Skin flap with some underlying fluid, incision is well healed, it is warm and similar temperature to surrounding skin, no erythema, not painful, no drainage or discharge  Cardiovascular:      Rate and Rhythm: Normal rate and regular rhythm.      Heart sounds: Normal heart sounds.   Pulmonary:      Effort: Pulmonary effort is normal. No respiratory distress.      Breath sounds: Normal breath sounds.   Musculoskeletal:      Comments: Left Leg:  Ankle circumference 27 cm  Calf circumference 35 cm  Calf length 44 cm    Right leg:  Ankle circumference 28 cm  Calf Circumference 35.5 cm  Calf length 44 cm   Skin:     General: Skin is dry.      Findings: Lesion (small superficial ulcer on medial left gluteal cheek) and rash (erythematous rash in groin shows signs of improvement but still prominent on inside of both legs, less infected on penis and scrotum) present. "   Neurological:      Mental Status: He is alert.      Gait: Gait abnormal (stooped and uses walker).   Psychiatric:         Mood and Affect: Mood normal.         Behavior: Behavior normal.         Assessment & Plan:     87 y.o. male with the following -     Problem List Items Addressed This Visit       Chronic congestive heart failure (HCC) (Chronic)     Will change lasix to just AM dosing to try to less disruptive to sleep  Measurement for compression stocking done in exam and sent to pharmacy and sent home with him         Relevant Medications    furosemide (LASIX) 20 MG Tab    Status post skin flap graft (Chronic)     Working on logistics with patient, nursing staff here and at facility         Decubitus ulcer of left buttock     Superficial ulcer recurring at prior location  Applied topical antibiotic         Paronychia, finger, unspecified laterality     Problem continues, believe patient is taking minocycline and keflex as prescribed  Will add mupriocin as topical treatment          Other Visit Diagnoses       Bilateral lower extremity edema        Relevant Medications    Elastic Bandages & Supports (MEDICAL COMPRESSION STOCKINGS) Misc            Return in about 2 weeks (around 2/16/2023) for Medication F/U.    Please note that this dictation was created using voice recognition software. I have made every reasonable attempt to correct obvious errors, but I expect that there are errors of grammar and possibly content that I did not discover before finalizing the note.

## 2023-02-02 NOTE — ASSESSMENT & PLAN NOTE
Will change lasix to just AM dosing to try to less disruptive to sleep  Measurement for compression stocking done in exam and sent to pharmacy and sent home with him

## 2023-02-02 NOTE — ASSESSMENT & PLAN NOTE
Problem continues, believe patient is taking minocycline and keflex as prescribed  Will add mupriocin as topical treatment

## 2023-02-03 NOTE — TELEPHONE ENCOUNTER
Patient's provider Dr. Truong asked this RN to assist with scheduling an appointment with Dr. Mando Soto's office for a post op follow up. Per note and patient, we are able to speak and schedule with Pablo at the [facility].     This RN reached out to [facility] to get an appointment scheduled for patient. Per RN with Dr. Soto's office he has an appointment scheduled for 02/09/2023 @ 4:10pm. Per note from Dr. Matias, Pablo would prefer an appointment time from 10-3. This RN asked if they had any sooner appointment times sometime next week. This RN was then transferred over to the  at [phone]. They did not have any earlier appointments for patient. This RN did not want to push out appointment without patient consent.     This RN then reached out to Pablo to update. Per , Pablo is not currently in and this RN left a message with the  to have Pablo return her call.     1131: Pablo called this RN back. This RN relayed all information above. Pablo stated that 02/09/2022 @ 4:10pm would work and they can use her office for the zoom meeting. This RN to reach out to patient to relay information and to ensure that he has his MyChart set up.     1332: This RN tried to reach out to patient using both telephone numbers in the chart, but there was no answer. This RN then reached out to patient's son Paolo to inform him of the plan for Chino's appointment. Per Paolo, he is already aware of the appointment on 02/09/2023 as he was the one to schedule it. He has a way of connecting his father's computer to the Zoom call and he will take responsibility of the appointment. He then asked this RN to switch patient's appointment from the 02/22 to 02/23 due to transportation. Appointment was switched. This RN then attempted to reach out to Pablo to inform her that Paolo will be taking over. She is currently at lunch, so this RN left a message with the .

## 2023-02-27 PROBLEM — B37.89 CANDIDA RASH OF GROIN: Status: ACTIVE | Noted: 2023-01-01

## 2023-02-27 PROBLEM — D68.69 SECONDARY HYPERCOAGULABLE STATE (HCC): Chronic | Status: ACTIVE | Noted: 2023-01-01

## 2023-02-27 PROBLEM — D68.69 SECONDARY HYPERCOAGULABLE STATE (HCC): Status: ACTIVE | Noted: 2023-01-01

## 2023-02-27 NOTE — ASSESSMENT & PLAN NOTE
Leg edema appears stable, lungs clear with no shortness of breath  Continue with lasix 20-40 mg daily  Printed order for compression stockings

## 2023-02-27 NOTE — ASSESSMENT & PLAN NOTE
Patient on eliquis 2.5 mg bid due to A. Fib  Hand discoloration appears to be chronic bruising  Discussed it will take time for body to reabsorb  Recommend moisturizer on dry skin

## 2023-02-27 NOTE — ASSESSMENT & PLAN NOTE
Improved, patient to continue to use ketoconazole on inner thighs until discoloration has cleared up.

## 2023-02-27 NOTE — ASSESSMENT & PLAN NOTE
Appears stable, patient on long term PO antibiotics, discussed using antibiotic ointment on fingers as well even though he does not like it

## 2023-02-27 NOTE — PROGRESS NOTES
"Subjective:     CC: \"hands are black\"    HPI:   Chino presents today with:    Hand discoloration  Showed up a couple of weeks ago  No pain  No falls or trauma that he is aware of  No change in  or sensation  Seems to be staying the same    Ulcer on buttock  Not sure if its getting worse  Hurts with sitting  Nursing staff is not aware of it    Leg edema  Pharmacy did not receive the prescription for compression stockings  Reports edema is stable  Breathing feels fine    Skin flap  Saw surgeon via Zoom, everything looking good  They have 3 month follow up    Groin rash  No longer itching    Finger paronychia  Not having pain or swelling, does not use ointment as often as he does not like how greasy it is      Problem   Candida Rash of Groin   Secondary Hypercoagulable State (Hcc)   Paronychia, Finger, Unspecified Laterality   Decubitus Ulcer of Left Buttock   Status Post Skin Flap Graft   Chronic Congestive Heart Failure (Hcc)       Current Outpatient Medications Ordered in Epic   Medication Sig Dispense Refill    Elastic Bandages & Supports (MEDICAL COMPRESSION STOCKINGS) Misc 1 Application 1 time a day as needed (swelling in legs). 2 Each 1    minocycline (MINOCIN) 100 MG Cap Take 1 Capsule by mouth 2 times a day. 60 Capsule 11    NON SPECIFIED Apply 1 Units topically every day. I donut shaped pillow to sit on to off load pressure ulcer 1 Each 0    furosemide (LASIX) 20 MG Tab Take 1-2 Tablets by mouth every morning. If worsening swelling take 2 tablets, if swelling is ok just take 1 tablet 180 Tablet 0    bacitracin 500 UNIT/GM Ointment Apply 1 Each topically every day. Apply to fingers daily for infection around finger nails 28 g 0    ketoconazole (NIZORAL) 2 % Cream Apply 1 Application topically every day. Apply to groin 30 g 1    sodium phosphate (READY-TO-USE) 7-19 GM/118ML Enema Insert 1 Enema into the rectum as needed (constipation). 133 mL 11    bisacodyl (DULCOLAX) 10 MG Suppos Insert 1 Suppository " into the rectum as needed (constipation). 28 Suppository 3    collagenase (SANTYL) ointment Apply  topically.      Balsam [place]-Castor Oil (DERMULCERA) Ointment Apply  topically.      acetaminophen (TYLENOL) 500 MG Tab Take 500-1,000 mg by mouth every 8 hours as needed.      levetiracetam (KEPPRA) 750 MG tablet Take 2 Tablets by mouth 2 times a day. 60 Tablet 11    albuterol 108 (90 Base) MCG/ACT Aero Soln inhalation aerosol Inhale 2 Puffs every 6 hours as needed for Shortness of Breath. 8.5 g 11    docusate sodium (COLACE) 100 MG Cap Take 1 Capsule by mouth 2 times a day as needed for Constipation. 60 Capsule 11    apixaban (ELIQUIS) 2.5mg Tab Take 1 Tablet by mouth 2 times a day. 60 Tablet 10    finasteride (PROSCAR) 5 MG Tab Take 1 Tablet by mouth every day. 90 Tablet 3    hydrOXYzine HCl (ATARAX) 25 MG Tab Take 1 Tablet by mouth at bedtime as needed for Itching. 90 Tablet 3    melatonin 5 mg Tab Take 1 Tablet by mouth at bedtime as needed (Insomnia). 30 Tablet 11    melatonin 5 mg Tab Take 1 Tablet by mouth at bedtime. 28 Tablet 10    polyethylene glycol 3350 (MIRALAX) 17 GM/SCOOP Powder Take 17 g by mouth 1 time a day as needed (constipation). 507 g 3    diphenhydrAMINE-APAP, sleep, (TYLENOL PM EXTRA STRENGTH)  MG Tab Take 1 Tablet by mouth at bedtime as needed (insominia, pain). 14 Tablet 11    guaifenesin dextromethorphan sugar free (DIABETIC TUSSIN DM)  MG/5ML Liquid soln Take 10 mL by mouth every 6 hours as needed for Cough. 180 mL 3    traZODone (DESYREL) 50 MG Tab Take 0.5 Tablets by mouth at bedtime as needed for Sleep. 30 Tablet 11    vitamin A & D (A&D OINTMENT) Ointment Apply 1 Application topically every day. 113 g 11    zinc oxide oint (ZINC OXIDE OINTMENT) 20 % Ointment Apply 1 Application topically 3 times a day as needed (buttock skin break down). 56 g 3    tamsulosin (FLOMAX) 0.4 MG capsule Take 1 Capsule by mouth every day. 90 Capsule 3    simvastatin (ZOCOR) 10 MG Tab Take 1 Tablet  "by mouth every evening. 90 Tablet 3    chlorhexidine (PERIDEX) 0.12 % Solution Take 15 mL by mouth every day. 473 mL 1    acetaminophen (TYLENOL) 325 MG Tab Take 2 Tablets by mouth every 6 hours as needed for Mild Pain.       No current Epic-ordered facility-administered medications on file.       Health Maintenance: no vaccinations today    ROS:  ROS see HPI    Objective:     Exam:  /66   Pulse 77   Temp 36.6 °C (97.8 °F) (Temporal)   Ht 1.549 m (5' 1\")   Wt 63 kg (138 lb 12.8 oz)   SpO2 98%   BMI 26.23 kg/m²  Body mass index is 26.23 kg/m².    Physical Exam  Vitals reviewed.   Constitutional:       General: He is not in acute distress.     Appearance: Normal appearance. He is not ill-appearing or toxic-appearing.   HENT:      Head:      Comments: Skin flap is clean, dry and intact with right sided edema that appears slightly improved compared to previous. There is a pencil eraser sized wound on superior right side just above surgical incision site, there is some dried blood present as well. No surrounding erythema, the surrounding skin does not seem hot to touch.  Genitourinary:     Penis: Normal.       Testes: Normal.      Comments: Surrounding erythema no longer on gentiles has taken a darker color on inner thighs    Small pressure ulcer about the size of nickel and has just superficial skin break down.  Musculoskeletal:      Right lower leg: Edema present.      Left lower leg: Edema present.   Skin:     General: Skin is warm and dry.   Neurological:      Mental Status: He is alert. Mental status is at baseline.      Gait: Gait abnormal (small steps, uses walker).         Assessment & Plan:     87 y.o. male with the following -     Problem List Items Addressed This Visit       Chronic congestive heart failure (HCC) (Chronic)     Leg edema appears stable, lungs clear with no shortness of breath  Continue with lasix 20-40 mg daily  Printed order for compression stockings         Status post skin flap " graft (Chronic)     Patient instructed not pick at wound, appears superficial with skin flap in tact, I cleansed surrounding area with alcohol and placed bacitracin ointment on wound. Patient to continue minocycline 100 mg BID long term per ID team in [place].         Secondary hypercoagulable state (HCC) (Chronic)     Patient on eliquis 2.5 mg bid due to A. Fib  Hand discoloration appears to be chronic bruising  Discussed it will take time for body to reabsorb  Recommend moisturizer on dry skin         Decubitus ulcer of left buttock     Appears to have worsened to stage 1 pressure ulcer, discussed getting donut pillow to offload bottom. Placed bacitracin ointment on ulcer.         Relevant Medications    NON SPECIFIED    Paronychia, finger, unspecified laterality     Appears stable, patient on long term PO antibiotics, discussed using antibiotic ointment on fingers as well even though he does not like it         Candida rash of groin     Improved, patient to continue to use ketoconazole on inner thighs until discoloration has cleared up.          Other Visit Diagnoses       Bilateral lower extremity edema        Relevant Medications    Elastic Bandages & Supports (MEDICAL COMPRESSION STOCKINGS) Misc              Return in about 4 weeks (around 3/27/2023) for Medication F/U.    Please note that this dictation was created using voice recognition software. I have made every reasonable attempt to correct obvious errors, but I expect that there are errors of grammar and possibly content that I did not discover before finalizing the note.

## 2023-02-27 NOTE — ASSESSMENT & PLAN NOTE
Patient instructed not pick at wound, appears superficial with skin flap in tact, I cleansed surrounding area with alcohol and placed bacitracin ointment on wound. Patient to continue minocycline 100 mg BID long term per ID team in [place].

## 2023-02-27 NOTE — ASSESSMENT & PLAN NOTE
Appears to have worsened to stage 1 pressure ulcer, discussed getting donut pillow to offload bottom. Placed bacitracin ointment on ulcer.

## 2023-03-07 NOTE — PROGRESS NOTES
CHW Leonid called the pt because he was referred by his provider to see if he had any needs this CHW could help with. This CHW put the pt on the GCM and will continue to follow the pt. Pt stated he had no needs at the moment.

## 2023-03-21 NOTE — PROGRESS NOTES
CHW Leonid called the pt to see how he was doing and to see if he needed anything. Pt stated he was fine and had no needs at this time. This CHW will put the pt on the follow up call list for 3 weeks out to check in with him at that time. Pt has an appointment on the 27th, this CHW will try to see him at his appointment.

## 2023-03-27 NOTE — PROGRESS NOTES
"Subjective:     CC: \"cramps, ulcer, leg swelling\"    HPI:   [place] presents today with:    Cramps  Getting cramps in his hands and feet  Massage helps a little  Feet get cramps when he is standing or walking  No night time cramps    Ulcer  Bottom sore not improving  He reports using donut pillow to offload  He has been using antibiotic ointment    Leg swelling  Has finally got the compressions socks, they are difficult for him to put on but he is managing, not wearing them today  No chest pain or shortness of breath  Endorses urinating all the time  Feels like he is tolerating diuretic well    Rash  Tells me rash in groin is so much better  Does note itchy rash on top of feet, worse on left    No problems updated.    Current Outpatient Medications Ordered in Epic   Medication Sig Dispense Refill    ketoconazole (NIZORAL) 2 % Cream Apply 1 Application. topically every day. Apply to groin rash, foot rash 30 g 1    potassium chloride ER (KLOR-CON) 10 MEQ tablet Take 1 Tablet by mouth 2 times a day. 60 Tablet 3    Magnesium Citrate 200 MG Tab Take 200 mg by mouth every day. 90 Tablet 3    Elastic Bandages & Supports (MEDICAL COMPRESSION STOCKINGS) Misc 1 Application 1 time a day as needed (swelling in legs). 2 Each 1    minocycline (MINOCIN) 100 MG Cap Take 1 Capsule by mouth 2 times a day. 60 Capsule 11    NON SPECIFIED Apply 1 Units topically every day. I donut shaped pillow to sit on to off load pressure ulcer 1 Each 0    furosemide (LASIX) 20 MG Tab Take 1-2 Tablets by mouth every morning. If worsening swelling take 2 tablets, if swelling is ok just take 1 tablet 180 Tablet 0    bacitracin 500 UNIT/GM Ointment Apply 1 Each topically every day. Apply to fingers daily for infection around finger nails 28 g 0    sodium phosphate (READY-TO-USE) 7-19 GM/118ML Enema Insert 1 Enema into the rectum as needed (constipation). 133 mL 11    bisacodyl (DULCOLAX) 10 MG Suppos Insert 1 Suppository into the rectum as needed " (constipation). 28 Suppository 3    collagenase (SANTYL) ointment Apply  topically.      Balsam [place]-Castor Oil (DERMULCERA) Ointment Apply  topically.      acetaminophen (TYLENOL) 500 MG Tab Take 500-1,000 mg by mouth every 8 hours as needed.      levetiracetam (KEPPRA) 750 MG tablet Take 2 Tablets by mouth 2 times a day. 60 Tablet 11    albuterol 108 (90 Base) MCG/ACT Aero Soln inhalation aerosol Inhale 2 Puffs every 6 hours as needed for Shortness of Breath. 8.5 g 11    docusate sodium (COLACE) 100 MG Cap Take 1 Capsule by mouth 2 times a day as needed for Constipation. 60 Capsule 11    apixaban (ELIQUIS) 2.5mg Tab Take 1 Tablet by mouth 2 times a day. 60 Tablet 10    finasteride (PROSCAR) 5 MG Tab Take 1 Tablet by mouth every day. 90 Tablet 3    hydrOXYzine HCl (ATARAX) 25 MG Tab Take 1 Tablet by mouth at bedtime as needed for Itching. 90 Tablet 3    melatonin 5 mg Tab Take 1 Tablet by mouth at bedtime as needed (Insomnia). 30 Tablet 11    melatonin 5 mg Tab Take 1 Tablet by mouth at bedtime. 28 Tablet 10    polyethylene glycol 3350 (MIRALAX) 17 GM/SCOOP Powder Take 17 g by mouth 1 time a day as needed (constipation). 507 g 3    diphenhydrAMINE-APAP, sleep, (TYLENOL PM EXTRA STRENGTH)  MG Tab Take 1 Tablet by mouth at bedtime as needed (insominia, pain). 14 Tablet 11    guaifenesin dextromethorphan sugar free (DIABETIC TUSSIN DM)  MG/5ML Liquid soln Take 10 mL by mouth every 6 hours as needed for Cough. 180 mL 3    traZODone (DESYREL) 50 MG Tab Take 0.5 Tablets by mouth at bedtime as needed for Sleep. 30 Tablet 11    vitamin A & D (A&D OINTMENT) Ointment Apply 1 Application topically every day. 113 g 11    zinc oxide oint (ZINC OXIDE OINTMENT) 20 % Ointment Apply 1 Application topically 3 times a day as needed (buttock skin break down). 56 g 3    tamsulosin (FLOMAX) 0.4 MG capsule Take 1 Capsule by mouth every day. 90 Capsule 3    simvastatin (ZOCOR) 10 MG Tab Take 1 Tablet by mouth every evening. 90  "Tablet 3    chlorhexidine (PERIDEX) 0.12 % Solution Take 15 mL by mouth every day. 473 mL 1    acetaminophen (TYLENOL) 325 MG Tab Take 2 Tablets by mouth every 6 hours as needed for Mild Pain.       No current Epic-ordered facility-administered medications on file.       Health Maintenance: not able to address today    ROS:  ROS see HPI    Objective:     Exam:  /66   Pulse 68   Temp 36.3 °C (97.3 °F) (Temporal)   Ht 1.549 m (5' 1\")   Wt 64.6 kg (142 lb 6.4 oz)   SpO2 95%   BMI 26.91 kg/m²  Body mass index is 26.91 kg/m².    Physical Exam  Vitals reviewed.   Constitutional:       General: He is not in acute distress.     Appearance: Normal appearance.   HENT:      Head:      Comments: Skin flap is clean, dry, intact, does not appear to have any new swelling, not hot, does have healing lesion in lower left corner  Cardiovascular:      Rate and Rhythm: Normal rate. Rhythm irregular.      Heart sounds: Normal heart sounds.   Pulmonary:      Effort: Pulmonary effort is normal.      Breath sounds: Normal breath sounds.   Genitourinary:     Comments: Rash in groin is not apparent     Small scabbed ulcer on left buttock, no surrounding erythema or drainage  Musculoskeletal:      Right lower leg: Edema present.      Left lower leg: Edema present.   Skin:     General: Skin is warm and dry.      Findings: Rash (small rash on top of feet L>R) present.   Neurological:      Mental Status: He is alert.      Gait: Gait abnormal (uses walker).   Psychiatric:         Mood and Affect: Mood normal.         Behavior: Behavior normal.         Assessment & Plan:     87 y.o. male with the following -     Problem List Items Addressed This Visit       Chronic congestive heart failure (HCC) (Chronic)  Edema appears stable, no difficulty breathing, will see if use of diuretics and compression improves his leg edema    Decubitus ulcer of left buttock  Showing improvement, has closed and is scabbed over, no signs of surrounding " infection  Patient to continue to offload with donut shaped pillow    Candida rash of groin  This seems to have improved greatly with ketoconazole, he is feeling good about this     Other Visit Diagnoses       Muscle cramps      Possibly due to diuretic use, will try low dose electrolyte replacement, will discuss lab check at next visit    Relevant Medications    potassium chloride ER (KLOR-CON) 10 MEQ tablet    Magnesium Citrate 200 MG Tab    Rash of foot      Small rash appears consistent with tinea, will try the ketoconazole cream                Return in about 4 weeks (around 4/24/2023) for Medication F/U.    Please note that this dictation was created using voice recognition software. I have made every reasonable attempt to correct obvious errors, but I expect that there are errors of grammar and possibly content that I did not discover before finalizing the note.

## 2023-03-27 NOTE — PATIENT INSTRUCTIONS
For the muscle cramps:  - drink more water, at least 64 ounces a day, maybe more  - tonic water with quinine  - magnesium supplement  - bar of soap in your bed  - in the midst of an attack, use heat     Try using ketoconazole on rash on foot

## 2023-04-24 NOTE — PROGRESS NOTES
"Subjective:     CC: \"skin complaints\"    HPI:   [place] presents today with:    Feeling pretty good overall but feet feel a lot worse  The rash has gotten worse  Putting the cream on and compression socks is a mess but he is managing  Confirms has been using ketoconazole  Notes swelling in legs  Does believe his medication is being dispensed as directed  He wears compression and elevates his legs  No chest pains or shortness of breath  No fevers or headaches    No problems updated.    Current Outpatient Medications Ordered in Epic   Medication Sig Dispense Refill    clotrimazole-betamethasone (LOTRISONE) 1-0.05 % Cream Apply 1 Application. topically 2 times a day. Apply to foot and groin rash 45 g 0    potassium chloride ER (KLOR-CON) 10 MEQ tablet Take 1 Tablet by mouth 2 times a day. 60 Tablet 3    Magnesium Citrate 200 MG Tab Take 200 mg by mouth every day. 90 Tablet 3    Elastic Bandages & Supports (MEDICAL COMPRESSION STOCKINGS) Misc 1 Application 1 time a day as needed (swelling in legs). 2 Each 1    minocycline (MINOCIN) 100 MG Cap Take 1 Capsule by mouth 2 times a day. 60 Capsule 11    NON SPECIFIED Apply 1 Units topically every day. I donut shaped pillow to sit on to off load pressure ulcer 1 Each 0    furosemide (LASIX) 20 MG Tab Take 1-2 Tablets by mouth every morning. If worsening swelling take 2 tablets, if swelling is ok just take 1 tablet 180 Tablet 0    bacitracin 500 UNIT/GM Ointment Apply 1 Each topically every day. Apply to fingers daily for infection around finger nails 28 g 0    sodium phosphate (READY-TO-USE) 7-19 GM/118ML Enema Insert 1 Enema into the rectum as needed (constipation). 133 mL 11    bisacodyl (DULCOLAX) 10 MG Suppos Insert 1 Suppository into the rectum as needed (constipation). 28 Suppository 3    collagenase (SANTYL) ointment Apply  topically.      Balsam [place]-Castor Oil (DERMULCERA) Ointment Apply  topically.      acetaminophen (TYLENOL) 500 MG Tab Take 500-1,000 mg by mouth " every 8 hours as needed.      levetiracetam (KEPPRA) 750 MG tablet Take 2 Tablets by mouth 2 times a day. 60 Tablet 11    albuterol 108 (90 Base) MCG/ACT Aero Soln inhalation aerosol Inhale 2 Puffs every 6 hours as needed for Shortness of Breath. 8.5 g 11    docusate sodium (COLACE) 100 MG Cap Take 1 Capsule by mouth 2 times a day as needed for Constipation. 60 Capsule 11    apixaban (ELIQUIS) 2.5mg Tab Take 1 Tablet by mouth 2 times a day. 60 Tablet 10    finasteride (PROSCAR) 5 MG Tab Take 1 Tablet by mouth every day. 90 Tablet 3    hydrOXYzine HCl (ATARAX) 25 MG Tab Take 1 Tablet by mouth at bedtime as needed for Itching. 90 Tablet 3    melatonin 5 mg Tab Take 1 Tablet by mouth at bedtime as needed (Insomnia). 30 Tablet 11    melatonin 5 mg Tab Take 1 Tablet by mouth at bedtime. 28 Tablet 10    polyethylene glycol 3350 (MIRALAX) 17 GM/SCOOP Powder Take 17 g by mouth 1 time a day as needed (constipation). 507 g 3    diphenhydrAMINE-APAP, sleep, (TYLENOL PM EXTRA STRENGTH)  MG Tab Take 1 Tablet by mouth at bedtime as needed (insominia, pain). 14 Tablet 11    guaifenesin dextromethorphan sugar free (DIABETIC TUSSIN DM)  MG/5ML Liquid soln Take 10 mL by mouth every 6 hours as needed for Cough. 180 mL 3    traZODone (DESYREL) 50 MG Tab Take 0.5 Tablets by mouth at bedtime as needed for Sleep. 30 Tablet 11    vitamin A & D (A&D OINTMENT) Ointment Apply 1 Application topically every day. 113 g 11    zinc oxide oint (ZINC OXIDE OINTMENT) 20 % Ointment Apply 1 Application topically 3 times a day as needed (buttock skin break down). 56 g 3    tamsulosin (FLOMAX) 0.4 MG capsule Take 1 Capsule by mouth every day. 90 Capsule 3    simvastatin (ZOCOR) 10 MG Tab Take 1 Tablet by mouth every evening. 90 Tablet 3    chlorhexidine (PERIDEX) 0.12 % Solution Take 15 mL by mouth every day. 473 mL 1    acetaminophen (TYLENOL) 325 MG Tab Take 2 Tablets by mouth every 6 hours as needed for Mild Pain.       No current  "Epic-ordered facility-administered medications on file.       Health Maintenance: PCV-20 today    ROS:  ROS see HPI    Objective:     Exam:  /60   Pulse 72   Temp 36.3 °C (97.3 °F) (Temporal)   Ht 1.549 m (5' 1\")   Wt 65.8 kg (145 lb)   SpO2 95%   BMI 27.40 kg/m²  Body mass index is 27.4 kg/m².    Physical Exam  Vitals reviewed.   Constitutional:       General: He is not in acute distress.     Appearance: Normal appearance.   HENT:      Head:      Comments: Skin flap appears clean, dry and intact with some mild edema on right side that appears stable  Cardiovascular:      Rate and Rhythm: Normal rate and regular rhythm.      Heart sounds: Normal heart sounds.   Pulmonary:      Effort: Pulmonary effort is normal. No respiratory distress.      Breath sounds: Normal breath sounds.   Musculoskeletal:      Right lower leg: Edema present.      Left lower leg: Edema present.      Comments: Positive sock sign with 1-2+ pitting edema B/L in lower legs up to knee   Skin:     General: Skin is warm and dry.      Findings: Lesion (ulcer on left medial buttock is closed and no surrounding erythema) and rash (dry skin on feet b/l, there is thickened skin worse on left than right on dorsal aspect of foot) present.   Neurological:      Mental Status: He is alert. Mental status is at baseline. He is disoriented.      Gait: Gait abnormal (shuffling steps, uses walker).   Psychiatric:         Mood and Affect: Mood normal.         Behavior: Behavior normal.         Assessment & Plan:     88 y.o. male with the following -     1. Candida rash of groin  - clotrimazole-betamethasone (LOTRISONE) 1-0.05 % Cream; Apply 1 Application. topically 2 times a day. Apply to foot and groin rash  Dispense: 45 g; Refill: 0  - Referral to Dermatology    2. Rash of both feet  He is on chronic minocycline for suppressive therapy 2/2 to post-op infection of skin flap on head so bacterial infection less likely and does not present as such on feet " at this time  Will switch to Lotrisone and see if that is more effective  Will have him establish with dermatology to assist with multitude of skin complaints  - clotrimazole-betamethasone (LOTRISONE) 1-0.05 % Cream; Apply 1 Application. topically 2 times a day. Apply to foot and groin rash  Dispense: 45 g; Refill: 0  - Referral to Dermatology    3. Pressure injury of left buttock, unstageable (HCC)  Has improved, patient frustrated it is still present  Encouraged him that it is closed and healing, to off load his bottom as he has been and not to pick at it    4. Need for vaccination  - Pneumococcal Conjugate Vaccine 20-Valent (19 yrs+)    5. Status post skin flap graft  Appears to be stable, abrasion on it has healed    6. Chronic congestive heart failure, unspecified heart failure type (HCC)   No shortness of breath or chest pain  Lower extremity edema stable to a little worse  Patient to continue with diuretic therapy, compression and elevation will monitor for changes    Return in about 4 weeks (around 5/22/2023) for Medication F/U.    Please note that this dictation was created using voice recognition software. I have made every reasonable attempt to correct obvious errors, but I expect that there are errors of grammar and possibly content that I did not discover before finalizing the note.

## 2023-05-22 NOTE — PATIENT INSTRUCTIONS
[facility] DERMATOLOGY (rash)  [address]  [place] [address]  Phone: [phone]    [facility]  [facility] FOR HEART AND VASCULAR (heart)  [address]  [place] [address]  Phone: [phone]  Fax: [phone]    Neurology [facility]  [address], [facility] [address]  [place] [address]  Phone: [phone]

## 2023-05-22 NOTE — PROGRESS NOTES
"Subjective:     CC: \"rash, suture\"    HPI:   [place] presents today with:    Feels like his legs are a little bit better  Did fall out of his chair when he leaned forward too far and has scab on back of his hand  Reports his rash is improving, not itching as much  Roland thought they saw suture remaining on his scalp  Does get urinary incontinence, happens after sitting for a while then he stands up he will urinate. He is signed up to order adult diapers.        No problems updated.    Current Outpatient Medications Ordered in Epic   Medication Sig Dispense Refill    simvastatin (ZOCOR) 10 MG Tab TAKE 1 TABLET BY MOUTH EVERY EVENING 28 Tablet 10    tamsulosin (FLOMAX) 0.4 MG capsule TAKE 1 CAPSULE BY MOUTH ONCE DAILY 28 Capsule 10    finasteride (PROSCAR) 5 MG Tab TAKE 1 TABLET BY MOUTH ONCE DAILY 28 Tablet 10    furosemide (LASIX) 40 MG Tab Take 1 Tablet by mouth every morning. Can take a second tablet in the afternoon for worsening lower extremity swelling 90 Tablet 3    clotrimazole-betamethasone (LOTRISONE) 1-0.05 % Cream Apply 1 Application. topically 2 times a day. Apply to foot and groin rash 45 g 0    potassium chloride ER (KLOR-CON) 10 MEQ tablet Take 1 Tablet by mouth 2 times a day. 60 Tablet 3    Magnesium Citrate 200 MG Tab Take 200 mg by mouth every day. 90 Tablet 3    Elastic Bandages & Supports (MEDICAL COMPRESSION STOCKINGS) Misc 1 Application 1 time a day as needed (swelling in legs). 2 Each 1    minocycline (MINOCIN) 100 MG Cap Take 1 Capsule by mouth 2 times a day. 60 Capsule 11    NON SPECIFIED Apply 1 Units topically every day. I donut shaped pillow to sit on to off load pressure ulcer 1 Each 0    bacitracin 500 UNIT/GM Ointment Apply 1 Each topically every day. Apply to fingers daily for infection around finger nails 28 g 0    sodium phosphate (READY-TO-USE) 7-19 GM/118ML Enema Insert 1 Enema into the rectum as needed (constipation). 133 mL 11    bisacodyl (DULCOLAX) 10 MG Suppos Insert 1 " Suppository into the rectum as needed (constipation). 28 Suppository 3    collagenase (SANTYL) ointment Apply  topically.      Balsam [place]-Castor Oil (DERMULCERA) Ointment Apply  topically.      acetaminophen (TYLENOL) 500 MG Tab Take 500-1,000 mg by mouth every 8 hours as needed.      levetiracetam (KEPPRA) 750 MG tablet Take 2 Tablets by mouth 2 times a day. 60 Tablet 11    albuterol 108 (90 Base) MCG/ACT Aero Soln inhalation aerosol Inhale 2 Puffs every 6 hours as needed for Shortness of Breath. 8.5 g 11    docusate sodium (COLACE) 100 MG Cap Take 1 Capsule by mouth 2 times a day as needed for Constipation. 60 Capsule 11    apixaban (ELIQUIS) 2.5mg Tab Take 1 Tablet by mouth 2 times a day. 60 Tablet 10    hydrOXYzine HCl (ATARAX) 25 MG Tab Take 1 Tablet by mouth at bedtime as needed for Itching. 90 Tablet 3    melatonin 5 mg Tab Take 1 Tablet by mouth at bedtime as needed (Insomnia). 30 Tablet 11    melatonin 5 mg Tab Take 1 Tablet by mouth at bedtime. 28 Tablet 10    polyethylene glycol 3350 (MIRALAX) 17 GM/SCOOP Powder Take 17 g by mouth 1 time a day as needed (constipation). 507 g 3    diphenhydrAMINE-APAP, sleep, (TYLENOL PM EXTRA STRENGTH)  MG Tab Take 1 Tablet by mouth at bedtime as needed (insominia, pain). 14 Tablet 11    guaifenesin dextromethorphan sugar free (DIABETIC TUSSIN DM)  MG/5ML Liquid soln Take 10 mL by mouth every 6 hours as needed for Cough. 180 mL 3    traZODone (DESYREL) 50 MG Tab Take 0.5 Tablets by mouth at bedtime as needed for Sleep. 30 Tablet 11    vitamin A & D (A&D OINTMENT) Ointment Apply 1 Application topically every day. 113 g 11    zinc oxide oint (ZINC OXIDE OINTMENT) 20 % Ointment Apply 1 Application topically 3 times a day as needed (buttock skin break down). 56 g 3    chlorhexidine (PERIDEX) 0.12 % Solution Take 15 mL by mouth every day. 473 mL 1    acetaminophen (TYLENOL) 325 MG Tab Take 2 Tablets by mouth every 6 hours as needed for Mild Pain.       No current  "Epic-ordered facility-administered medications on file.       Health Maintenance: Completed    ROS:  ROS see HPI    Objective:     Exam:  /52 (BP Location: Right arm, Patient Position: Sitting, BP Cuff Size: Adult)   Pulse 60   Temp 36.5 °C (97.7 °F) (Temporal)   Ht 1.549 m (5' 1\")   Wt 65.8 kg (145 lb)   SpO2 95%   BMI 27.40 kg/m²  Body mass index is 27.4 kg/m².    Physical Exam  Vitals reviewed.   Constitutional:       General: He is not in acute distress.     Appearance: Normal appearance. He is not toxic-appearing.   HENT:      Head:      Comments: Skin flap is clean dry and intact  I cannot find a suture along incision site in area indicated  Cardiovascular:      Rate and Rhythm: Normal rate and regular rhythm.      Heart sounds: Normal heart sounds.   Pulmonary:      Effort: Pulmonary effort is normal. No respiratory distress.      Breath sounds: Normal breath sounds.   Musculoskeletal:      Right lower leg: Edema present.      Left lower leg: Edema present.   Skin:     Findings: Rash (improving rash in groin) present.      Comments: Decubitus ulcer on left glute is closed, no swelling or surrounding erythema   Neurological:      Mental Status: He is alert. Mental status is at baseline.   Psychiatric:         Mood and Affect: Mood normal.         Behavior: Behavior normal.           Assessment & Plan:     88 y.o. male with the following -     1. Chronic congestive heart failure, unspecified heart failure type (HCC)  Weight is stable at 145, legs still edematous but not effecting mobility, no shortness of breath  Will continue on Lasix 40 mg  Provided contact information for cardiology in [place]  - CBC WITHOUT DIFFERENTIAL; Future  - Comp Metabolic Panel; Future  - proBrain Natriuretic Peptide, NT; Future    2. Chronic atrial fibrillation (HCC)  - CBC WITHOUT DIFFERENTIAL; Future  - Comp Metabolic Panel; Future  - proBrain Natriuretic Peptide, NT; Future    3. Candida rash of groin  Provided contact " information for Dermatology in [place]  Rash appears to be improving, will continue with antifungal    4. Pressure injury of left buttock, unstageable (HCC)  Healing, is closed and no signs of secondary infection    5. Status post skin flap graft  Had recent telehealth with surgical team, appears to be healing well  I do no see any sutures remaining or remnants.   Continue minocycline indefinitely      Return in about 4 weeks (around 6/19/2023), or if symptoms worsen or fail to improve, for Annual Medicare.    Please note that this dictation was created using voice recognition software. I have made every reasonable attempt to correct obvious errors, but I expect that there are errors of grammar and possibly content that I did not discover before finalizing the note.

## 2023-06-13 NOTE — PROGRESS NOTES
Community Health Worker Follow-Up    Reason for outreach: CHW Leonid tried calling the pt to check in with him and to see if he had any needs at this time. Pt has been on [facility] for 3 months and is due for graduation.    CHW Interventions: n/a    Specific Resources Provided:  Housing/Shelter: n/a  Transportation: n/a  Food: n/a  Financial: n/a  Social Supports: n/a  Other: n/a    Plan: CHW will pt the pt out for 1 month and call to discuss discharge from [facility].

## 2023-07-03 NOTE — PATIENT INSTRUCTIONS
Make sure you are taking your Furosemide (Lasix) daily  Prop your legs up while you are resting    Schedule to do your blood work and schedule to do your echocardiogram    Please schedule to see Cardiology  [facility]  [address], [facility] [address]  [place] [address]  Phone: [phone]  Fax: [phone]    Please schedule to see Neurology  Neurology [facility] [address] [facility]  [place] [address]  Phone: [phone]

## 2023-07-03 NOTE — PROGRESS NOTES
"Subjective:     CC: \"leg swelling and found the missing sutures\"    HPI:   [place] presents today with:    Patient has stable leg swelling  He reports propping his legs up during the evenings when he is laying down but not as much at night  He is not having chest pain or shortness of breath  He reports compliance with his diuretic most days  He didn't want to go to cardiology because they were going to schedule him with a PA  He found the sutures that were left behind that we couldn't find previously they are in his eyebrow on the left side    No problems updated.    Current Outpatient Medications Ordered in Epic   Medication Sig Dispense Refill    levetiracetam (KEPPRA) 750 MG tablet TAKE 2 TABLETS BY MOUTH TWICE DAILY 112 Tablet 10    potassium chloride ER (KLOR-CON) 10 MEQ tablet TAKE 1 TABLET BY MOUTH TWICE DAILY 56 Tablet 10    simvastatin (ZOCOR) 10 MG Tab TAKE 1 TABLET BY MOUTH EVERY EVENING 28 Tablet 10    tamsulosin (FLOMAX) 0.4 MG capsule TAKE 1 CAPSULE BY MOUTH ONCE DAILY 28 Capsule 10    finasteride (PROSCAR) 5 MG Tab TAKE 1 TABLET BY MOUTH ONCE DAILY 28 Tablet 10    furosemide (LASIX) 40 MG Tab Take 1 Tablet by mouth every morning. Can take a second tablet in the afternoon for worsening lower extremity swelling 90 Tablet 3    clotrimazole-betamethasone (LOTRISONE) 1-0.05 % Cream Apply 1 Application. topically 2 times a day. Apply to foot and groin rash 45 g 0    Magnesium Citrate 200 MG Tab Take 200 mg by mouth every day. 90 Tablet 3    Elastic Bandages & Supports (MEDICAL COMPRESSION STOCKINGS) Misc 1 Application 1 time a day as needed (swelling in legs). 2 Each 1    minocycline (MINOCIN) 100 MG Cap Take 1 Capsule by mouth 2 times a day. 60 Capsule 11    NON SPECIFIED Apply 1 Units topically every day. I donut shaped pillow to sit on to off load pressure ulcer 1 Each 0    bacitracin 500 UNIT/GM Ointment Apply 1 Each topically every day. Apply to fingers daily for infection around finger nails 28 g 0    " sodium phosphate (READY-TO-USE) 7-19 GM/118ML Enema Insert 1 Enema into the rectum as needed (constipation). 133 mL 11    bisacodyl (DULCOLAX) 10 MG Suppos Insert 1 Suppository into the rectum as needed (constipation). 28 Suppository 3    collagenase (SANTYL) ointment Apply  topically.      Balsam [place]-Castor Oil (DERMULCERA) Ointment Apply  topically.      acetaminophen (TYLENOL) 500 MG Tab Take 500-1,000 mg by mouth every 8 hours as needed.      albuterol 108 (90 Base) MCG/ACT Aero Soln inhalation aerosol Inhale 2 Puffs every 6 hours as needed for Shortness of Breath. 8.5 g 11    docusate sodium (COLACE) 100 MG Cap Take 1 Capsule by mouth 2 times a day as needed for Constipation. 60 Capsule 11    apixaban (ELIQUIS) 2.5mg Tab Take 1 Tablet by mouth 2 times a day. 60 Tablet 10    hydrOXYzine HCl (ATARAX) 25 MG Tab Take 1 Tablet by mouth at bedtime as needed for Itching. 90 Tablet 3    melatonin 5 mg Tab Take 1 Tablet by mouth at bedtime as needed (Insomnia). 30 Tablet 11    melatonin 5 mg Tab Take 1 Tablet by mouth at bedtime. 28 Tablet 10    polyethylene glycol 3350 (MIRALAX) 17 GM/SCOOP Powder Take 17 g by mouth 1 time a day as needed (constipation). 507 g 3    diphenhydrAMINE-APAP, sleep, (TYLENOL PM EXTRA STRENGTH)  MG Tab Take 1 Tablet by mouth at bedtime as needed (insominia, pain). 14 Tablet 11    guaifenesin dextromethorphan sugar free (DIABETIC TUSSIN DM)  MG/5ML Liquid soln Take 10 mL by mouth every 6 hours as needed for Cough. 180 mL 3    traZODone (DESYREL) 50 MG Tab Take 0.5 Tablets by mouth at bedtime as needed for Sleep. 30 Tablet 11    vitamin A & D (A&D OINTMENT) Ointment Apply 1 Application topically every day. 113 g 11    zinc oxide oint (ZINC OXIDE OINTMENT) 20 % Ointment Apply 1 Application topically 3 times a day as needed (buttock skin break down). 56 g 3    chlorhexidine (PERIDEX) 0.12 % Solution Take 15 mL by mouth every day. 473 mL 1    acetaminophen (TYLENOL) 325 MG Tab Take 2  "Tablets by mouth every 6 hours as needed for Mild Pain.       No current Epic-ordered facility-administered medications on file.       Health Maintenance: acute visit    ROS:  ROS see HPI    Objective:     Exam:  /70   Pulse 72   Temp 36.6 °C (97.8 °F)   Resp 16   Ht 1.549 m (5' 1\")   Wt 65.8 kg (145 lb)   SpO2 93%   BMI 27.40 kg/m²  Body mass index is 27.4 kg/m².    Physical Exam  Vitals reviewed.   Constitutional:       General: He is not in acute distress.     Appearance: Normal appearance.   HENT:      Head:      Comments: Skin flap on forehead appears clean, dry, intact, it flops to the right, no new abrasions or other lesions  Eyes:      Comments: Two sutures on left eye lid near eye brow, no erythema, swelling, drainage   Cardiovascular:      Rate and Rhythm: Normal rate and regular rhythm.      Heart sounds: Normal heart sounds.   Pulmonary:      Effort: Pulmonary effort is normal. No respiratory distress.      Breath sounds: Normal breath sounds.   Musculoskeletal:      Right lower leg: Edema present.      Left lower leg: Edema present.   Skin:     General: Skin is warm and dry.   Neurological:      Mental Status: He is alert. Mental status is at baseline.      Gait: Gait abnormal (uses walker).   Psychiatric:         Mood and Affect: Mood normal.         Behavior: Behavior normal.             Assessment & Plan:     88 y.o. male with the following -     1. Chronic congestive heart failure, unspecified heart failure type (HCC)  Encouraged patient to stay consistent with his diuretic and to elevated legs when resting  Encouraged him to make appointment with cardiology, the PA's are very good  Reprinted previously ordered labs  Order for echo  - EC-ECHOCARDIOGRAM COMPLETE W/O CONT; Future    2. Visit for suture removal  We were both looking in the wrong area for the \"rogue\" sutures. They have been found. No signs of infection. He is on suppressive minocycline. See procedure note for " details.        Return in about 6 weeks (around 8/14/2023) for Lab F/U, Imaging F/U.    Please note that this dictation was created using voice recognition software. I have made every reasonable attempt to correct obvious errors, but I expect that there are errors of grammar and possibly content that I did not discover before finalizing the note.

## 2023-07-03 NOTE — PROCEDURES
Suture Removal    Date/Time: 7/3/2023 10:38 AM    Performed by: Wilton Truong D.O.  Authorized by: Wilton Truong D.O.  Body area: head/neck  Location details: left eyelid  Wound Appearance: clean  Sutures Removed: 2  Patient tolerance: patient tolerated the procedure well with no immediate complications  Comments: Two very small sutures remained from previous laceration closure. They were missed at last visit as we were looking in the wrong spot, they were very small and hidden by eyebrows and eye lid wrinkles. No signs of infection. Technically challenging removal due to size, location and being in place for extended length of time. He did tolerate removal, discussed surveillance for infection.

## 2023-07-12 NOTE — PROGRESS NOTES
CHW tried calling the pt to follow up with him. Pt did not answer and this CHW could not leave a VM. 7/12  CHW tried calling the pt to follow up with him. Pt did not answer and this CHW could not leave a VM. 7/13  CHW tried calling the pt to follow up with him. Pt did not answer and this CHW could not leave a VM this CHW will put the pt out for next month. 7/14

## 2023-08-03 NOTE — PROGRESS NOTES
"Subjective:     CC: \"shoulder pain and leg swelling\"    HPI:   [place] presents today with:    Right handed gentleman  S/P right shoulder replacement  Left shoulder pain, has been going on for a while, has worsened lately  Hurts anytime he tries to lift it or use it  Denies recent falls or other trauma    Leg swelling  Seems to have worsened  He lives at assisted living so does not know what medications he takes but is tired of all the pills he has to take  When I asked him about the labs and imaging and adding a new medicine he got exacerbated. He was reluctant but agreed to pursue these. When I asked him about not doing any treatment and moving to a more comfort care he at first said just give me cyanide and get it over with. He said he didn't really want to do that but it is tiring for him to continue all the treatment and follow ups. He also said he doesn't want to make these decisions and would like me to call his son.    No problems updated.    Current Outpatient Medications Ordered in Epic   Medication Sig Dispense Refill    spironolactone (ALDACTONE) 25 MG Tab Take 1 Tablet by mouth every morning. 90 Tablet 3    furosemide (LASIX) 40 MG Tab Take 1-2 Tablets by mouth every morning. Can take a second tablet in the afternoon for worsening lower extremity swelling 90 Tablet 3    levetiracetam (KEPPRA) 750 MG tablet TAKE 2 TABLETS BY MOUTH TWICE DAILY 112 Tablet 10    finasteride (PROSCAR) 5 MG Tab TAKE 1 TABLET BY MOUTH ONCE DAILY 28 Tablet 10    clotrimazole-betamethasone (LOTRISONE) 1-0.05 % Cream Apply 1 Application. topically 2 times a day. Apply to foot and groin rash 45 g 0    bacitracin 500 UNIT/GM Ointment Apply 1 Each topically every day. Apply to fingers daily for infection around finger nails 28 g 0    bisacodyl (DULCOLAX) 10 MG Suppos Insert 1 Suppository into the rectum as needed (constipation). 28 Suppository 3    collagenase (SANTYL) ointment Apply  topically.      Balsam [place]-Castor Oil " (DERMULCERA) Ointment Apply  topically.      acetaminophen (TYLENOL) 500 MG Tab Take 500-1,000 mg by mouth every 8 hours as needed.      albuterol 108 (90 Base) MCG/ACT Aero Soln inhalation aerosol Inhale 2 Puffs every 6 hours as needed for Shortness of Breath. 8.5 g 11    docusate sodium (COLACE) 100 MG Cap Take 1 Capsule by mouth 2 times a day as needed for Constipation. 60 Capsule 11    apixaban (ELIQUIS) 2.5mg Tab Take 1 Tablet by mouth 2 times a day. 60 Tablet 10    hydrOXYzine HCl (ATARAX) 25 MG Tab Take 1 Tablet by mouth at bedtime as needed for Itching. 90 Tablet 3    melatonin 5 mg Tab Take 1 Tablet by mouth at bedtime as needed (Insomnia). 30 Tablet 11    melatonin 5 mg Tab Take 1 Tablet by mouth at bedtime. 28 Tablet 10    diphenhydrAMINE-APAP, sleep, (TYLENOL PM EXTRA STRENGTH)  MG Tab Take 1 Tablet by mouth at bedtime as needed (insominia, pain). 14 Tablet 11    guaifenesin dextromethorphan sugar free (DIABETIC TUSSIN DM)  MG/5ML Liquid soln Take 10 mL by mouth every 6 hours as needed for Cough. 180 mL 3    potassium chloride ER (KLOR-CON) 10 MEQ tablet TAKE 1 TABLET BY MOUTH TWICE DAILY (Patient not taking: Reported on 8/3/2023) 56 Tablet 10    simvastatin (ZOCOR) 10 MG Tab TAKE 1 TABLET BY MOUTH EVERY EVENING (Patient not taking: Reported on 8/3/2023) 28 Tablet 10    tamsulosin (FLOMAX) 0.4 MG capsule TAKE 1 CAPSULE BY MOUTH ONCE DAILY 28 Capsule 10    Magnesium Citrate 200 MG Tab Take 200 mg by mouth every day. 90 Tablet 3    Elastic Bandages & Supports (MEDICAL COMPRESSION STOCKINGS) Misc 1 Application 1 time a day as needed (swelling in legs). (Patient not taking: Reported on 8/3/2023) 2 Each 1    minocycline (MINOCIN) 100 MG Cap Take 1 Capsule by mouth 2 times a day. 60 Capsule 11    NON SPECIFIED Apply 1 Units topically every day. I donut shaped pillow to sit on to off load pressure ulcer 1 Each 0    sodium phosphate (READY-TO-USE) 7-19 GM/118ML Enema Insert 1 Enema into the rectum as  "needed (constipation). 133 mL 11    polyethylene glycol 3350 (MIRALAX) 17 GM/SCOOP Powder Take 17 g by mouth 1 time a day as needed (constipation). 507 g 3    traZODone (DESYREL) 50 MG Tab Take 0.5 Tablets by mouth at bedtime as needed for Sleep. 30 Tablet 11    vitamin A & D (A&D OINTMENT) Ointment Apply 1 Application topically every day. 113 g 11    zinc oxide oint (ZINC OXIDE OINTMENT) 20 % Ointment Apply 1 Application topically 3 times a day as needed (buttock skin break down). 56 g 3     No current Epic-ordered facility-administered medications on file.       Health Maintenance: completed    ROS:  ROS see HPI    Objective:     Exam:  /60   Pulse (!) 58   Temp 36.4 °C (97.6 °F) (Temporal)   Resp 16   Ht 1.549 m (5' 1\")   Wt 66.2 kg (146 lb)   SpO2 98%   BMI 27.59 kg/m²  Body mass index is 27.59 kg/m².    Physical Exam  Vitals reviewed.   Constitutional:       General: He is not in acute distress.     Appearance: Normal appearance.   HENT:      Head: Normocephalic and atraumatic.   Musculoskeletal:         General: No swelling, tenderness, deformity or signs of injury.      Right lower leg: Edema present.      Left lower leg: Edema present.      Comments: Left shoulder is not tender to palpation, no deformity palpated   Skin:     General: Skin is warm and dry.   Neurological:      Mental Status: He is alert. Mental status is at baseline.      Gait: Gait abnormal (uses walker).   Psychiatric:         Mood and Affect: Mood normal.         Behavior: Behavior normal.           Assessment & Plan:     88 y.o. male with the following -     1. Chronic left shoulder pain  - DX-SHOULDER 2+ LEFT; Future  - Referral to Physical Therapy    2. Chronic congestive heart failure, unspecified heart failure type (HCC)  Reminded patient to pursue getting labs and echo scheduled  Discussed with him that having cardiology on board would be beneficial and can follow up on referral  Patient reluctant at first but will try " adding spironolactone and try to increase dose of lasix  Patient admits to leaky bladder which diuretic is not helping, he is wearing a depends  Encouraged him to use wraps and leg elevation while resting at home  - spironolactone (ALDACTONE) 25 MG Tab; Take 1 Tablet by mouth every morning.  Dispense: 90 Tablet; Refill: 3  - furosemide (LASIX) 40 MG Tab; Take 1-2 Tablets by mouth every morning. Can take a second tablet in the afternoon for worsening lower extremity swelling  Dispense: 90 Tablet; Refill: 3        Return in about 4 weeks (around 8/31/2023) for Imaging F/U, Lab F/U, Medication F/U.    Please note that this dictation was created using voice recognition software. I have made every reasonable attempt to correct obvious errors, but I expect that there are errors of grammar and possibly content that I did not discover before finalizing the note.

## 2023-08-03 NOTE — PATIENT INSTRUCTIONS
To schedule Echocardiogram please call [phone]    Please do your labs as soon as possible, you do not need to fast for these    [facility]  [facility]  [address] [place] [address] [id number]  [address] [place] [id number]  Phone: [phone]  Fax: [phone]

## 2023-08-04 NOTE — TELEPHONE ENCOUNTER
Had good discussion with son  He is going to work on getting Mychart access for himself to help facilitate the plan a bit better  They are more comfort focused but would like to continue treatment for him, just not major interventions  Patient will need to have appointments made for PT, Cardiology and Echo

## 2023-08-18 NOTE — PROGRESS NOTES
CHW tried calling the for the monthly follow up with GC. Pt did not answer and this CHW could not leave a VM. 8/18  Community Health Worker Follow-Up    Reason for outreach: CHW called the pt for monthly follow up for GC.    CHW Interventions: CHW Called an could not leave a message again this Month. CHW sent the pt a LoggedIn message releasing the pt from [facility].    Specific Resources Provided:  Housing/Shelter: n/a  Transportation: n/a  Food: n/a  Financial: n/a  Social Supports: n/a  Other: n/a    Plan: CHW will no longer follow at this time.

## 2023-09-11 PROBLEM — N18.31 STAGE 3A CHRONIC KIDNEY DISEASE: Status: ACTIVE | Noted: 2023-01-01

## 2023-09-11 NOTE — PROGRESS NOTES
"Subjective:     CC: \"lab and imaging follow up\"    HPI:   [place] presents today with:    Tells me he still has pain in his upper left arm and shoulder  Patient denies pain elsewhere  He denies recent falls  He has urinary leakage that he wears depends for  He takes his medication as they are provided for him  He generally feels well/at his base line  Patient had televist with surgeon for his forehead. Being careful of skin break down and as flap decompresses is interfering with the vision out of his right eye.    Problem   Stage 3a Chronic Kidney Disease (Hcc)       Current Outpatient Medications Ordered in Epic   Medication Sig Dispense Refill    traMADol (ULTRAM) 50 MG Tab Take 0.5 Tablets by mouth every 12 hours as needed for Severe Pain or Moderate Pain (take for shoulder pain, take with a tab of tylenol) for up to 90 days. 30 Tablet 2    clotrimazole-betamethasone (LOTRISONE) 1-0.05 % Cream APPLY 1 APPLICATION TOPICALLY 2 TIMES A DAY. APPLY TO FOOT AND GROIN RASH 45 g 10    spironolactone (ALDACTONE) 25 MG Tab Take 1 Tablet by mouth every morning. 90 Tablet 3    furosemide (LASIX) 40 MG Tab Take 1-2 Tablets by mouth every morning. Can take a second tablet in the afternoon for worsening lower extremity swelling 90 Tablet 3    levetiracetam (KEPPRA) 750 MG tablet TAKE 2 TABLETS BY MOUTH TWICE DAILY 112 Tablet 10    potassium chloride ER (KLOR-CON) 10 MEQ tablet TAKE 1 TABLET BY MOUTH TWICE DAILY 56 Tablet 10    simvastatin (ZOCOR) 10 MG Tab TAKE 1 TABLET BY MOUTH EVERY EVENING 28 Tablet 10    tamsulosin (FLOMAX) 0.4 MG capsule TAKE 1 CAPSULE BY MOUTH ONCE DAILY 28 Capsule 10    finasteride (PROSCAR) 5 MG Tab TAKE 1 TABLET BY MOUTH ONCE DAILY 28 Tablet 10    Magnesium Citrate 200 MG Tab Take 200 mg by mouth every day. 90 Tablet 3    Elastic Bandages & Supports (MEDICAL COMPRESSION STOCKINGS) Misc 1 Application 1 time a day as needed (swelling in legs). 2 Each 1    minocycline (MINOCIN) 100 MG Cap Take 1 Capsule by " mouth 2 times a day. 60 Capsule 11    NON SPECIFIED Apply 1 Units topically every day. I donut shaped pillow to sit on to off load pressure ulcer 1 Each 0    bacitracin 500 UNIT/GM Ointment Apply 1 Each topically every day. Apply to fingers daily for infection around finger nails 28 g 0    sodium phosphate (READY-TO-USE) 7-19 GM/118ML Enema Insert 1 Enema into the rectum as needed (constipation). 133 mL 11    bisacodyl (DULCOLAX) 10 MG Suppos Insert 1 Suppository into the rectum as needed (constipation). 28 Suppository 3    collagenase (SANTYL) ointment Apply  topically.      Balsam [place]-Castor Oil (DERMULCERA) Ointment Apply  topically.      acetaminophen (TYLENOL) 500 MG Tab Take 500-1,000 mg by mouth every 8 hours as needed.      albuterol 108 (90 Base) MCG/ACT Aero Soln inhalation aerosol Inhale 2 Puffs every 6 hours as needed for Shortness of Breath. 8.5 g 11    docusate sodium (COLACE) 100 MG Cap Take 1 Capsule by mouth 2 times a day as needed for Constipation. 60 Capsule 11    apixaban (ELIQUIS) 2.5mg Tab Take 1 Tablet by mouth 2 times a day. 60 Tablet 10    hydrOXYzine HCl (ATARAX) 25 MG Tab Take 1 Tablet by mouth at bedtime as needed for Itching. 90 Tablet 3    melatonin 5 mg Tab Take 1 Tablet by mouth at bedtime as needed (Insomnia). 30 Tablet 11    melatonin 5 mg Tab Take 1 Tablet by mouth at bedtime. 28 Tablet 10    polyethylene glycol 3350 (MIRALAX) 17 GM/SCOOP Powder Take 17 g by mouth 1 time a day as needed (constipation). 507 g 3    diphenhydrAMINE-APAP, sleep, (TYLENOL PM EXTRA STRENGTH)  MG Tab Take 1 Tablet by mouth at bedtime as needed (insominia, pain). 14 Tablet 11    guaifenesin dextromethorphan sugar free (DIABETIC TUSSIN DM)  MG/5ML Liquid soln Take 10 mL by mouth every 6 hours as needed for Cough. 180 mL 3    traZODone (DESYREL) 50 MG Tab Take 0.5 Tablets by mouth at bedtime as needed for Sleep. 30 Tablet 11    vitamin A & D (A&D OINTMENT) Ointment Apply 1 Application topically  "every day. 113 g 11    zinc oxide oint (ZINC OXIDE OINTMENT) 20 % Ointment Apply 1 Application topically 3 times a day as needed (buttock skin break down). 56 g 3     No current Epic-ordered facility-administered medications on file.       Health Maintenance: completed    ROS:  ROS see HPI    Objective:     Exam:  /68   Pulse 71   Temp 35.9 °C (96.7 °F) (Temporal)   Ht 1.549 m (5' 1\")   Wt 66.1 kg (145 lb 12.8 oz)   SpO2 97%   BMI 27.55 kg/m²  Body mass index is 27.55 kg/m².    Physical Exam  Vitals reviewed.   Constitutional:       General: He is not in acute distress.     Appearance: Normal appearance. He is not toxic-appearing.   HENT:      Head:      Comments: Skin flap appears intact, he has some bandaids on it he says are to help hold it up out of his vision  Cardiovascular:      Rate and Rhythm: Normal rate and regular rhythm.      Pulses: Normal pulses.      Heart sounds: Normal heart sounds. No murmur heard.  Pulmonary:      Effort: Pulmonary effort is normal.      Breath sounds: Normal breath sounds.   Musculoskeletal:      Right lower leg: Edema present.      Left lower leg: Edema present.   Neurological:      Mental Status: He is alert. Mental status is at baseline.      Gait: Gait abnormal (uses walker).   Psychiatric:         Mood and Affect: Mood normal.         Behavior: Behavior normal.         Imaging:  Left shoulder X-ray 8/3/23  IMPRESSION:  1.  Marked left glenohumeral joint degenerative changes.  2.  Old displaced fracture of the distal left clavicle.  3.  Deformity of the left scapula suggestive of old fracture.    Echocardiogram 9/7/23  CONCLUSIONS  Normal transthoracic echocardiogram.   No prior study is available for comparison.  The left ventricle is normal in size and thickness.  Normal left   ventricular systolic function. The left ventricular ejection fraction   is visually estimated to be 55%. Normal regional wall motion. Normal   diastolic function.    Labs: "             Assessment & Plan:     88 y.o. male with the following -     1. Chronic left shoulder pain  Patient complaining of pain, has appearance of old fractures on left shoulder, he does not recall every having these. Patient on AOC for A. Fib so cannot use NSAIDS. He has tolerated tramadol in past, will have him take very low dose as it can cause drowsiness and raise fall risk. He can take a tylenol with this and will see if this helps his pain improve.  - Consent for Opiate Prescription  - traMADol (ULTRAM) 50 MG Tab; Take 0.5 Tablets by mouth every 12 hours as needed for Severe Pain or Moderate Pain (take for shoulder pain, take with a tab of tylenol) for up to 90 days.  Dispense: 30 Tablet; Refill: 2    2. Chronic congestive heart failure, unspecified heart failure type (HCC)  Echo does not show signs of heart failure, BNP mildly elevated, legs still have pitting edema but appear improved compared to previous. Will continue on current diuretics including Lasix 40-80 mg along with elevation while at rest. He looks a little better than the last time he was in which is encouraging.     State 3a Chronic Kidney Disease (HCC)  Chronic and stable, will continue to monitor at least twice per year.      Return in about 2 months (around 11/11/2023), or if symptoms worsen or fail to improve, for Medication F/U.    Please note that this dictation was created using voice recognition software. I have made every reasonable attempt to correct obvious errors, but I expect that there are errors of grammar and possibly content that I did not discover before finalizing the note.

## 2023-10-30 PROBLEM — I60.9 SAH (SUBARACHNOID HEMORRHAGE) (HCC): Status: ACTIVE | Noted: 2023-01-01

## 2023-10-30 PROBLEM — Z71.89 ACP (ADVANCE CARE PLANNING): Status: ACTIVE | Noted: 2023-01-01

## 2023-10-30 PROBLEM — S06.6X0A SUBARACHNOID HEMORRHAGE FOLLOWING INJURY, NO LOSS OF CONSCIOUSNESS (HCC): Status: ACTIVE | Noted: 2023-01-01

## 2023-10-31 NOTE — ED NOTES
Patient report to OTONIEL Victoria, transferred to T208 accompanied by transport, patient AAO X4, respirations even and unlabored, on 2 liters o2 via nasal cannula, patient is at high risk for fall.

## 2023-10-31 NOTE — ED TRIAGE NOTES
Chino Ennis  88 y.o.  male  Chief Complaint   Patient presents with    T-5000 GLF     Brought in by ambulance from [facility] assisted living. Per staff patient had a multiple fall today ~ 6-7 times. Last fall patient hit head on a mattress. On eliquis. Denies loc. C/o left lower leg pain. + edema. GCS 15.

## 2023-10-31 NOTE — ED NOTES
Bedside report received from OTONIEL Augustin. Pt A+ox4, JERRY. Pt able to move all extremities, no weakness noted. Labs collected and sent.

## 2023-10-31 NOTE — PROGRESS NOTES
Initially consulted for admission after TBI from [facility].  Patient is known DNR/DNI.  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  Patient expressed he wants no transfers, surgery, or aggressive treatments.    Transitioning to Comfort Care/Hospice    Trauma signing off.    Abdiel Medellin MD, FACS

## 2023-10-31 NOTE — ASSESSMENT & PLAN NOTE
S/p GLF  On Eliquis -- Kcentra given in ED  Neurosurgery and trauma surgery consulted  Neurosurgery recommended transfer to [facility]    Patient however elected for comfort care and hospice arrangement    Comfort care

## 2023-10-31 NOTE — DISCHARGE PLANNING
Received Choice form at 2315  Agency/Facility Name: [facility]  Referral sent per Choice form @ 1037

## 2023-10-31 NOTE — ED NOTES
Assumed care of patient, patient bedside report received from OTONIEL Delgado . Pt AAO X 4 , respirations even and unlabored, on room air . Introduced self as pt RN, POC discussed, call light in reach, Fall risk interventions in place.

## 2023-10-31 NOTE — CARE PLAN
The patient is Stable - Low risk of patient condition declining or worsening         Progress made toward(s) clinical / shift goals:    Patient verbalizes understanding of plan of care. Pain has been managed throughout shift with repositioning and medication as seen on MAR. Bed in low and locked position, call light within reach. Pillows in place to float heels, patient able to reposition self.  Problem: Pain - Standard  Goal: Alleviation of pain or a reduction in pain to the patient’s comfort goal  Description: Target End Date:  Prior to discharge or change in level of care    Document on Vitals flowsheet    1.  Document pain using the appropriate pain scale per order or unit policy  2.  Educate and implement non-pharmacologic comfort measures (i.e. relaxation, distraction, massage, cold/heat therapy, etc.)  3.  Pain management medications as ordered  4.  Reassess pain after pain med administration per policy  5.  If opiods administered assess patient's response to pain medication is appropriate per POSS sedation scale  6.  Follow pain management plan developed in collaboration with patient and interdisciplinary team (including palliative care or pain specialists if applicable)  Outcome: Progressing     Problem: Knowledge Deficit - Standard  Goal: Patient and family/care givers will demonstrate understanding of plan of care, disease process/condition, diagnostic tests and medications  Description: Target End Date:  1-3 days or as soon as patient condition allows    Document in Patient Education    1.  Patient and family/caregiver oriented to unit, equipment, visitation policy and means for communicating concern  2.  Complete/review Learning Assessment  3.  Assess knowledge level of disease process/condition, treatment plan, diagnostic tests and medications  4.  Explain disease process/condition, treatment plan, diagnostic tests and medications  Outcome: Progressing     Problem: Skin Integrity  Goal: Skin integrity  is maintained or improved  Description: Target End Date:  Prior to discharge or change in level of care    Document interventions on Skin Risk/Herminio flowsheet groups and corresponding LDA    1.  Assess and monitor skin integrity, appearance and/or temperature  2.  Assess risk factors for impaired skin integrity and/or pressures ulcers  3.  Implement precautions to protect skin integrity in collaboration with interdisciplinary team  4.  Implement pressure ulcer prevention protocol if at risk for skin breakdown  5.  Confirm wound care consult if at risk for skin breakdown  6.  Ensure patient use of pressure relieving devices  (Low air loss bed, waffle overlay, heel protectors, ROHO cushion, etc)  Outcome: Progressing     Problem: Fall Risk  Goal: Patient will remain free from falls  Description: Target End Date:  Prior to discharge or change in level of care    Document interventions on the Mcknight Danyel Fall Risk Assessment    1.  Assess for fall risk factors  2.  Implement fall precautions  Outcome: Progressing       Patient is not progressing towards the following goals: N/A

## 2023-10-31 NOTE — PROGRESS NOTES
Report received from OTONIEL Swanson.  Assumed care of patient.  Assessment complete.  Plan of care gone over with the patient.  Patient resting in bed.  He is pretty tired.  Patient A & O  x 4.  No apparent signs of distress.  Safety precautions in place.  Hourly rounding in place.

## 2023-10-31 NOTE — DISCHARGE PLANNING
Medical Social Work    MSW received a call from ERP requesting assistance with getting a hold of pt's family regarding code status.  MSW contacted pt's son, Paolo ([phone]) who was not informed by [facility] regarding pt being sent to the hospital.  Pt's son was briefly updated and verified that pt is a DNR.  MSW informed pt's son that I would have ERP contact him with a better medical update.  ERP provided with pt's son's information and he will contact him.      Per ERP pt's family is wanting pt to go comfort care and possibly home ([facility]) on hospice.  Pt will be admitted due to injuries and plan to D/C home with hospice.

## 2023-10-31 NOTE — H&P
Hospital Medicine History & Physical Note    Date of Service  10/30/2023    Primary Care Physician  Wilton Truong D.O.    Consultants  Neurosurgery (Dr. Murrieta)  Trauma surgery (Dr. Medellin)  Palliative/hospice    Code Status  Comfort Care/DNR    Chief Complaint  Chief Complaint   Patient presents with    T-5000 GLF     [facility] Per staff patient had a multiple fall today ~ 6-7 times. Last fall patient hit head on a mattress. On eliquis. Denies loc. C/o left lower leg pain. + edema. GCS 15.        History of Presenting Illness  Chino Ennis is a 88 y.o. male from [facility] with history of atrial fibrillation on Eliquis, CHF, seizure, multiple other comorbidities, recent craniectomy at [facility] for meningioma who presented 10/30/2023 with evaluation for ground-level fall, TBI.  Patient has extensive trauma survey completed in ER.  He does have notable SAH on CT head.  Neurosurgery recommended patient transfer to [facility].  Kcentra was given as reversal.  However, it appears that patient did not wish to be transferred, instead elected for hospice/comfort care status.  Admitted to medicine service for observation.    I discussed the plan of care with patient, bedside RN, and pharmacy.    Review of Systems  Review of Systems   Constitutional:  Positive for malaise/fatigue. Negative for chills and fever.   HENT:  Negative for hearing loss and tinnitus.    Eyes:  Negative for blurred vision and double vision.   Respiratory:  Negative for cough and shortness of breath.    Cardiovascular:  Negative for chest pain and palpitations.   Gastrointestinal:  Positive for nausea. Negative for heartburn and vomiting.   Genitourinary:  Negative for dysuria and urgency.   Musculoskeletal:  Positive for falls and myalgias.   Skin:  Negative for itching and rash.   Neurological:  Positive for weakness and headaches. Negative for dizziness.   Endo/Heme/Allergies:  Negative for environmental  allergies. Bruises/bleeds easily.   Psychiatric/Behavioral:  Negative for depression and substance abuse.    All other systems reviewed and are negative.      Past Medical History   has a past medical history of Allergy, Anxiety, Arrhythmia, Arthritis, ASTHMA, Bladder calculi (3/7/2016), Blood clotting disorder (HCC) (2015, 1/2016), Bowel habit changes, CATARACT, Chronic gingivitis (2/17/2016), Closed head injury (9/28/2022), Cold (11/2019), Enlarged prostate, Headache(784.0), Heart burn, Heart murmur, History of DVT of lower extremity (12/23/2015), History of resection of meningioma (4/17/2013), History of transurethral resection of prostate (2/17/2016), History of transurethral resection of prostate (2/17/2016), Hyperlipidemia, IBD (inflammatory bowel disease), Indigestion, Insomnia (2/17/2016), OSTEOPOROSIS, 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    Surgical History   has a past surgical history that includes trans urethral resection prostate; tonsillectomy; dental extraction(s); arthroscopy, knee; craniotomy brain lab (2010); vasectomy; prostatectomy, radical retro; eye surgery; craniotomy; cystoscopy (N/A, 3/7/2016); lasertripsy (N/A, 3/7/2016); trans urethral resection prostate (N/A, 3/7/2016); knee arthroscopy (Left, 1975); cataract extraction with iol (2013); and hardware removal neuro (12/23/2019).     Family History  family history includes Cancer in his father and paternal uncle; Heart Disease in his father and maternal uncle; Hyperlipidemia in his father and maternal uncle; Hypertension in his father and maternal uncle; Stroke in his maternal uncle.   Family history reviewed with patient. There is family history that is  pertinent to the chief complaint.     Social History   reports that he quit smoking about 25 years ago. His smoking use included cigarettes. He started smoking about 55 years ago. He has a 45.0 pack-year smoking history. He has never used smokeless tobacco. He reports current alcohol use of about 1.0 oz of alcohol per week. He reports that he does not use drugs.    Allergies  Allergies   Allergen Reactions    Other Environmental Unspecified     Grasses and weeds=asthma     Shellfish Allergy Hives    Strawberry Hives    Fish Unspecified     Bones get stuck in teeth    Lactose Diarrhea     indigestion       Medications  Prior to Admission Medications   Prescriptions Last Dose Informant Patient Reported? Taking?   Balsam [place]-Castor Oil (DERMULCERA) Ointment   Yes No   Sig: Apply  topically.   Elastic Bandages & Supports (MEDICAL COMPRESSION STOCKINGS) Misc   No No   Si Application 1 time a day as needed (swelling in legs).   Magnesium Citrate 200 MG Tab   No No   Sig: Take 200 mg by mouth every day.   NON SPECIFIED   No No   Sig: Apply 1 Units topically every day. I donut shaped pillow to sit on to off load pressure ulcer   acetaminophen (TYLENOL) 500 MG Tab   Yes No   Sig: Take 500-1,000 mg by mouth every 8 hours as needed.   albuterol 108 (90 Base) MCG/ACT Aero Soln inhalation aerosol   No No   Sig: Inhale 2 Puffs every 6 hours as needed for Shortness of Breath.   apixaban (ELIQUIS) 2.5mg Tab   No No   Sig: Take 1 Tablet by mouth 2 times a day.   bacitracin 500 UNIT/GM Ointment   No No   Sig: Apply 1 Each topically every day. Apply to fingers daily for infection around finger nails   bisacodyl (DULCOLAX) 10 MG Suppos   No No   Sig: Insert 1 Suppository into the rectum as needed (constipation).   clotrimazole-betamethasone (LOTRISONE) 1-0.05 % Cream   No No   Sig: APPLY 1 APPLICATION TOPICALLY 2 TIMES A DAY. APPLY TO FOOT AND GROIN RASH   collagenase (SANTYL) ointment   Yes No   Sig: Apply  topically.    diphenhydrAMINE-APAP, sleep, (TYLENOL PM EXTRA STRENGTH)  MG Tab   No No   Sig: Take 1 Tablet by mouth at bedtime as needed (insominia, pain).   docusate sodium (COLACE) 100 MG Cap   No No   Sig: Take 1 Capsule by mouth 2 times a day as needed for Constipation.   finasteride (PROSCAR) 5 MG Tab   No No   Sig: TAKE 1 TABLET BY MOUTH ONCE DAILY   furosemide (LASIX) 40 MG Tab   No No   Sig: Take 1-2 Tablets by mouth every morning. Can take a second tablet in the afternoon for worsening lower extremity swelling   guaifenesin dextromethorphan sugar free (DIABETIC TUSSIN DM)  MG/5ML Liquid soln   No No   Sig: Take 10 mL by mouth every 6 hours as needed for Cough.   hydrOXYzine HCl (ATARAX) 25 MG Tab   No No   Sig: Take 1 Tablet by mouth at bedtime as needed for Itching.   levetiracetam (KEPPRA) 750 MG tablet   No No   Sig: TAKE 2 TABLETS BY MOUTH TWICE DAILY   melatonin 5 mg Tab   No No   Sig: Take 1 Tablet by mouth at bedtime as needed (Insomnia).   melatonin 5 mg Tab   No No   Sig: Take 1 Tablet by mouth at bedtime.   minocycline (MINOCIN) 100 MG Cap   No No   Sig: Take 1 Capsule by mouth 2 times a day.   polyethylene glycol 3350 (MIRALAX) 17 GM/SCOOP Powder   No No   Sig: Take 17 g by mouth 1 time a day as needed (constipation).   potassium chloride ER (KLOR-CON) 10 MEQ tablet   No No   Sig: TAKE 1 TABLET BY MOUTH TWICE DAILY   simvastatin (ZOCOR) 10 MG Tab   No No   Sig: TAKE 1 TABLET BY MOUTH EVERY EVENING   sodium phosphate (READY-TO-USE) 7-19 GM/118ML Enema   No No   Sig: Insert 1 Enema into the rectum as needed (constipation).   spironolactone (ALDACTONE) 25 MG Tab   No No   Sig: Take 1 Tablet by mouth every morning.   tamsulosin (FLOMAX) 0.4 MG capsule   No No   Sig: TAKE 1 CAPSULE BY MOUTH ONCE DAILY   traMADol (ULTRAM) 50 MG Tab   No No   Sig: Take 0.5 Tablets by mouth every 12 hours as needed for Severe Pain or Moderate Pain (take for shoulder pain, take with a tab of tylenol) for up to 90 days.    traZODone (DESYREL) 50 MG Tab   No No   Sig: Take 0.5 Tablets by mouth at bedtime as needed for Sleep.   vitamin A & D (A&D OINTMENT) Ointment   No No   Sig: Apply 1 Application topically every day.   zinc oxide oint (ZINC OXIDE OINTMENT) 20 % Ointment   No No   Sig: Apply 1 Application topically 3 times a day as needed (buttock skin break down).      Facility-Administered Medications: None       Physical Exam  Temp:  [36.8 °C (98.3 °F)] 36.8 °C (98.3 °F)  Pulse:  [] 94  Resp:  [17-19] 17  BP: (118-127)/(58-83) 118/58  SpO2:  [95 %-98 %] 95 %  Blood Pressure : 118/58   Temperature: 36.8 °C (98.3 °F)   Pulse: 94   Respiration: 17   Pulse Oximetry: 95 %       Physical Exam  Constitutional:       Appearance: He is ill-appearing.   HENT:      Head:      Comments: Notable large frontal hematoma     Nose: Nose normal.      Mouth/Throat:      Mouth: Mucous membranes are dry.      Pharynx: Oropharynx is clear.   Eyes:      General: No scleral icterus.     Extraocular Movements: Extraocular movements intact.   Cardiovascular:      Rate and Rhythm: Regular rhythm. Tachycardia present.      Pulses: Normal pulses.      Heart sounds:      No friction rub.   Pulmonary:      Effort: No respiratory distress.      Breath sounds: Rales present. No wheezing.   Chest:      Chest wall: No tenderness.   Abdominal:      General: There is no distension.      Tenderness: There is no abdominal tenderness. There is no guarding or rebound.   Musculoskeletal:      Cervical back: Neck supple. No tenderness.      Right lower leg: No edema.      Left lower leg: No edema.   Skin:     General: Skin is dry.      Capillary Refill: Capillary refill takes less than 2 seconds.      Coloration: Skin is pale.   Neurological:      Mental Status: He is alert and oriented to person, place, and time.   Psychiatric:         Mood and Affect: Mood normal.         Laboratory:  Recent Labs     10/30/23  2028   WBC 8.7   RBC 3.68*   HEMOGLOBIN 11.3*  "  HEMATOCRIT 33.8*   MCV 91.8   MCH 30.7   MCHC 33.4   RDW 50.5*   PLATELETCT 174   MPV 11.5     Recent Labs     10/30/23  2028   SODIUM 137   POTASSIUM 4.4   CHLORIDE 104   CO2 25   GLUCOSE 90   BUN 29*   CREATININE 1.66*   CALCIUM 8.3*     Recent Labs     10/30/23  2028   ALTSGPT 19   ASTSGOT 31   ALKPHOSPHAT 125*   TBILIRUBIN 0.4   GLUCOSE 90         No results for input(s): \"NTPROBNP\" in the last 72 hours.      No results for input(s): \"TROPONINT\" in the last 72 hours.    Imaging:  CT-LSPINE W/O PLUS RECONS   Final Result         1.  Grade 1 spondylolisthesis L4 on L5, likely due to associated severe facet arthrosis.   2.  Scattered sclerotic bony foci are seen, could represent bony island, other sclerotic bony lesions not excluded. Could be further evaluated with bone scan for determination of metabolic activity.   3.  Atherosclerosis      CT-TSPINE W/O PLUS RECONS   Final Result         1.  No acute traumatic bony injury of the thoracic spine.   2.  See dedicated CT lumbar spine for lumbar spine findings.      CT-CHEST,ABDOMEN,PELVIS WITH   Final Result         1.  No significant abnormality in thorax, abdomen and pelvis CT scan.   2.  Atherosclerosis with distal abdominal aortic mural thrombus.   3.  Diverticulosis   4.  Enlarged prostate, workup and evaluation for causes of prostate enlargement recommended as clinically appropriate.   5.  Scattered sclerotic bony foci, could represent bony island, consider other sclerotic bony lesions with additional workup which could include bone scan as clinically appropriate   6.  Fat-containing right inguinal hernia      CT-MAXILLOFACIAL W/O PLUS RECONS   Final Result         1.  Bilateral nasal bone fractures, age indeterminant.      CT-CSPINE WITHOUT PLUS RECONS   Final Result         1.  Multilevel degenerative changes of the cervical spine limit diagnostic sensitivity of this examination   2.  Retrolisthesis C3 on C4 and anterolisthesis C4 on C5, appears most likely " secondary to visualized degenerative changes and facet arthrosis although traumatic listhesis could have radiographically similar appearance, otherwise no acute traumatic bony    injury of the cervical spine is apparent.      CT-HEAD W/O   Final Result         1.  Low-density in the right frontal lobe with layering dependent hyperdensity, compatible with area of encephalomalacia with dependent subarachnoid hemorrhage.   2.  Nonspecific white matter changes, commonly associated with small vessel ischemic disease.  Associated mild cerebral atrophy is noted.   3.  Atherosclerosis.      Based solely on CT findings, the brain injury guideline category is mBIG 2.      Nondisplaced skull fx   SDH 4.1-7.9mm   IPH 4.1-7.9mm   SAH 1 hemisphere, >3 sulci, 1-3mm      The original BIG retrospective analysis found radiographic progression in 0% of BIG 1 patients and 2.6% BIG 2.      These findings were discussed with the patient's clinician, Lyndon Cantor, on 10/30/2023 9:03 PM.          X-Ray:  I have personally reviewed the images and compared with prior images.  EKG:  I have personally reviewed the images and compared with prior images.    Assessment/Plan:  Justification for Admission Status  I anticipate this patient is appropriate for observation status at this time.        * Subarachnoid hemorrhage following injury, no loss of consciousness (HCC)  Assessment & Plan  S/p GLF  On [facility] -- Kcentra given in ED  Neurosurgery and trauma surgery consulted  Neurosurgery recommended transfer to [facility]    Patient however elected for comfort care and hospice arrangement    Comfort care    Stage 3a chronic kidney disease (HCC)- (present on admission)  Assessment & Plan  Comfort care    Seizure (HCC)- (present on admission)  Assessment & Plan  Comfort care    Status post skin flap graft- (present on admission)  Assessment & Plan  Comfort care    Chronic atrial fibrillation (HCC)- (present on admission)  Assessment &  Plan  Comfort care    Essential hypertension- (present on admission)  Assessment & Plan  Comfort care    ACP (advance care planning)  Assessment & Plan  Goal of care discussed with patient in ER.  He stated he does not wish for further treatment of SAH.  He asked for focusing on comfort and arranging hospice.  Diagnosis, prognosis, questions and concerns addressed.  DNR/DNI status confirmed.  ACP: 20 minutes        VTE prophylaxis: None

## 2023-10-31 NOTE — DISCHARGE PLANNING
Called and spoke with Enedelia @ [facility] and they will accept patient. Enedelia will call [facility] for [place] O2 and set up delivery and then contact  to update. Minoo GRAHAM updated. Awaiting call back from [facility].

## 2023-10-31 NOTE — DISCHARGE PLANNING
Received Hospice request. Spoke with patient at bedside to discuss Hospice. Patient wishes CM to contact bill Boone @ [phone] to discuss Hospice. Atif GRAHAM updated and will contact son to update.

## 2023-10-31 NOTE — ASSESSMENT & PLAN NOTE
Goal of care discussed with patient in ER.  He stated he does not wish for further treatment of SAH.  He asked for focusing on comfort and arranging hospice.  Diagnosis, prognosis, questions and concerns addressed.  DNR/DNI status confirmed.  ACP: 20 minutes

## 2023-10-31 NOTE — PROGRESS NOTES
Hospital Medicine Daily Progress Note    Date of Service  10/31/2023    Chief Complaint  Chino Ennis is a 88 y.o. male admitted 10/30/2023 with ground level falls    Hospital Course  Chino Ennis is a 88 y.o. male from [facility] with history of atrial fibrillation on Eliquis, CHF, seizure, multiple other comorbidities, recent craniectomy at [facility], [place] for meningioma who presented 10/30/2023 with evaluation for ground-level fall, TBI.      Patient had extensive trauma survey completed in ER.  He does have notable SAH on CT head.  Neurosurgery recommended patient transfer to [facility], [place].  Kcentra was given as reversal.  However, patient subsequently decided to decline transfer and instead wishes to be comfort care status and pursue hospice. Admitted to medicine service for symptom management pending hospice referral.      Interval Problem Update  10/31: Discussed with patient, who confirmed he wants no transfers, surgery, or aggressive treatments. Discussed hospice referral. He does wish to pursue home hospice but otherwise would like to defer choice to his son Paolo.     Discussed with his son, Paolo. Updated on patient's status and decision to pursue hospice. Paolo did state to me that he felt this decision was reasonable given his chronic and complicated medical issues. He provided hospice choice to case management, prefers an agency that can go to patient's established residence at [facility].     He was accepted to [facility] and we are now pending oxygen delivery and for a time they can do intake - anticipate they will be ready for him to discharge by tomorrow, 11/01/2023.    I have discussed this patient's plan of care and discharge plan at IDT rounds today with Case Management, Nursing, Nursing leadership, and other members of the IDT team.    Consultants/Specialty  trauma surgery    Code Status  Comfort Care/DNR    Disposition  The patient is medically cleared for discharge to home or a post-acute  facility.  Anticipate discharge to: home with organized home healthcare and close outpatient follow-up    I have placed the appropriate orders for post-discharge needs.    Review of Systems  Review of Systems   Constitutional:  Positive for malaise/fatigue. Negative for chills and fever.   HENT:  Negative for hearing loss and tinnitus.    Eyes:  Negative for blurred vision and double vision.   Respiratory:  Negative for cough and shortness of breath.    Cardiovascular:  Negative for chest pain and palpitations.   Gastrointestinal:  Positive for nausea. Negative for heartburn and vomiting.   Genitourinary:  Negative for dysuria and urgency.   Musculoskeletal:  Positive for falls and myalgias.   Skin:  Negative for itching and rash.   Neurological:  Positive for weakness and headaches. Negative for dizziness.   Endo/Heme/Allergies:  Negative for environmental allergies. Bruises/bleeds easily.   Psychiatric/Behavioral:  Negative for depression and substance abuse.    All other systems reviewed and are negative.       Physical Exam  Temp:  [36.8 °C (98.3 °F)-37.1 °C (98.8 °F)] 37.1 °C (98.8 °F)  Pulse:  [] 98  Resp:  [15-19] 16  BP: (110-127)/(56-83) 117/56  SpO2:  [91 %-98 %] 91 %    Physical Exam  Constitutional:       Appearance: He is ill-appearing.   HENT:      Head:      Comments: Notable large frontal hematoma     Nose: Nose normal.      Mouth/Throat:      Mouth: Mucous membranes are dry.      Pharynx: Oropharynx is clear.   Eyes:      General: No scleral icterus.     Conjunctiva/sclera: Conjunctivae normal.   Cardiovascular:      Rate and Rhythm: Normal rate and regular rhythm.      Pulses: Normal pulses.   Pulmonary:      Effort: Pulmonary effort is normal. No respiratory distress.   Abdominal:      General: Bowel sounds are normal.      Palpations: Abdomen is soft.      Tenderness: There is no abdominal tenderness.   Musculoskeletal:      Cervical back: Neck supple. No tenderness.   Skin:     General:  Skin is warm and dry.      Capillary Refill: Capillary refill takes less than 2 seconds.   Neurological:      Mental Status: He is alert and oriented to person, place, and time.   Psychiatric:         Mood and Affect: Mood normal.         Behavior: Behavior normal.         Fluids    Intake/Output Summary (Last 24 hours) at 10/31/2023 1630  Last data filed at 10/31/2023 1235  Gross per 24 hour   Intake 120 ml   Output 725 ml   Net -605 ml       Laboratory  Recent Labs     10/30/23  2028   WBC 8.7   RBC 3.68*   HEMOGLOBIN 11.3*   HEMATOCRIT 33.8*   MCV 91.8   MCH 30.7   MCHC 33.4   RDW 50.5*   PLATELETCT 174   MPV 11.5     Recent Labs     10/30/23  2028   SODIUM 137   POTASSIUM 4.4   CHLORIDE 104   CO2 25   GLUCOSE 90   BUN 29*   CREATININE 1.66*   CALCIUM 8.3*                   Imaging  CT-LSPINE W/O PLUS RECONS   Final Result         1.  Grade 1 spondylolisthesis L4 on L5, likely due to associated severe facet arthrosis.   2.  Scattered sclerotic bony foci are seen, could represent bony island, other sclerotic bony lesions not excluded. Could be further evaluated with bone scan for determination of metabolic activity.   3.  Atherosclerosis      CT-TSPINE W/O PLUS RECONS   Final Result         1.  No acute traumatic bony injury of the thoracic spine.   2.  See dedicated CT lumbar spine for lumbar spine findings.      CT-CHEST,ABDOMEN,PELVIS WITH   Final Result         1.  No significant abnormality in thorax, abdomen and pelvis CT scan.   2.  Atherosclerosis with distal abdominal aortic mural thrombus.   3.  Diverticulosis   4.  Enlarged prostate, workup and evaluation for causes of prostate enlargement recommended as clinically appropriate.   5.  Scattered sclerotic bony foci, could represent bony island, consider other sclerotic bony lesions with additional workup which could include bone scan as clinically appropriate   6.  Fat-containing right inguinal hernia      CT-MAXILLOFACIAL W/O PLUS RECONS   Final Result          1.  Bilateral nasal bone fractures, age indeterminant.      CT-CSPINE WITHOUT PLUS RECONS   Final Result         1.  Multilevel degenerative changes of the cervical spine limit diagnostic sensitivity of this examination   2.  Retrolisthesis C3 on C4 and anterolisthesis C4 on C5, appears most likely secondary to visualized degenerative changes and facet arthrosis although traumatic listhesis could have radiographically similar appearance, otherwise no acute traumatic bony    injury of the cervical spine is apparent.      CT-HEAD W/O   Final Result         1.  Low-density in the right frontal lobe with layering dependent hyperdensity, compatible with area of encephalomalacia with dependent subarachnoid hemorrhage.   2.  Nonspecific white matter changes, commonly associated with small vessel ischemic disease.  Associated mild cerebral atrophy is noted.   3.  Atherosclerosis.      Based solely on CT findings, the brain injury guideline category is mBIG 2.      Nondisplaced skull fx   SDH 4.1-7.9mm   IPH 4.1-7.9mm   SAH 1 hemisphere, >3 sulci, 1-3mm      The original BIG retrospective analysis found radiographic progression in 0% of BIG 1 patients and 2.6% BIG 2.      These findings were discussed with the patient's clinician, Lyndon Cantor, on 10/30/2023 9:03 PM.           Assessment/Plan  * Subarachnoid hemorrhage following injury, no loss of consciousness (HCC)  Assessment & Plan  S/p GLF  On Eliquis -- Kcentra given in ED  Neurosurgery and trauma surgery consulted  Neurosurgery recommended transfer to [facility]    Patient however elected for comfort care and hospice arrangement    Comfort care    ACP (advance care planning)  Assessment & Plan  Goal of care discussed with patient in ER.  He stated he does not wish for further treatment of SAH.  He asked for focusing on comfort and arranging hospice.  Diagnosis, prognosis, questions and concerns addressed.  DNR/DNI status confirmed.  ACP: 20 minutes    Stage  3a chronic kidney disease (HCC)- (present on admission)  Assessment & Plan  Comfort care    Seizure (HCC)- (present on admission)  Assessment & Plan  Comfort care    Status post skin flap graft- (present on admission)  Assessment & Plan  Comfort care    Chronic atrial fibrillation (HCC)- (present on admission)  Assessment & Plan  Comfort care    Essential hypertension- (present on admission)  Assessment & Plan  Comfort care         VTE prophylaxis:   SCDs/TEDs      I have performed a physical exam and reviewed and updated ROS and Plan today (10/31/2023). In review of yesterday's note (10/30/2023), there are no changes except as documented above.

## 2023-10-31 NOTE — ED NOTES
Pt reporting BL 6/10 hip pain and requesting pain medications. ERP aware. ERP to come to bedside.

## 2023-10-31 NOTE — PROGRESS NOTES
Pt arrived to unit via gurney. Pt oriented to room, unit, and plan of care. Patient A&Ox4 on 2L NC with mild WOB. All questions answered at this time. Call light within reach; fall precautions in place.

## 2023-10-31 NOTE — PROGRESS NOTES
4 Eyes Skin Assessment Completed by Kiki RN and OTONIEL Tong.    Head Scab, Swelling, and Redness  Ears WDL  Nose WDL  Mouth WDL  Neck WDL  Breast/Chest WDL  Shoulder Blades WDL  Spine WDL  (R) Arm/Elbow/Hand Bruising and Scab  (L) Arm/Elbow/Hand Bruising and Scab  Abdomen WDL  Groin WDL  Scrotum/Coccyx/Buttocks Redness and Blanching  (R) Leg Redness, Bruising, and Swelling  (L) Leg Redness, Bruising, and Swelling  (R) Heel/Foot/Toe Redness, Blanching, and Boggy  (L) Heel/Foot/Toe Redness, Blanching, and Boggy          Devices In Places Blood Pressure Cuff, Pulse Ox, and Nasal Cannula      Interventions In Place NC W/Ear Foams and Pillows    Possible Skin Injury No    Pictures Uploaded Into Epic Yes  Wound Consult Placed N/A  RN Wound Prevention Protocol Ordered No

## 2023-10-31 NOTE — HOSPITAL COURSE
Chino Ennis is a 88 y.o. male from [facility] with history of atrial fibrillation on Eliquis, CHF, seizure, multiple other comorbidities, recent craniectomy at [facility] for meningioma who presented 10/30/2023 with evaluation for ground-level fall, TBI.      [facility] with reports of multiple falls, one with head strike against a mattress. EMS reported that saturation was found to be 89% on room air and was placed on nasal cannula but was otherwise stable.     Patient had extensive trauma survey completed in ER.  He does have notable SAH on CT head.  Neurosurgery recommended patient transfer to [facility].  Kcentra was given as reversal.  However, patient subsequently decided to decline transfer, surgery, or aggressive treatment and instead wished to be comfort care status and pursue hospice. He was admitted to medicine service for symptom management with a hospice referral pending.    No acute events overnight. On 10/31 patient awake, and oriented.  Had extensive discussion regarding his wishes and he again confirmed that he wanted comfort care at this time.  He was accepted to hospice with plans for discharge home the next day.  Per his request, discussed events with his son Paolo who also stated that he felt this decision was reasonable given his chronic and complicated medical issues.     On 11/1, patient became progressively less interactive and more hypoxic, with no visible increased work of breathing or distress. Family updated. Comfort care orders in place, resting peacefully. In the afternoon, Mr Ennis was noted to be no longer breathing and pulseless. He was pronounced dead by myself at 1345 on 11/01/2023. Son at bedside shortly afterwards.

## 2023-10-31 NOTE — CONSULTS
Palliative Care follow-up  Consult received and EMR reviewed. Goals of care established and patient current CC with goal to d/c with hospice. Hospice referral also in place. Recommend hospice choice is obtained by case management to facilitate this, order is in place. Phone call to RN GIANNI, but no answer. Please call [phone] with any questions.    Plan: Consult deferred as goals are established.     Thank you for allowing Palliative Care to support this patient and family. Contact [phone] for additional assistance, change in patient status, or with any questions/concerns.      SANTOS Soto  Palliative Care Nurse Practitioner   [phone]

## 2023-10-31 NOTE — ED NOTES
Pt upgraded to trauma green after CT-head results. ERP to bedside to discuss POC. Trauma surgeon paged for update.

## 2023-10-31 NOTE — PROGRESS NOTES
On pain reassessment following pain medication administration, see MAR, patient reports  3/10 pain. Patient refused PRN pain medication for 3/10 pain. This RN will continue to reassess and monitor.

## 2023-10-31 NOTE — ED PROVIDER NOTES
ED Provider Note    CHIEF COMPLAINT  Chief Complaint   Patient presents with    T-5000 GLF     Br[facility]. Per staff patient had a multiple fall today ~ 6-7 times. Last fall patient hit head on a mattress. On eliquis. Denies loc. C/o left lower leg pain. + edema. GCS 15.        EXTERNAL RECORDS REVIEWED  Inpatient Notes inpatient note from 9/28 for patient was treated for frontal scalp wound and bacteremia.    HPI/ROS  LIMITATION TO HISTORY     OUTSIDE HISTORIAN(S):  EMS verbal report that they were called to the patient's assisted living for fall with head strike against mattress EMS report patient was mildly hypoxemic in the field at 89% placed on nasal cannula otherwise stable    Chino Ennis is a 88 y.o. male who presents after fall.  Patient says that he tripped and fell, denies preceding headache, chest pain, shortness of breath or dizziness.  Patient denies recent illness.  Patient denies complaints of pain.  Patient denies recent vomiting, diarrhea, urinary changes.    PAST MEDICAL HISTORY   has a past medical history of Allergy, Anxiety, Arrhythmia, Arthritis, ASTHMA, Bladder calculi (3/7/2016), Blood clotting disorder (HCC) (2015, 1/2016), Bowel habit changes, CATARACT, Chronic gingivitis (2/17/2016), Closed head injury (9/28/2022), Cold (11/2019), Enlarged prostate, Headache(784.0), Heart burn, Heart murmur, History of DVT of lower extremity (12/23/2015), History of resection of meningioma (4/17/2013), History of transurethral resection of prostate (2/17/2016), History of transurethral resection of prostate (2/17/2016), Hyperlipidemia, IBD (inflammatory bowel disease), Indigestion, Insomnia (2/17/2016), OSTEOPOROSIS, 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    SURGICAL HISTORY   has a past surgical history that includes trans urethral resection prostate; tonsillectomy; dental extraction(s); arthroscopy, knee; craniotomy brain lab (); vasectomy; prostatectomy, radical retro; eye surgery; craniotomy; cystoscopy (N/A, 3/7/2016); lasertripsy (N/A, 3/7/2016); trans urethral resection prostate (N/A, 3/7/2016); knee arthroscopy (Left, ); cataract extraction with iol (); and hardware removal neuro (2019).    FAMILY HISTORY  Family History   Problem Relation Age of Onset    Cancer Father         Lung    Heart Disease Father     Hypertension Father     Hyperlipidemia Father     Heart Disease Maternal Uncle     Hypertension Maternal Uncle     Hyperlipidemia Maternal Uncle     Stroke Maternal Uncle     Cancer Paternal Uncle         Leukemia       SOCIAL HISTORY  Social History     Tobacco Use    Smoking status: Former     Current packs/day: 0.00     Average packs/day: 1.5 packs/day for 30.0 years (45.0 ttl pk-yrs)     Types: Cigarettes     Start date: 1968     Quit date: 1998     Years since quittin.8    Smokeless tobacco: Never    Tobacco comments:     avoid all tobacco products   Vaping Use    Vaping Use: Never used   Substance and Sexual Activity    Alcohol use: Yes     Alcohol/week: 1.0 oz     Types: 2 Shots of liquor per week    Drug use: No    Sexual activity: Never       CURRENT MEDICATIONS  Home Medications       Reviewed by Charli Herbert R.N. (Registered Nurse) on 10/30/23 at 2018  Med List Status: Partial     Medication Last Dose Status   acetaminophen (TYLENOL) 500 MG Tab  Active   albuterol 108 (90 Base) MCG/ACT Aero Soln inhalation aerosol  Active   apixaban (ELIQUIS) 2.5mg Tab  Active   bacitracin 500 UNIT/GM Ointment  Active   Balsam [place]-Castor Oil (DERMULCERA) Ointment  Active   bisacodyl (DULCOLAX) 10 MG Suppos  Active   clotrimazole-betamethasone  "(LOTRISONE) 1-0.05 % Cream  Active   collagenase (SANTYL) ointment  Active   diphenhydrAMINE-APAP, sleep, (TYLENOL PM EXTRA STRENGTH)  MG Tab  Active   docusate sodium (COLACE) 100 MG Cap  Active   Elastic Bandages & Supports (MEDICAL COMPRESSION STOCKINGS) Misc  Active   finasteride (PROSCAR) 5 MG Tab  Active   furosemide (LASIX) 40 MG Tab  Active   guaifenesin dextromethorphan sugar free (DIABETIC TUSSIN DM)  MG/5ML Liquid soln  Active   hydrOXYzine HCl (ATARAX) 25 MG Tab  Active   levetiracetam (KEPPRA) 750 MG tablet  Active   Magnesium Citrate 200 MG Tab  Active   melatonin 5 mg Tab  Active   melatonin 5 mg Tab  Active   minocycline (MINOCIN) 100 MG Cap  Active   NON SPECIFIED  Active   polyethylene glycol 3350 (MIRALAX) 17 GM/SCOOP Powder  Active   potassium chloride ER (KLOR-CON) 10 MEQ tablet  Active   simvastatin (ZOCOR) 10 MG Tab  Active   sodium phosphate (READY-TO-USE) 7-19 GM/118ML Enema  Active   spironolactone (ALDACTONE) 25 MG Tab  Active   tamsulosin (FLOMAX) 0.4 MG capsule  Active   traMADol (ULTRAM) 50 MG Tab  Active   traZODone (DESYREL) 50 MG Tab  Active   vitamin A & D (A&D OINTMENT) Ointment  Active   zinc oxide oint (ZINC OXIDE OINTMENT) 20 % Ointment  Active                    ALLERGIES  Allergies   Allergen Reactions    Other Environmental Unspecified     Grasses and weeds=asthma     Shellfish Allergy Hives    Strawberry Hives    Fish Unspecified     Bones get stuck in teeth    Lactose Diarrhea     indigestion       PHYSICAL EXAM  VITAL SIGNS: /58   Pulse 94   Temp 36.8 °C (98.3 °F) (Temporal)   Resp 17   Ht 1.549 m (5' 1\")   Wt 72.6 kg (160 lb)   SpO2 95%   BMI 30.23 kg/m²    Constitutional: Chronically ill-appearing  HENT: Significant swelling and fluctuance across forehead with small amount of purulent discharge bilateral external ears normal, Nose normal.   Eyes: Pupils are equal and reactive, Conjunctiva normal, Non-icteric.   Neck: Normal range of motion, No " "tenderness, Supple, No stridor.   Lymphatic: No lymphadenopathy noted.   Cardiovascular: Regular rate and rhythm, no murmurs.   Thorax & Lungs: Normal breath sounds, No respiratory distress, No wheezing, No chest tenderness.   Abdomen: Bowel sounds normal, Soft, No tenderness, No masses, No pulsatile masses.   Skin: Ecchymosis across bilateral upper extremities  Back: Scattered abrasions and ecchymosis across upper thorax and lower back  Extremities: Intact distal pulses, bilateral lower extremity edema no tenderness, No cyanosis  Musculoskeletal: Good range of motion in all major joints. No tenderness to palpation or major deformities noted.   Neurologic: Alert , Normal motor function, Normal sensory function, No focal deficits noted.   Psychiatric: Affect normal, Judgment normal, Mood normal.    DIAGNOSTIC STUDIES / PROCEDURES  EKG      LABS  Labs Reviewed   CBC WITH DIFFERENTIAL - Abnormal; Notable for the following components:       Result Value    RBC 3.68 (*)     Hemoglobin 11.3 (*)     Hematocrit 33.8 (*)     RDW 50.5 (*)     Neutrophils-Polys 78.00 (*)     Lymphocytes 5.90 (*)     Monocytes 15.30 (*)     Lymphs (Absolute) 0.51 (*)     Monos (Absolute) 1.33 (*)     All other components within normal limits   COMP METABOLIC PANEL - Abnormal; Notable for the following components:    Bun 29 (*)     Creatinine 1.66 (*)     Calcium 8.3 (*)     Alkaline Phosphatase 125 (*)     Albumin 2.5 (*)     Total Protein 5.5 (*)     All other components within normal limits   ESTIMATED GFR - Abnormal; Notable for the following components:    GFR (CKD-EPI) 40 (*)     All other components within normal limits   LACTIC ACID   BLOOD CULTURE    Narrative:     1 of 2 for Blood Culture x 2 sites order. Per Hospital  Policy: Only change Specimen Src: to \"Line\" if specified by  physician order.  Release to patient->Immediate   BLOOD CULTURE   LACTIC ACID   LACTIC ACID         RADIOLOGY  I have independently interpreted the diagnostic " imaging associated with this visit and am waiting the final reading from the radiologist.   My preliminary interpretation is as follows: No pneumothorax  Radiologist interpretation:   CT-LSPINE W/O PLUS RECONS   Final Result         1.  Grade 1 spondylolisthesis L4 on L5, likely due to associated severe facet arthrosis.   2.  Scattered sclerotic bony foci are seen, could represent bony island, other sclerotic bony lesions not excluded. Could be further evaluated with bone scan for determination of metabolic activity.   3.  Atherosclerosis      CT-TSPINE W/O PLUS RECONS   Final Result         1.  No acute traumatic bony injury of the thoracic spine.   2.  See dedicated CT lumbar spine for lumbar spine findings.      CT-CHEST,ABDOMEN,PELVIS WITH   Final Result         1.  No significant abnormality in thorax, abdomen and pelvis CT scan.   2.  Atherosclerosis with distal abdominal aortic mural thrombus.   3.  Diverticulosis   4.  Enlarged prostate, workup and evaluation for causes of prostate enlargement recommended as clinically appropriate.   5.  Scattered sclerotic bony foci, could represent bony island, consider other sclerotic bony lesions with additional workup which could include bone scan as clinically appropriate   6.  Fat-containing right inguinal hernia      CT-MAXILLOFACIAL W/O PLUS RECONS   Final Result         1.  Bilateral nasal bone fractures, age indeterminant.      CT-CSPINE WITHOUT PLUS RECONS   Final Result         1.  Multilevel degenerative changes of the cervical spine limit diagnostic sensitivity of this examination   2.  Retrolisthesis C3 on C4 and anterolisthesis C4 on C5, appears most likely secondary to visualized degenerative changes and facet arthrosis although traumatic listhesis could have radiographically similar appearance, otherwise no acute traumatic bony    injury of the cervical spine is apparent.      CT-HEAD W/O   Final Result         1.  Low-density in the right frontal lobe  with layering dependent hyperdensity, compatible with area of encephalomalacia with dependent subarachnoid hemorrhage.   2.  Nonspecific white matter changes, commonly associated with small vessel ischemic disease.  Associated mild cerebral atrophy is noted.   3.  Atherosclerosis.      Based solely on CT findings, the brain injury guideline category is mBIG 2.      Nondisplaced skull fx   SDH 4.1-7.9mm   IPH 4.1-7.9mm   SAH 1 hemisphere, >3 sulci, 1-3mm      The original BIG retrospective analysis found radiographic progression in 0% of BIG 1 patients and 2.6% BIG 2.      These findings were discussed with the patient's clinician, Lyndon Cantor, on 10/30/2023 9:03 PM.            COURSE & MEDICAL DECISION MAKING    ED Observation Status?     INITIAL ASSESSMENT, COURSE AND PLAN  Care Narrative: Patient was seen in triage as TBI alert.  Given patient's age, anticoagulated status, extensive abrasions bruising across thorax and lower back plan for CT head, face, cervical spine, chest and pelvis.  Patient does have ongoing chronic wound of forehead.  Given patient's previous wound infection and bacteremia will obtain blood cultures.    CT imaging notable for subarachnoid hemorrhage and encephalomalacia around frontal scalp.  Based on patient's anticoagulated status is big 3.  Spoke with Dr. Murrieta from neurosurgery who recommended prior to transfer to [facility] for operative management as patient has had previous management done there.  Discussed with patient CT findings and likely plan for operative management.  Patient says that he does not want surgery under any circumstances and just wants to be able to be comfortable.  Patient confirms DNR/DNI status.  Spoke with trauma surgery regarding patient's injuries and updated them of patient's decision to pursue comfort care and hospice.  Spoke with social work regarding coordination of hospice.  Spoke with pharmacy regarding reversal of Xarelto.  Placed comfort care  measures.  Spoke with hospitalist regarding admission for comfort care and hospice placement.    CRITICAL CARE  The very real possibilty of a deterioration of this patient's condition required the highest level of my preparedness for sudden, emergent intervention.  I provided critical care services, which included medication orders, frequent reevaluations of the patient's condition and response to treatment, ordering and reviewing test results, and discussing the case with various consultants.  The critical care time associated with the care of the patient was 60 minutes. Review chart for interventions. This time is exclusive of any other billable procedures.           ADDITIONAL PROBLEM LIST    DISPOSITION AND DISCUSSIONS  I have discussed management of the patient with the following physicians and SUMANTH's: Neurosurgery, trauma surgery, hospitalist    Discussion of management with other QHP or appropriate source(s): Pharmacy regarding anticoagulation reversal and Social Work regarding hospice coordination      FINAL DIAGNOSIS  1. Subarachnoid hemorrhage (HCC)           Electronically signed by: Lyndon Cantor D.O., 10/30/2023 7:57 PM

## 2023-10-31 NOTE — DISCHARGE PLANNING
Received call from Enedelia from [facility] and O2 ordered and anticipate O2 Delivery between 3-4. Anticipate D/C to Hospice 11/01/2023. Atif GRAHAM updated.

## 2023-10-31 NOTE — CARE PLAN
Problem: Pain - Standard  Goal: Alleviation of pain or a reduction in pain to the patient’s comfort goal  Outcome: Progressing     Problem: Knowledge Deficit - Standard  Goal: Patient and family/care givers will demonstrate understanding of plan of care, disease process/condition, diagnostic tests and medications  Outcome: Progressing     Problem: Skin Integrity  Goal: Skin integrity is maintained or improved  Outcome: Progressing     Problem: Fall Risk  Goal: Patient will remain free from falls  Outcome: Progressing   The patient is Stable - Low risk of patient condition declining or worsening    Shift Goals  Clinical Goals: pain control, O2, monitor urine output  Patient Goals: rest    Progress made toward(s) clinical / shift goals:    Patient's pain is well controlled and is less than 3/10.    Patient is not progressing towards the following goals: N/A

## 2023-10-31 NOTE — DISCHARGE PLANNING
Called and spoke with patient son per patient request. Spoke with Paolo and he has been updated by Atif GRAHAM. Discussed Hospice and son agreeable and choice filled out via telephone consent. Choice form faxed to Aime HART. Message sent to Aime HART to update.

## 2023-11-01 NOTE — PROGRESS NOTES
Patient is trying to get out of bed and says he has to void, but is unable to. I tried to place a rebolledo and he refused, even after extensive education about the necessity of emptying the bladder. He agreed to a dose of morphine and went back to sleep.

## 2023-11-01 NOTE — DISCHARGE SUMMARY
Death Summary    Cause of Death  Acute hypoxic respiratory failure due to subarachnoid hemorrhage due to ground level fall in setting of chronic anticoagulated status on apixaban.    Comorbid Conditions at the Time of Death  Principal Problem:    Subarachnoid hemorrhage following injury, no loss of consciousness (HCC) (POA: Unknown)  Active Problems:    Essential hypertension (Chronic) (POA: Yes)    Chronic atrial fibrillation (HCC) (Chronic) (POA: Yes)    Status post skin flap graft (Chronic) (POA: Yes)    Seizure (HCC) (Chronic) (POA: Yes)    Stage 3a chronic kidney disease (HCC) (POA: Yes)    SAH (subarachnoid hemorrhage) (HCC) (POA: Yes)    ACP (advance care planning) (POA: Unknown)  Resolved Problems:    * No resolved hospital problems. *      History of Presenting Illness and Hospital Course  Chino Ennis is a 88 y.o. male from [facility] with history of atrial fibrillation on Eliquis, CHF, seizure, multiple other comorbidities, recent craniectomy at [facility] for meningioma who presented 10/30/2023 with evaluation for ground-level fall, TBI.      [facility] with reports of multiple falls, one with head strike against a mattress. EMS reported that saturation was found to be 89% on room air and was placed on nasal cannula but was otherwise stable.     Patient had extensive trauma survey completed in ER.  He does have notable SAH on CT head.  Neurosurgery recommended patient transfer to [facility].  Kcentra was given as reversal.  However, patient subsequently decided to decline transfer, surgery, or aggressive treatment and instead wished to be comfort care status and pursue hospice. He was admitted to medicine service for symptom management with a hospice referral pending.    No acute events overnight. On 10/31 patient awake, and oriented.  Had extensive discussion regarding his wishes and he again confirmed that he wanted comfort care at this time.  He was accepted to hospice  with plans for discharge home the next day.  Per his request, discussed events with his son Paolo who also stated that he felt this decision was reasonable given his chronic and complicated medical issues.     On 11/1, patient became progressively less interactive and more hypoxic, with no visible increased work of breathing or distress. Family updated. Comfort care orders in place, resting peacefully. In the afternoon, Mr Ennis was noted to be no longer breathing and pulseless. He was pronounced dead by myself at 1345 on 11/01/2023. Son at bedside shortly afterwards.      TOD: 11/01/2023 at 1345  Pronounced by: SANTOS Weiner

## 2023-11-01 NOTE — DISCHARGE PLANNING
0904  Agency/Facility Name: [facility]  Outcome: HAILEY received voicemail from Enedelia regarding questions of Pt D/C.    0905  Agency/Facility Name: [facility]  Spoke To: Enedelia  Outcome: DPA informed Enedelia that [facility]PA was not able to answer ATT but HAILEY was able to transfer Enedelia to  for Pt.

## 2023-11-01 NOTE — PROGRESS NOTES
Pt stopped breathing and no heart beat,2 RN declared death at 1345 pm,informed Paolo son,Raymond son and family viewed,explained to Raymond the procedure that body will go to [facility],Raymond son is going to call the  place and give them the information.

## 2023-11-01 NOTE — PROGRESS NOTES
Loli in bed,oxygen saturation dropping,having cheyne stroke breathing,suction done,updated status to son,informed MD,pt looks comfortable,no signs of pain.

## 2023-11-01 NOTE — CARE PLAN
Problem: Pain - Standard  Goal: Alleviation of pain or a reduction in pain to the patient’s comfort goal  Outcome: Progressing     Problem: Knowledge Deficit - Standard  Goal: Patient and family/care givers will demonstrate understanding of plan of care, disease process/condition, diagnostic tests and medications  Outcome: Progressing     Problem: Skin Integrity  Goal: Skin integrity is maintained or improved  Outcome: Progressing     Problem: Fall Risk  Goal: Patient will remain free from falls  Outcome: Progressing     The patient is Stable - Low risk of patient condition declining or worsening    Shift Goals  Clinical Goals: Comfort care  Patient Goals: rest and pain control    Progress made toward(s) clinical / shift goals:  yes    Patient is not progressing towards the following goals:Patient is still retaining urine, is unable to urinate at all. He was straight cathed during the day and an order was placed for an indwelling catheter, but he refused, despite considerable educating.

## 2023-11-04 LAB
BACTERIA BLD CULT: NORMAL
SIGNIFICANT IND 70042: NORMAL
SITE SITE: NORMAL
SOURCE SOURCE: NORMAL
